# Patient Record
Sex: MALE | Race: WHITE | NOT HISPANIC OR LATINO | Employment: UNEMPLOYED | URBAN - METROPOLITAN AREA
[De-identification: names, ages, dates, MRNs, and addresses within clinical notes are randomized per-mention and may not be internally consistent; named-entity substitution may affect disease eponyms.]

---

## 2019-04-09 ENCOUNTER — APPOINTMENT (EMERGENCY)
Dept: RADIOLOGY | Facility: HOSPITAL | Age: 34
End: 2019-04-09
Payer: MEDICAID

## 2019-04-09 ENCOUNTER — HOSPITAL ENCOUNTER (EMERGENCY)
Facility: HOSPITAL | Age: 34
Discharge: HOME | End: 2019-04-09
Attending: EMERGENCY MEDICINE
Payer: MEDICAID

## 2019-04-09 VITALS
DIASTOLIC BLOOD PRESSURE: 101 MMHG | HEART RATE: 112 BPM | RESPIRATION RATE: 22 BRPM | OXYGEN SATURATION: 98 % | SYSTOLIC BLOOD PRESSURE: 156 MMHG | HEIGHT: 71 IN | WEIGHT: 190 LBS | BODY MASS INDEX: 26.6 KG/M2 | TEMPERATURE: 98.8 F

## 2019-04-09 DIAGNOSIS — L03.115 CELLULITIS OF RIGHT LOWER EXTREMITY: Primary | ICD-10-CM

## 2019-04-09 DIAGNOSIS — M79.604 PAIN OF RIGHT LOWER EXTREMITY: ICD-10-CM

## 2019-04-09 PROCEDURE — 99283 EMERGENCY DEPT VISIT LOW MDM: CPT

## 2019-04-09 PROCEDURE — 73590 X-RAY EXAM OF LOWER LEG: CPT

## 2019-04-09 RX ORDER — TRAMADOL HYDROCHLORIDE 50 MG/1
50 TABLET ORAL ONCE
Status: COMPLETED | OUTPATIENT
Start: 2019-04-09 | End: 2019-04-09

## 2019-04-09 RX ORDER — TRAMADOL HYDROCHLORIDE 50 MG/1
50 TABLET ORAL EVERY 6 HOURS PRN
Qty: 30 TABLET | Refills: 0 | Status: SHIPPED | OUTPATIENT
Start: 2019-04-09 | End: 2019-04-19

## 2019-04-09 RX ORDER — AMOXICILLIN AND CLAVULANATE POTASSIUM 875; 125 MG/1; MG/1
1 TABLET, FILM COATED ORAL ONCE
Status: COMPLETED | OUTPATIENT
Start: 2019-04-09 | End: 2019-04-09

## 2019-04-09 RX ORDER — AMOXICILLIN AND CLAVULANATE POTASSIUM 875; 125 MG/1; MG/1
1 TABLET, FILM COATED ORAL EVERY 12 HOURS SCHEDULED
Qty: 14 TABLET | Refills: 0 | Status: SHIPPED | OUTPATIENT
Start: 2019-04-09 | End: 2019-04-16

## 2019-04-09 RX ADMIN — TRAMADOL HYDROCHLORIDE 50 MG: 50 TABLET, COATED ORAL at 23:44

## 2019-04-09 RX ADMIN — AMOXICILLIN AND CLAVULANATE POTASSIUM 1 TABLET: 875; 125 TABLET, FILM COATED ORAL at 23:44

## 2019-06-03 ENCOUNTER — HOSPITAL ENCOUNTER (EMERGENCY)
Facility: HOSPITAL | Age: 34
Discharge: HOME | End: 2019-06-03
Attending: EMERGENCY MEDICINE | Admitting: EMERGENCY MEDICINE
Payer: COMMERCIAL

## 2019-06-03 VITALS
HEIGHT: 71 IN | DIASTOLIC BLOOD PRESSURE: 86 MMHG | BODY MASS INDEX: 26.7 KG/M2 | WEIGHT: 190.7 LBS | RESPIRATION RATE: 18 BRPM | OXYGEN SATURATION: 98 % | HEART RATE: 79 BPM | SYSTOLIC BLOOD PRESSURE: 155 MMHG | TEMPERATURE: 96.1 F

## 2019-06-03 DIAGNOSIS — S16.1XXA CERVICAL STRAIN: Primary | ICD-10-CM

## 2019-06-03 DIAGNOSIS — M54.2 NECK PAIN: ICD-10-CM

## 2019-06-03 PROCEDURE — 99283 EMERGENCY DEPT VISIT LOW MDM: CPT

## 2019-06-03 RX ORDER — NAPROXEN 250 MG/1
250 TABLET ORAL 2 TIMES DAILY WITH MEALS
Qty: 20 TABLET | Refills: 0 | Status: SHIPPED | OUTPATIENT
Start: 2019-06-03 | End: 2020-09-20 | Stop reason: ALTCHOICE

## 2019-06-03 RX ORDER — CYCLOBENZAPRINE HCL 10 MG
10 TABLET ORAL 2 TIMES DAILY PRN
Qty: 20 TABLET | Refills: 0 | Status: SHIPPED | OUTPATIENT
Start: 2019-06-03 | End: 2020-09-20 | Stop reason: ALTCHOICE

## 2020-09-20 ENCOUNTER — HOSPITAL ENCOUNTER (EMERGENCY)
Facility: HOSPITAL | Age: 35
Discharge: HOME | End: 2020-09-20
Attending: EMERGENCY MEDICINE | Admitting: EMERGENCY MEDICINE
Payer: COMMERCIAL

## 2020-09-20 ENCOUNTER — APPOINTMENT (EMERGENCY)
Dept: CT IMAGING | Facility: HOSPITAL | Age: 35
End: 2020-09-20
Payer: COMMERCIAL

## 2020-09-20 VITALS
TEMPERATURE: 98.2 F | SYSTOLIC BLOOD PRESSURE: 150 MMHG | RESPIRATION RATE: 17 BRPM | DIASTOLIC BLOOD PRESSURE: 89 MMHG | OXYGEN SATURATION: 98 % | HEART RATE: 88 BPM

## 2020-09-20 DIAGNOSIS — K04.7 DENTAL ABSCESS: Primary | ICD-10-CM

## 2020-09-20 DIAGNOSIS — K02.9 DENTAL CARIES: ICD-10-CM

## 2020-09-20 DIAGNOSIS — R22.0 FACIAL SWELLING: ICD-10-CM

## 2020-09-20 PROCEDURE — 99284 EMERGENCY DEPT VISIT MOD MDM: CPT | Performed by: EMERGENCY MEDICINE

## 2020-09-20 PROCEDURE — 96374 THER/PROPH/DIAG INJ IV PUSH: CPT

## 2020-09-20 PROCEDURE — G1004 CDSM NDSC: HCPCS

## 2020-09-20 PROCEDURE — 99284 EMERGENCY DEPT VISIT MOD MDM: CPT

## 2020-09-20 PROCEDURE — 70487 CT MAXILLOFACIAL W/DYE: CPT

## 2020-09-20 RX ORDER — LISINOPRIL 20 MG/1
20 TABLET ORAL DAILY
COMMUNITY

## 2020-09-20 RX ORDER — AMOXICILLIN AND CLAVULANATE POTASSIUM 875; 125 MG/1; MG/1
1 TABLET, FILM COATED ORAL EVERY 12 HOURS
Qty: 14 TABLET | Refills: 0 | Status: SHIPPED | OUTPATIENT
Start: 2020-09-20 | End: 2020-09-27

## 2020-09-20 RX ORDER — CHLORHEXIDINE GLUCONATE 0.12 MG/ML
15 RINSE ORAL 2 TIMES DAILY
Qty: 120 ML | Refills: 0 | Status: SHIPPED | OUTPATIENT
Start: 2020-09-20

## 2020-09-20 RX ORDER — AMOXICILLIN AND CLAVULANATE POTASSIUM 875; 125 MG/1; MG/1
1 TABLET, FILM COATED ORAL ONCE
Status: COMPLETED | OUTPATIENT
Start: 2020-09-20 | End: 2020-09-20

## 2020-09-20 RX ORDER — KETOROLAC TROMETHAMINE 30 MG/ML
15 INJECTION, SOLUTION INTRAMUSCULAR; INTRAVENOUS ONCE
Status: COMPLETED | OUTPATIENT
Start: 2020-09-20 | End: 2020-09-20

## 2020-09-20 RX ADMIN — IOHEXOL 85 ML: 350 INJECTION, SOLUTION INTRAVENOUS at 17:13

## 2020-09-20 RX ADMIN — KETOROLAC TROMETHAMINE 15 MG: 30 INJECTION, SOLUTION INTRAMUSCULAR at 16:46

## 2020-09-20 RX ADMIN — AMOXICILLIN AND CLAVULANATE POTASSIUM 1 TABLET: 875; 125 TABLET, FILM COATED ORAL at 18:47

## 2020-09-20 RX ADMIN — DEXAMETHASONE SODIUM PHOSPHATE 10 MG: 10 INJECTION, SOLUTION INTRAMUSCULAR; INTRAVENOUS at 16:45

## 2020-09-20 NOTE — DISCHARGE INSTRUCTIONS
As we discussed, you have 2 small dental abscesses  I spoke with the on-call [facility] specialist who stated that you should follow-up with their office tomorrow  Call tomorrow and make an appointment for as soon as possible  I have prescribed Augmentin to treat the infection  Take this for 7 days as prescribed  I have also prescribed Peridex mouthwash, which is an antiseptic mouthwash  Use this twice daily as prescribed  You should call [facility] tomorrow and make a follow-up appointment for as soon as possible  Return to the ER with fever, chills, worsening swelling, difficulty opening your mouth, voice changes such as a hoarse voice or difficulty speaking, difficulty swallowing, and difficulty breathing

## 2020-09-20 NOTE — Clinical Note
Deana Ramos was seen and treated in our emergency department on 9/20/2020  No limitations  Diagnosis: Multiple right-sided dental abscesses  Cabrera Sa    He may return on this date: 09/22/2020    Rick Mathur should follow-up with [facility] as soon as possible for treatment of his dental problems  Please excuse him from work if he has an appointment with [facility] or a Dentist      If you have any questions or concerns, please don't hesitate to call        Irena Toney MD    ______________________________           _______________          _______________  Hospital Representative                              Date                                Time

## 2020-09-20 NOTE — ED PROVIDER NOTES
History  Chief Complaint   Patient presents with    Dental Pain     pt presents to ed for right lower dental pain that started last night, no other complaints at this time         40-year-old male with past medical history of hypertension who is presenting with facial swelling  The swelling is located in the right upper cheek  Patient states that this began yesterday  He has a history of poor dentition with numerous dental caries and missing teeth  He states that he last saw a dentist about 1 year ago  He denies any acute injuries to his face  No fevers or chills  No difficulty opening the mouth, speaking, or swallowing  No neck pain or neck stiffness  No other complaints on review of systems  Prior to Admission Medications   Prescriptions Last Dose Informant Patient Reported? Taking?   lisinopril (ZESTRIL) 20 mg tablet   Yes Yes   Sig: Take 20 mg by mouth daily      Facility-Administered Medications: None       Past Medical History:   Diagnosis Date    Hypertension        Past Surgical History:   Procedure Laterality Date    SHOULDER SURGERY Left        Family History   Problem Relation Age of Onset    Hypertension Mother     Hypertension Father      I have reviewed and agree with the history as documented  E-Cigarette/Vaping     E-Cigarette/Vaping Substances     Social History     Tobacco Use    Smoking status: Current Every Day Smoker     Packs/day: 0 50     Types: Cigarettes    Smokeless tobacco: Never Used   Substance Use Topics    Alcohol use: Yes     Comment: occ   Drug use: Not Currently       Review of Systems   Constitutional: Negative for diaphoresis, fever and unexpected weight change  HENT: Positive for dental problem and facial swelling  Negative for congestion, rhinorrhea and sore throat  Eyes: Negative for pain, discharge and visual disturbance  Respiratory: Negative for cough, shortness of breath and wheezing      Cardiovascular: Negative for chest pain, palpitations and leg swelling  Gastrointestinal: Negative for abdominal pain, blood in stool, constipation, diarrhea, nausea and vomiting  Genitourinary: Negative for dysuria, flank pain and hematuria  Musculoskeletal: Negative for arthralgias and myalgias  Skin: Negative for rash and wound  Allergic/Immunologic: Negative for environmental allergies and food allergies  Neurological: Negative for dizziness, seizures, weakness and numbness  Hematological: Negative for adenopathy  Psychiatric/Behavioral: Negative for confusion and hallucinations  Physical Exam  Physical Exam  Vitals signs and nursing note reviewed  Constitutional:       Appearance: He is well-developed  He is not diaphoretic  HENT:      Head: Normocephalic and atraumatic  Comments: Significant facial swelling affecting right upper cheek  See below photograph  Very poor dentition, with numerous dental caries and missing teeth  No periapical abscess seen  Gingivitis seen  No trismus  No submandibular or submental swelling or induration  Right Ear: External ear normal       Left Ear: External ear normal       Nose: Nose normal    Eyes:      Pupils: Pupils are equal, round, and reactive to light  Neck:      Comments: No rigidity  Normal range of motion  Cardiovascular:      Rate and Rhythm: Normal rate and regular rhythm  Pulmonary:      Effort: Pulmonary effort is normal  No respiratory distress  Musculoskeletal: Normal range of motion  General: No deformity  Skin:     General: Skin is warm and dry  Capillary Refill: Capillary refill takes less than 2 seconds  Neurological:      Mental Status: He is alert and oriented to person, place, and time  Comments: No gross motor deficits noted  Cranial nerves II-XII are intact  Speech is fluent without dysarthria or aphasia     Psychiatric:         Mood and Affect: Mood normal          Behavior: Behavior normal                Vital Signs  ED Triage Vitals [09/20/20 1610]   Temperature Pulse Respirations Blood Pressure SpO2   98 2 °F (36 8 °C) 92 17 (!) 155/109 99 %      Temp Source Heart Rate Source Patient Position - Orthostatic VS BP Location FiO2 (%)   Tympanic Monitor -- Right arm --      Pain Score       No Pain           Vitals:    09/20/20 1610 09/20/20 1710   BP: (!) 155/109 149/93   Pulse: 92 89         Visual Acuity      ED Medications  Medications   amoxicillin-clavulanate (AUGMENTIN) 875-125 mg per tablet 1 tablet (has no administration in time range)   ketorolac (TORADOL) injection 15 mg (15 mg Intravenous Given 9/20/20 1646)   dexamethasone 10 mg/mL oral liquid 10 mg 1 mL (10 mg Oral Given 9/20/20 1645)   iohexol (OMNIPAQUE) 350 MG/ML injection (MULTI-DOSE) 85 mL (85 mL Intravenous Given 9/20/20 1713)       Diagnostic Studies  Results Reviewed     None                 CT facial bones with contrast   Final Result by Sarita Andrew MD (09/20 1758)         1  Extensive right facial cellulitis  Subperiosteal abscess overlying the right maxilla measuring 1 8 x 0 6 x 1 8 cm  Right mandibular body subperiosteal 1 4 x 0 6 x 1 2 cm abscess  2   Periapical abscesses involving the right maxillary incisor and right mandibular 1st premolar, both with buccal cortical destruction suggesting odontogenic sources of infection  3   Chronic right maxillary paranasal sinus disease, possibly also secondary to odontogenic source of infection  Workstation performed: [id number]                    Procedures  Procedures         ED Course  ED Course as of Sep 20 1840   Sun Sep 20, 2020   1700 BMP was hemolyzed  Given that the patient is 38years old without significant comorbidities and no reason for chronic kidney disease, no need to wait for repeat BMP as this would only delay care further  Spoke with CT tech who will take the patient for requested study  1755 Patient sleeping comfortably  Informed him that we were waiting for CT results  He had complete relief of pain with Toradol  7616 Message sent to Dr Regina Carvajal, to discuss CT findings and whether patient would be stable for outpatient follow up  1815 Patient is stable for outpatient follow-up  Will give antibiotics here and have him call [facility] tomorrow  SBIRT 20yo+      Most Recent Value   SBIRT (24 yo +)   In order to provide better care to our patients, we are screening all of our patients for alcohol and drug use  Would it be okay to ask you these screening questions? No Filed at: 09/20/2020 1612   Initial Alcohol Screen: US AUDIT-C    1  How often do you have a drink containing alcohol? 1 Filed at: 09/20/2020 1612   2  How many drinks containing alcohol do you have on a typical day you are drinking? 1 Filed at: 09/20/2020 1612   3a  Male UNDER 65: How often do you have five or more drinks on one occasion? 1 Filed at: 09/20/2020 1612   3b  FEMALE Any Age, or MALE 65+: How often do you have 4 or more drinks on one occassion? 1 Filed at: 09/20/2020 1612   Audit-C Score  4 Filed at: 09/20/2020 1612   OWEN: How many times in the past year have you    Used an illegal drug or used a prescription medication for non-medical reasons? Never Filed at: 09/20/2020 1612                  MDM  Number of Diagnoses or Management Options  Dental abscess: new and requires workup  Dental caries: established and worsening  Facial swelling: new and requires workup  Diagnosis management comments:     Patient presented with facial swelling as detailed above  He was noted have extremely poor dentition  On examination, he had multiple dental caries and missing teeth  He had right-sided facial swelling but no findings to suggest Benoit's angina or deep space neck infection  He was treated with Toradol which completely relieved his pain  CT was obtained and demonstrated subperiosteal abscesses overlying the right maxilla and right mandibular body    Case was discussed with on-call [facility] specialist to agreed that the patient could be treated with antibiotics and referred for prompt outpatient follow-up  I advised the patient to call the office 1st thing tomorrow to obtain an appointment as soon as possible  I prescribed Augmentin and Peridex to treat the infection  Provided strict return precautions  The patient verbalized understanding  Amount and/or Complexity of Data Reviewed  Tests in the radiology section of CPT®: ordered and reviewed  Decide to obtain previous medical records or to obtain history from someone other than the patient: yes  Review and summarize past medical records: yes  Discuss the patient with other providers: yes  Independent visualization of images, tracings, or specimens: yes    Risk of Complications, Morbidity, and/or Mortality  Presenting problems: moderate  Diagnostic procedures: minimal  Management options: minimal    Patient Progress  Patient progress: improved      Disposition  Final diagnoses:   Dental abscess   Facial swelling   Dental caries     Time reflects when diagnosis was documented in both MDM as applicable and the Disposition within this note     Time User Action Codes Description Comment    9/20/2020  6:33 PM Marnie Saint Add [K04 7] Dental abscess     9/20/2020  6:33 PM Marnie Saint Add [R22 0] Facial swelling     9/20/2020  6:33 PM Marnie Saint Add [K02 9] Dental caries       ED Disposition     ED Disposition Condition Date/Time Comment    Discharge Good Sun Sep 20, 2020  6:33 PM Kellee Panda  discharge to home/self care  Follow-up Information     Follow up With Specialties Details Why Contact Info Additional [address] for Oral and [address]  Call in 1 day Please call tomorrow and make a follow-up appointment for as soon as possible   [facility] [address] [facility] Department Emergency Medicine Go to  If symptoms worsen  [address] [address] [phone]  ED, [address] [address]          Patient's Medications   Discharge Prescriptions    AMOXICILLIN-CLAVULANATE (AUGMENTIN) 875-125 MG PER TABLET    Take 1 tablet by mouth every 12 (twelve) hours for 7 days       Start Date: 9/20/2020 End Date: 9/27/2020       Order Dose: 1 tablet       Quantity: 14 tablet    Refills: 0    CHLORHEXIDINE (PERIDEX) 0 12 % SOLUTION    Apply 15 mL to the mouth or throat 2 (two) times a day Swish and spit twice daily  Start Date: 9/20/2020 End Date: --       Order Dose: 15 mL       Quantity: 120 mL    Refills: 0     No discharge procedures on file      PDMP Review     None          ED Provider  Electronically Signed by           Clover Herr MD  09/20/20 0579

## 2022-09-18 ENCOUNTER — HOSPITAL ENCOUNTER (EMERGENCY)
Facility: HOSPITAL | Age: 37
Discharge: HOME | End: 2022-09-18
Attending: EMERGENCY MEDICINE
Payer: COMMERCIAL

## 2022-09-18 ENCOUNTER — APPOINTMENT (EMERGENCY)
Dept: CT IMAGING | Facility: HOSPITAL | Age: 37
End: 2022-09-18
Payer: COMMERCIAL

## 2022-09-18 VITALS
HEART RATE: 77 BPM | SYSTOLIC BLOOD PRESSURE: 142 MMHG | RESPIRATION RATE: 16 BRPM | TEMPERATURE: 97.8 F | OXYGEN SATURATION: 97 % | DIASTOLIC BLOOD PRESSURE: 80 MMHG

## 2022-09-18 DIAGNOSIS — R40.20 LOSS OF CONSCIOUSNESS (HCC): ICD-10-CM

## 2022-09-18 DIAGNOSIS — R90.89 ABNORMAL CT OF BRAIN: Primary | ICD-10-CM

## 2022-09-18 DIAGNOSIS — E87.6 HYPOKALEMIA: ICD-10-CM

## 2022-09-18 LAB
ALBUMIN SERPL BCP-MCNC: 4.9 G/DL (ref 3.5–5)
ALP SERPL-CCNC: 53 U/L (ref 34–104)
ALT SERPL W P-5'-P-CCNC: 19 U/L (ref 7–52)
ANION GAP SERPL CALCULATED.3IONS-SCNC: 20 MMOL/L (ref 4–13)
AST SERPL W P-5'-P-CCNC: 21 U/L (ref 13–39)
BASOPHILS # BLD AUTO: 0.06 THOUSANDS/ΜL (ref 0–0.1)
BASOPHILS NFR BLD AUTO: 1 % (ref 0–1)
BILIRUB SERPL-MCNC: 0.83 MG/DL (ref 0.2–1)
BUN SERPL-MCNC: 14 MG/DL (ref 5–25)
CALCIUM SERPL-MCNC: 9.8 MG/DL (ref 8.4–10.2)
CHLORIDE SERPL-SCNC: 100 MMOL/L (ref 96–108)
CO2 SERPL-SCNC: 18 MMOL/L (ref 21–32)
CREAT SERPL-MCNC: 0.95 MG/DL (ref 0.6–1.3)
EOSINOPHIL # BLD AUTO: 0.22 THOUSAND/ΜL (ref 0–0.61)
EOSINOPHIL NFR BLD AUTO: 3 % (ref 0–6)
ERYTHROCYTE [DISTWIDTH] IN BLOOD BY AUTOMATED COUNT: 13.2 % (ref 11.6–15.1)
GFR SERPL CREATININE-BSD FRML MDRD: 100 ML/MIN/1.73SQ M
GLUCOSE SERPL-MCNC: 104 MG/DL (ref 65–140)
GLUCOSE SERPL-MCNC: 90 MG/DL (ref 65–140)
HCT VFR BLD AUTO: 42.4 % (ref 36.5–49.3)
HGB BLD-MCNC: 14.7 G/DL (ref 12–17)
IMM GRANULOCYTES # BLD AUTO: 0.03 THOUSAND/UL (ref 0–0.2)
IMM GRANULOCYTES NFR BLD AUTO: 0 % (ref 0–2)
LYMPHOCYTES # BLD AUTO: 3.12 THOUSANDS/ΜL (ref 0.6–4.47)
LYMPHOCYTES NFR BLD AUTO: 41 % (ref 14–44)
MCH RBC QN AUTO: 30.5 PG (ref 26.8–34.3)
MCHC RBC AUTO-ENTMCNC: 34.7 G/DL (ref 31.4–37.4)
MCV RBC AUTO: 88 FL (ref 82–98)
MONOCYTES # BLD AUTO: 0.68 THOUSAND/ΜL (ref 0.17–1.22)
MONOCYTES NFR BLD AUTO: 9 % (ref 4–12)
NEUTROPHILS # BLD AUTO: 3.59 THOUSANDS/ΜL (ref 1.85–7.62)
NEUTS SEG NFR BLD AUTO: 46 % (ref 43–75)
NRBC BLD AUTO-RTO: 0 /100 WBCS
PLATELET # BLD AUTO: 331 THOUSANDS/UL (ref 149–390)
PMV BLD AUTO: 9.6 FL (ref 8.9–12.7)
POTASSIUM SERPL-SCNC: 3 MMOL/L (ref 3.5–5.3)
PROT SERPL-MCNC: 8.3 G/DL (ref 6.4–8.4)
RBC # BLD AUTO: 4.82 MILLION/UL (ref 3.88–5.62)
SODIUM SERPL-SCNC: 138 MMOL/L (ref 135–147)
WBC # BLD AUTO: 7.7 THOUSAND/UL (ref 4.31–10.16)

## 2022-09-18 PROCEDURE — 96365 THER/PROPH/DIAG IV INF INIT: CPT

## 2022-09-18 PROCEDURE — 99285 EMERGENCY DEPT VISIT HI MDM: CPT | Performed by: EMERGENCY MEDICINE

## 2022-09-18 PROCEDURE — 99285 EMERGENCY DEPT VISIT HI MDM: CPT

## 2022-09-18 PROCEDURE — 96367 TX/PROPH/DG ADDL SEQ IV INF: CPT

## 2022-09-18 PROCEDURE — 82948 REAGENT STRIP/BLOOD GLUCOSE: CPT

## 2022-09-18 PROCEDURE — 70450 CT HEAD/BRAIN W/O DYE: CPT

## 2022-09-18 PROCEDURE — 36415 COLL VENOUS BLD VENIPUNCTURE: CPT

## 2022-09-18 PROCEDURE — 93005 ELECTROCARDIOGRAM TRACING: CPT

## 2022-09-18 PROCEDURE — G1004 CDSM NDSC: HCPCS

## 2022-09-18 PROCEDURE — 80053 COMPREHEN METABOLIC PANEL: CPT

## 2022-09-18 PROCEDURE — 85025 COMPLETE CBC W/AUTO DIFF WBC: CPT

## 2022-09-18 PROCEDURE — 96366 THER/PROPH/DIAG IV INF ADDON: CPT

## 2022-09-18 RX ORDER — ACETAMINOPHEN 325 MG/1
650 TABLET ORAL ONCE
Status: COMPLETED | OUTPATIENT
Start: 2022-09-18 | End: 2022-09-18

## 2022-09-18 RX ORDER — POTASSIUM CHLORIDE 20 MEQ/1
40 TABLET, EXTENDED RELEASE ORAL ONCE
Status: COMPLETED | OUTPATIENT
Start: 2022-09-18 | End: 2022-09-18

## 2022-09-18 RX ORDER — MAGNESIUM SULFATE 1 G/100ML
1 INJECTION INTRAVENOUS ONCE
Status: COMPLETED | OUTPATIENT
Start: 2022-09-18 | End: 2022-09-18

## 2022-09-18 RX ORDER — POTASSIUM CHLORIDE 14.9 MG/ML
20 INJECTION INTRAVENOUS ONCE
Status: COMPLETED | OUTPATIENT
Start: 2022-09-18 | End: 2022-09-18

## 2022-09-18 RX ADMIN — POTASSIUM CHLORIDE 40 MEQ: 1500 TABLET, EXTENDED RELEASE ORAL at 16:32

## 2022-09-18 RX ADMIN — SODIUM CHLORIDE 1000 ML: 0.9 INJECTION, SOLUTION INTRAVENOUS at 17:11

## 2022-09-18 RX ADMIN — MAGNESIUM SULFATE HEPTAHYDRATE 1 G: 1 INJECTION, SOLUTION INTRAVENOUS at 16:21

## 2022-09-18 RX ADMIN — SODIUM CHLORIDE 1000 ML: 0.9 INJECTION, SOLUTION INTRAVENOUS at 15:59

## 2022-09-18 RX ADMIN — ACETAMINOPHEN 650 MG: 325 TABLET, FILM COATED ORAL at 15:57

## 2022-09-18 RX ADMIN — POTASSIUM CHLORIDE 20 MEQ: 14.9 INJECTION, SOLUTION INTRAVENOUS at 17:11

## 2022-09-18 NOTE — ED CARE HANDOFF
Emergency Department Sign Out Note        Sign out and transfer of care from [facility]  See Separate Emergency Department note  The patient, Katie Harrison , was evaluated by the previous provider for loss of consciousness  Workup Completed:  Labs completed  CT head pending  ED Course / Workup Pending (followup): I spoke with patient who is feeling much better  He notes loss of consciousness, possible syncope versus seizure  EKG shows borderline prolonged QTC  Labs reveal hypokalemia  Will give magnesium and potassium replacement  Awaiting CT head  Patient is anticipating discharge after completion of electrolyte replacement  Electrolyte replacement completed  Patient continues to feel well and is requesting discharge  EKG shows improvement in QTc  Patient given neurology follow up, although syncope felt to be more likely  Portions of the above record have been created with voice recognition software  Occasional wrong word or "sound alike" substitutions may have occurred due to the inherent limitations of voice recognition software  Read the chart carefully and recognize, using context, where substitutions may have occurred  ED Course as of 09/18/22 1932   Chuy Veras Sep 18, 2022   9292 Patient feels well  He is eating and drinking  His electrolyte replacement has finished  He is comfortable with discharge  Repeat EKG shows a normal QTC  ECG 12 Lead Documentation Only    Date/Time: 9/18/2022 7:32 PM  Performed by: Low Banegas DO  Authorized by: Low Banegas DO     ECG reviewed by me, the ED Provider: yes    Patient location:  ED  Previous ECG:     Previous ECG:  Compared to current    Similarity:  Changes noted    Comparison to cardiac monitor: Yes    Comments:      Normal sinus rhythm at a rate of 72 beats per minute  Normal intervals  Normal axis  Normal QRS  No ST T wave abnormalities    QTC improved from previous EKG       MDM        Disposition  Final diagnoses:   None     ED Disposition     None      Follow-up Information    None       Patient's Medications   Discharge Prescriptions    No medications on file     No discharge procedures on file         ED Provider  Electronically Signed by     German Donohue DO  09/20/22 4367

## 2022-09-18 NOTE — ED PROVIDER NOTES
History  Chief Complaint   Patient presents with    Seizure - Prior Hx Of     Pt arrives via ems from home after neighbor witnessed seizure like activity, pt told ems no hx of but pt told this RN hx of seizures when he was younger     The patient is a 57-year-old male with a past medical history of hypertension who presents to the emergency department with complaint of new onset seizure  The patient states that he was mowing the yard and the next thing he knew he was lying on the ground in the ambulance had arrived to evaluate him  A neighbor stated that they saw him have what appeared to be a seizure  He states that he had a history of seizures when he was 9years old but has not had an issue since then  He does not remember any of the events before the ambulance arrived  The patient does admit to smoking THC early this morning and smoking methamphetamine approximately 1 day ago  He currently endorses a headache that he describes as throbbing and located in the top of his head that is nonradiating  He denies change in vision, lightheadedness, neck pain, chest pain, shortness of breath, abdominal pain, nausea, vomiting, dysuria, hematuria, numbness, tingling, rash or fever  Prior to Admission Medications   Prescriptions Last Dose Informant Patient Reported? Taking?   chlorhexidine (PERIDEX) 0 12 % solution   No No   Sig: Apply 15 mL to the mouth or throat 2 (two) times a day Swish and spit twice daily  lisinopril (ZESTRIL) 20 mg tablet   Yes No   Sig: Take 20 mg by mouth daily      Facility-Administered Medications: None       Past Medical History:   Diagnosis Date    Hypertension        Past Surgical History:   Procedure Laterality Date    SHOULDER SURGERY Left        Family History   Problem Relation Age of Onset    Hypertension Mother     Hypertension Father      I have reviewed and agree with the history as documented      E-Cigarette/Vaping     E-Cigarette/Vaping Substances     Social History     Tobacco Use    Smoking status: Current Every Day Smoker     Packs/day: 0 50     Types: Cigarettes    Smokeless tobacco: Never Used   Substance Use Topics    Alcohol use: Yes     Comment: occ   Drug use: Not Currently        Review of Systems   Constitutional: Negative for chills and fever  HENT: Negative for congestion, ear pain and sore throat  Eyes: Negative for pain and visual disturbance  Respiratory: Negative for cough and shortness of breath  Cardiovascular: Negative for chest pain, palpitations and leg swelling  Gastrointestinal: Negative for abdominal distention, abdominal pain, diarrhea, nausea and vomiting  Endocrine: Negative  Genitourinary: Negative for dysuria and hematuria  Musculoskeletal: Negative for arthralgias, back pain, neck pain and neck stiffness  Skin: Negative for color change and rash  Allergic/Immunologic: Negative  Neurological: Positive for seizures and headaches  Negative for dizziness, tremors, syncope, facial asymmetry, speech difficulty, weakness, light-headedness and numbness  Hematological: Negative  Psychiatric/Behavioral: Negative  All other systems reviewed and are negative  Physical Exam  ED Triage Vitals   Temperature Pulse Respirations Blood Pressure SpO2   09/18/22 1535 09/18/22 1535 09/18/22 1535 09/18/22 1535 09/18/22 1535   97 8 °F (36 6 °C) 101 16 143/68 92 %      Temp Source Heart Rate Source Patient Position - Orthostatic VS BP Location FiO2 (%)   09/18/22 1535 09/18/22 1535 09/18/22 1535 09/18/22 1535 --   Oral Monitor Sitting Right arm       Pain Score       09/18/22 1557       5             Orthostatic Vital Signs  Vitals:    09/18/22 1535 09/18/22 1642   BP: 143/68 143/68   Pulse: 101 77   Patient Position - Orthostatic VS: Sitting Lying       Physical Exam  Vitals and nursing note reviewed  Constitutional:       Appearance: Normal appearance  He is well-developed  He is ill-appearing     HENT:      Head: Normocephalic  Comments: There is an abrasion to the left eyebrow  Nose: Nose normal       Mouth/Throat:      Mouth: Mucous membranes are moist       Pharynx: Oropharynx is clear  Eyes:      Extraocular Movements: Extraocular movements intact  Conjunctiva/sclera: Conjunctivae normal       Pupils: Pupils are equal, round, and reactive to light  Cardiovascular:      Rate and Rhythm: Regular rhythm  Tachycardia present  Pulses: Normal pulses  Heart sounds: Normal heart sounds  No murmur heard  No friction rub  Pulmonary:      Effort: Pulmonary effort is normal  No respiratory distress  Breath sounds: Normal breath sounds  No stridor  No wheezing, rhonchi or rales  Abdominal:      General: Abdomen is flat  There is no distension  Palpations: Abdomen is soft  Tenderness: There is no abdominal tenderness  There is no guarding or rebound  Musculoskeletal:         General: Signs of injury present  No swelling or tenderness  Normal range of motion  Cervical back: Normal range of motion and neck supple  No rigidity or tenderness  Right lower leg: No edema  Left lower leg: No edema  Comments: Abrasion to the left elbow  Lymphadenopathy:      Cervical: No cervical adenopathy  Skin:     General: Skin is warm and dry  Capillary Refill: Capillary refill takes less than 2 seconds  Coloration: Skin is not jaundiced  Findings: No erythema or rash  Neurological:      General: No focal deficit present  Mental Status: He is alert  He is disoriented  Cranial Nerves: No cranial nerve deficit  Sensory: No sensory deficit  Motor: No weakness  Comments: The patient was alert and oriented to person and time but not to place or event           ED Medications  Medications   sodium chloride 0 9 % bolus 1,000 mL (1,000 mL Intravenous New Bag 9/18/22 0126)   magnesium sulfate IVPB (premix) SOLN 1 g (1 g Intravenous New Bag 9/18/22 1621)   potassium chloride 20 mEq IVPB (premix) (has no administration in time range)   sodium chloride 0 9 % bolus 1,000 mL (has no administration in time range)   acetaminophen (TYLENOL) tablet 650 mg (650 mg Oral Given 9/18/22 1557)   potassium chloride (K-DUR,KLOR-CON) CR tablet 40 mEq (40 mEq Oral Given 9/18/22 1632)       Diagnostic Studies  Results Reviewed     Procedure Component Value Units Date/Time    Comprehensive metabolic panel [[id number]]  (Abnormal) Collected: 09/18/22 1554    Lab Status: Final result Specimen: Blood from Arm, Left Updated: 09/18/22 1619     Sodium 138 mmol/L      Potassium 3 0 mmol/L      Chloride 100 mmol/L      CO2 18 mmol/L      ANION GAP 20 mmol/L      BUN 14 mg/dL      Creatinine 0 95 mg/dL      Glucose 90 mg/dL      Calcium 9 8 mg/dL      AST 21 U/L      ALT 19 U/L      Alkaline Phosphatase 53 U/L      Total Protein 8 3 g/dL      Albumin 4 9 g/dL      Total Bilirubin 0 83 mg/dL      eGFR 100 ml/min/1 73sq m     Narrative:      [place] guidelines for Chronic Kidney Disease (CKD):     Stage 1 with normal or high GFR (GFR > 90 mL/min/1 73 square meters)    Stage 2 Mild CKD (GFR = 60-89 mL/min/1 73 square meters)    Stage 3A Moderate CKD (GFR = 45-59 mL/min/1 73 square meters)    Stage 3B Moderate CKD (GFR = 30-44 mL/min/1 73 square meters)    Stage 4 Severe CKD (GFR = 15-29 mL/min/1 73 square meters)    Stage 5 End Stage CKD (GFR <15 mL/min/1 73 square meters)  Note: GFR calculation is accurate only with a steady state creatinine    CBC and differential [[id number]] Collected: 09/18/22 1554    Lab Status: Final result Specimen: Blood from Arm, Left Updated: 09/18/22 1602     WBC 7 70 Thousand/uL      RBC 4 82 Million/uL      Hemoglobin 14 7 g/dL      Hematocrit 42 4 %      MCV 88 fL      MCH 30 5 pg      MCHC 34 7 g/dL      RDW 13 2 %      MPV 9 6 fL      Platelets 610 Thousands/uL      nRBC 0 /100 WBCs      Neutrophils Relative 46 %      Immat GRANS % 0 %      Lymphocytes Relative 41 %      Monocytes Relative 9 %      Eosinophils Relative 3 %      Basophils Relative 1 %      Neutrophils Absolute 3 59 Thousands/µL      Immature Grans Absolute 0 03 Thousand/uL      Lymphocytes Absolute 3 12 Thousands/µL      Monocytes Absolute 0 68 Thousand/µL      Eosinophils Absolute 0 22 Thousand/µL      Basophils Absolute 0 06 Thousands/µL     Fingerstick Glucose (POCT) [[id number]]  (Normal) Collected: 09/18/22 1548    Lab Status: Final result Updated: 09/18/22 1549     POC Glucose 104 mg/dl                  CT head without contrast    (Results Pending)         Procedures  ECG 12 Lead Documentation Only    Date/Time: 9/18/2022 3:47 PM  Performed by: Tabatha Wright DO  Authorized by: Tabatha Wright DO     Indications / Diagnosis:  Seizure  ECG reviewed by me, the ED Provider: yes    Patient location:  ED  Previous ECG:     Previous ECG:  Unavailable    Comparison to cardiac monitor: No    Interpretation:     Interpretation: abnormal    Rate:     ECG rate:  96    ECG rate assessment: normal    Rhythm:     Rhythm: sinus rhythm    Ectopy:     Ectopy: none    QRS:     QRS axis:  Normal    QRS intervals:  Normal  Conduction:     Conduction: normal    ST segments:     ST segments:  Normal  T waves:     T waves: normal    Comments:      Prolonged QT was normal sinus rhythm and possible left atrial enlargement  Patient denies chest pain or shortness of breath at this time  ED Course  ED Course as of 09/18/22 1649   Sun Sep 18, 2022   1610 ECG 12 lead  Normal sinus rhythm with possible left atrial enlargement and prolonged QT  I have ordered 1 g of Mag IV at this time  1623 Potassium(!): 3 0  I have ordered 20 mEq of IV potassium and 40 mEq of p o  potassium at this time  [address] was signed over to Dr Boo Gastelum  We have a clinical suspicion of syncope secondary to dehydration    Pending no concerning findings on his head CT he will be clear for discharge from our standpoint after having his potassium repleted  SBIRT 22yo+    Flowsheet Row Most Recent Value   SBIRT (23 yo +)    In order to provide better care to our patients, we are screening all of our patients for alcohol and drug use  Would it be okay to ask you these screening questions? Unable to answer at this time Filed at: 09/18/2022 1537                MDM  Number of Diagnoses or Management Options  Diagnosis management comments: The patient is a 77-year-old male with a past medical history of hypertension who presents to the emergency department with complaint of new onset seizure  Upon initial presentation the patient was alert but mildly confused  He was aware of his name, date of birth and that he was at hospital but could not say which hospital or give his address  The patient's shirt was wet  His fingerstick glucose was 104  There was an abrasion to the left eyebrow and left elbow  The remainder of his physical exam was grossly unremarkable  I have ordered a CBC, CMP, EKG and CT without contrast of the head  Labs and imaging pending  I have given 1 L of normal saline as well as Tylenol and 1 g of magnesium  Disposition  Final diagnoses:   None     ED Disposition     None      Follow-up Information    None         Patient's Medications   Discharge Prescriptions    No medications on file     No discharge procedures on file  PDMP Review     None           ED Provider  Attending physically available and evaluated Ozzie Maher    I managed the patient along with the ED Attending      Electronically Signed by         Rene Kirkland DO  09/18/22 9396

## 2022-09-21 LAB
ATRIAL RATE: 72 BPM
ATRIAL RATE: 96 BPM
P AXIS: 26 DEGREES
P AXIS: 82 DEGREES
PR INTERVAL: 132 MS
PR INTERVAL: 140 MS
QRS AXIS: 26 DEGREES
QRS AXIS: 89 DEGREES
QRSD INTERVAL: 100 MS
QRSD INTERVAL: 98 MS
QT INTERVAL: 396 MS
QT INTERVAL: 420 MS
QTC INTERVAL: 459 MS
QTC INTERVAL: 500 MS
T WAVE AXIS: 27 DEGREES
T WAVE AXIS: 44 DEGREES
VENTRICULAR RATE: 72 BPM
VENTRICULAR RATE: 96 BPM

## 2022-09-21 PROCEDURE — 93010 ELECTROCARDIOGRAM REPORT: CPT | Performed by: INTERNAL MEDICINE

## 2022-09-21 NOTE — ED ATTENDING ATTESTATION
9/18/2022  Otoniel Krishnamurthy DO, saw and evaluated the patient  I have discussed the patient with the resident/non-physician practitioner and agree with the resident's/non-physician practitioner's findings, Plan of Care, and MDM as documented in the resident's/non-physician practitioner's note, except where noted  All available labs and Radiology studies were reviewed  I was present for key portions of any procedure(s) performed by the resident/non-physician practitioner and I was immediately available to provide assistance  At this point I agree with the current assessment done in the Emergency Department  I have conducted an independent evaluation of this patient a history and physical is as follows:    ED Course     Patient seen examined  Patient is a well-appearing 64-year-old male who had syncopal event while mowing the lawn  Patient states he did not drink any fluids today  Labs overall reassuring  Patient signed out to Dr Delbert Peabody to f/u rest of labs, CT head and disposition the patient       Critical Care Time  Procedures

## 2024-03-10 PROBLEM — F17.210 CIGARETTE NICOTINE DEPENDENCE WITHOUT COMPLICATION: Status: ACTIVE | Noted: 2024-03-10

## 2024-03-10 PROBLEM — Z00.00 ENCOUNTER FOR ANNUAL PHYSICAL EXAM: Status: ACTIVE | Noted: 2024-03-10

## 2024-04-11 ENCOUNTER — HOSPITAL ENCOUNTER (EMERGENCY)
Facility: HOSPITAL | Age: 39
Discharge: HOME | End: 2024-04-11
Attending: EMERGENCY MEDICINE

## 2024-04-11 ENCOUNTER — APPOINTMENT (EMERGENCY)
Dept: RADIOLOGY | Facility: HOSPITAL | Age: 39
End: 2024-04-11

## 2024-04-11 VITALS
RESPIRATION RATE: 20 BRPM | BODY MASS INDEX: 26.6 KG/M2 | TEMPERATURE: 97.8 F | SYSTOLIC BLOOD PRESSURE: 148 MMHG | HEIGHT: 71 IN | DIASTOLIC BLOOD PRESSURE: 90 MMHG | HEART RATE: 73 BPM | WEIGHT: 190 LBS | OXYGEN SATURATION: 100 %

## 2024-04-11 DIAGNOSIS — M43.6 TORTICOLLIS: Primary | ICD-10-CM

## 2024-04-11 PROCEDURE — 73030 X-RAY EXAM OF SHOULDER: CPT

## 2024-04-11 PROCEDURE — 99284 EMERGENCY DEPT VISIT MOD MDM: CPT | Performed by: EMERGENCY MEDICINE

## 2024-04-11 PROCEDURE — 99283 EMERGENCY DEPT VISIT LOW MDM: CPT

## 2024-04-11 PROCEDURE — 96372 THER/PROPH/DIAG INJ SC/IM: CPT

## 2024-04-11 RX ORDER — METHOCARBAMOL 500 MG/1
500 TABLET, FILM COATED ORAL 2 TIMES DAILY
Qty: 20 TABLET | Refills: 0 | Status: SHIPPED | OUTPATIENT
Start: 2024-04-11 | End: 2024-04-11

## 2024-04-11 RX ORDER — IBUPROFEN 600 MG/1
600 TABLET ORAL EVERY 6 HOURS PRN
Qty: 30 TABLET | Refills: 0 | Status: SHIPPED | OUTPATIENT
Start: 2024-04-11

## 2024-04-11 RX ORDER — METHOCARBAMOL 500 MG/1
500 TABLET, FILM COATED ORAL 2 TIMES DAILY
Qty: 20 TABLET | Refills: 0 | Status: SHIPPED | OUTPATIENT
Start: 2024-04-11

## 2024-04-11 RX ORDER — IBUPROFEN 600 MG/1
600 TABLET ORAL EVERY 6 HOURS PRN
Qty: 30 TABLET | Refills: 0 | Status: SHIPPED | OUTPATIENT
Start: 2024-04-11 | End: 2024-04-11

## 2024-04-11 RX ORDER — KETOROLAC TROMETHAMINE 30 MG/ML
30 INJECTION, SOLUTION INTRAMUSCULAR; INTRAVENOUS ONCE
Status: COMPLETED | OUTPATIENT
Start: 2024-04-11 | End: 2024-04-11

## 2024-04-11 RX ADMIN — KETOROLAC TROMETHAMINE 30 MG: 30 INJECTION, SOLUTION INTRAMUSCULAR at 09:02

## 2024-04-11 NOTE — Clinical Note
Juanpablo Amaya was seen and treated in our emergency department on 4/11/2024.    No restrictions    ?    ?    Diagnosis: Torticollis    Juanpablo  may return to work on return date.    He may return on this date: 04/13/2024    ?     If you have any questions or concerns, please don't hesitate to call.      Elias Vargas MD    ______________________________           _______________          _______________  Hospital Representative                              Date                                Time

## 2024-04-11 NOTE — ED PROVIDER NOTES
"History  Chief Complaint   Patient presents with    Arm Pain     R arm/shoulder/neck pain, x3week, some numbness in proximal R UE \"that has been there the whole time\" otherwise neurovasc intact, denies injury, unable to identify cause \"I think maybe I slept wrong\". Taking otc rx and using heat pack w.o effect, last tyl 0630     38-year-old male presents to the emergency department for evaluation of right-sided shoulder and neck pain.  Patient reports that he woke up from sleep approximately 3 weeks ago with the symptoms.  Denies any recent falls or direct trauma to the area.  Reports that he feels like his head is constantly tilted to the right side now. States that he has been taking Tylenol and Motrin intermittently with only minimal relief.  States that he went to work today and felt like the pain was worse so came to the emergency department for further evaluation.  He denies fevers, chills, nausea, vomiting, diarrhea and sick contacts.        Prior to Admission Medications   Prescriptions Last Dose Informant Patient Reported? Taking?   chlorhexidine (PERIDEX) 0.12 % solution   No No   Sig: Apply 15 mL to the mouth or throat 2 (two) times a day Swish and spit twice daily.   lisinopril (ZESTRIL) 20 mg tablet   Yes No   Sig: Take 20 mg by mouth daily      Facility-Administered Medications: None       Past Medical History:   Diagnosis Date    Hypertension        Past Surgical History:   Procedure Laterality Date    SHOULDER SURGERY Left        Family History   Problem Relation Age of Onset    Hypertension Mother     Hypertension Father      I have reviewed and agree with the history as documented.    E-Cigarette/Vaping     E-Cigarette/Vaping Substances     Social History     Tobacco Use    Smoking status: Every Day     Current packs/day: 0.50     Types: Cigarettes    Smokeless tobacco: Never   Substance Use Topics    Alcohol use: Yes     Comment: occ.    Drug use: Not Currently       Review of Systems "   Constitutional:  Negative for chills and fever.   HENT:  Negative for ear pain and sore throat.    Eyes:  Negative for pain and visual disturbance.   Respiratory:  Negative for cough and shortness of breath.    Cardiovascular:  Negative for chest pain and palpitations.   Gastrointestinal:  Negative for abdominal pain and vomiting.   Genitourinary:  Negative for dysuria and hematuria.   Musculoskeletal:  Positive for neck pain. Negative for arthralgias and back pain.   Skin:  Negative for color change and rash.   Neurological:  Negative for seizures and syncope.   All other systems reviewed and are negative.      Physical Exam  Physical Exam  Vitals and nursing note reviewed.   Constitutional:       General: He is not in acute distress.     Appearance: He is well-developed.   HENT:      Head: Normocephalic and atraumatic.   Eyes:      Conjunctiva/sclera: Conjunctivae normal.   Cardiovascular:      Rate and Rhythm: Normal rate and regular rhythm.      Heart sounds: No murmur heard.  Pulmonary:      Effort: Pulmonary effort is normal. No respiratory distress.      Breath sounds: Normal breath sounds.   Abdominal:      Palpations: Abdomen is soft.      Tenderness: There is no abdominal tenderness.   Musculoskeletal:         General: No swelling.      Right shoulder: Tenderness present. Normal range of motion.      Left shoulder: Normal.      Cervical back: Neck supple. Pain with movement and muscular tenderness present. No spinous process tenderness.      Thoracic back: Normal.      Lumbar back: Normal.   Skin:     General: Skin is warm and dry.      Capillary Refill: Capillary refill takes less than 2 seconds.   Neurological:      Mental Status: He is alert.   Psychiatric:         Mood and Affect: Mood normal.         Vital Signs  ED Triage Vitals   Temperature Pulse Respirations Blood Pressure SpO2   04/11/24 0851 04/11/24 0851 04/11/24 0851 04/11/24 0851 04/11/24 0851   97.8 °F (36.6 °C) 73 20 148/90 100 %       Temp Source Heart Rate Source Patient Position - Orthostatic VS BP Location FiO2 (%)   04/11/24 0851 04/11/24 0851 04/11/24 0851 04/11/24 0851 --   Oral Monitor Lying Left arm       Pain Score       04/11/24 0902       8           Vitals:    04/11/24 0851   BP: 148/90   Pulse: 73   Patient Position - Orthostatic VS: Lying         Visual Acuity      ED Medications  Medications   ketorolac (TORADOL) injection 30 mg (30 mg Intramuscular Given 4/11/24 0902)       Diagnostic Studies  Results Reviewed       None                   XR shoulder 2+ views RIGHT   ED Interpretation by Elias Vargas MD (04/11 0914)   No acute fracture or dislocation      Final Result by Ken Natarajan DO (04/11 1659)      No acute osseous abnormality.         Workstation performed: [id number]                    Procedures  Procedures         ED Course                               SBIRT 22yo+      Flowsheet Row Most Recent Value   Initial Alcohol Screen: US AUDIT-C     1. How often do you have a drink containing alcohol? 0 Filed at: 04/11/2024 0851   2. How many drinks containing alcohol do you have on a typical day you are drinking?  0 Filed at: 04/11/2024 0851   3a. Male UNDER 65: How often do you have five or more drinks on one occasion? 0 Filed at: 04/11/2024 0851   Audit-C Score 0 Filed at: 04/11/2024 0851   OWEN: How many times in the past year have you...    Used an illegal drug or used a prescription medication for non-medical reasons? Never Filed at: 04/11/2024 0851                      Medical Decision Making  38-year-old male presented to the emergency department for evaluation of neck and shoulder pain.  On arrival the patient was awake, alert, oriented and in no acute distress.  Imaging done in the emergency department on my interpretation with no acute fracture or dislocation.  Patient treated symptomatically and on reevaluation reported some improvement of symptoms.  History and physical exam consistent with  torticollis.  He is appropriate for discharge.  Patient provided with a prescription for Motrin and Robaxin.  An ambulatory referral to physical therapy was placed.  Recommendation was made for the patient to follow-up with his PCP.  Return precautions were discussed.    Amount and/or Complexity of Data Reviewed  Radiology: ordered and independent interpretation performed.    Risk  Prescription drug management.             Disposition  Final diagnoses:   Torticollis     Time reflects when diagnosis was documented in both MDM as applicable and the Disposition within this note       Time User Action Codes Description Comment    4/11/2024  9:10 AM Elias Vargas Add [M43.6] Torticollis           ED Disposition       ED Disposition   Discharge    Condition   Stable    Date/Time   Thu Apr 11, 2024 0910    Comment   Juanpablo Amaya Jr. discharge to home/self care.                   Follow-up Information       Follow up With Specialties Details Why Contact Info Additional Information    Physical Therapy at [facility] Physical Therapy Schedule an appointment as soon as possible for a visit   [address] [place] [address]  [phone] Physical Therapy at [facility], [address] [place] [address], [id number]   [phone]    [facility] Emergency Department Emergency Medicine Go to  If symptoms worsen [address] [address]  [phone] [facility] Emergency Department, [address] [address]            Discharge Medication List as of 4/11/2024  9:16 AM        START taking these medications    Details   ibuprofen (MOTRIN) 600 mg tablet Take 1 tablet (600 mg total) by mouth every 6 (six) hours as needed for mild pain or moderate pain, Starting u 4/11/2024, Normal      methocarbamol (ROBAXIN) 500 mg tablet Take 1 tablet (500 mg total) by mouth 2 (two) times a day, Starting u 4/11/2024, Normal            CONTINUE these medications which have NOT CHANGED    Details   chlorhexidine (PERIDEX) 0.12 % solution Apply 15 mL to the mouth or throat 2 (two) times a day Swish and spit twice daily., Starting Sun 9/20/2020, Normal      lisinopril (ZESTRIL) 20 mg tablet Take 20 mg by mouth daily, Historical Med                 PDMP Review       None            ED Provider  Electronically Signed by             Elias Vargas MD  04/11/24 2778

## 2024-04-20 ENCOUNTER — HOSPITAL ENCOUNTER (EMERGENCY)
Facility: HOSPITAL | Age: 39
Discharge: HOME | End: 2024-04-20
Attending: INTERNAL MEDICINE
Payer: COMMERCIAL

## 2024-04-20 ENCOUNTER — APPOINTMENT (EMERGENCY)
Dept: CT IMAGING | Facility: HOSPITAL | Age: 39
End: 2024-04-20

## 2024-04-20 VITALS
TEMPERATURE: 98 F | SYSTOLIC BLOOD PRESSURE: 136 MMHG | DIASTOLIC BLOOD PRESSURE: 90 MMHG | HEART RATE: 76 BPM | RESPIRATION RATE: 18 BRPM | OXYGEN SATURATION: 99 %

## 2024-04-20 DIAGNOSIS — M54.2 NECK PAIN: Primary | ICD-10-CM

## 2024-04-20 PROCEDURE — 96372 THER/PROPH/DIAG INJ SC/IM: CPT

## 2024-04-20 PROCEDURE — 99283 EMERGENCY DEPT VISIT LOW MDM: CPT

## 2024-04-20 PROCEDURE — 72125 CT NECK SPINE W/O DYE: CPT

## 2024-04-20 PROCEDURE — 99284 EMERGENCY DEPT VISIT MOD MDM: CPT | Performed by: INTERNAL MEDICINE

## 2024-04-20 RX ORDER — KETOROLAC TROMETHAMINE 30 MG/ML
30 INJECTION, SOLUTION INTRAMUSCULAR; INTRAVENOUS ONCE
Status: COMPLETED | OUTPATIENT
Start: 2024-04-20 | End: 2024-04-20

## 2024-04-20 RX ORDER — DIAZEPAM 5 MG/ML
2.5 INJECTION, SOLUTION INTRAMUSCULAR; INTRAVENOUS ONCE
Status: COMPLETED | OUTPATIENT
Start: 2024-04-20 | End: 2024-04-20

## 2024-04-20 RX ADMIN — KETOROLAC TROMETHAMINE 30 MG: 30 INJECTION, SOLUTION INTRAMUSCULAR at 13:15

## 2024-04-20 RX ADMIN — DIAZEPAM 2.5 MG: 5 INJECTION, SOLUTION INTRAMUSCULAR; INTRAVENOUS at 13:15

## 2024-04-20 NOTE — DISCHARGE INSTRUCTIONS
Continue taking the muscle relaxant and pain medication as needed.  Follow-up with orthopedics .  CT spine cervical without contrast   Final Result      No cervical spine fracture or traumatic malalignment.      Mild cervical spondylosis as above.            Workstation performed: [id number]

## 2024-04-20 NOTE — ED PROVIDER NOTES
History  Chief Complaint   Patient presents with    Neck Pain     Pt presents to ED from home w/ neck pain for 3 weeks ago, seen one week ago in ED for same.  PT states not getting better, has not gone for follow up or does not have PCP.     This is 39 years old came for having neck pain going into his RUE.  Patient has this pain for 3 weeks.  Patient denies any fall or trauma to the neck or the arm.  Patient is stated that sometimes he has numbness of the fourth and fifth fingers.  Patient was working at the [employer] and he was fired because he cannot continue working because of the pain of the neck.  Patient denies slurred speech or arm weakness.  Patient able to move his arm in all directions.  Patient was at the ER about 1 week ago and he was discharged on Robaxin muscle relaxant.  Patient did not talk to his doctor about this pain.  Patient has previous left shoulder surgery.  Patient sitting comfortably at the ER.        Prior to Admission Medications   Prescriptions Last Dose Informant Patient Reported? Taking?   chlorhexidine (PERIDEX) 0.12 % solution   No No   Sig: Apply 15 mL to the mouth or throat 2 (two) times a day Swish and spit twice daily.   ibuprofen (MOTRIN) 600 mg tablet   No No   Sig: Take 1 tablet (600 mg total) by mouth every 6 (six) hours as needed for mild pain or moderate pain   lisinopril (ZESTRIL) 20 mg tablet   Yes No   Sig: Take 20 mg by mouth daily   methocarbamol (ROBAXIN) 500 mg tablet   No No   Sig: Take 1 tablet (500 mg total) by mouth 2 (two) times a day      Facility-Administered Medications: None       Past Medical History:   Diagnosis Date    Hypertension        Past Surgical History:   Procedure Laterality Date    SHOULDER SURGERY Left        Family History   Problem Relation Age of Onset    Hypertension Mother     Hypertension Father      I have reviewed and agree with the history as documented.    E-Cigarette/Vaping     E-Cigarette/Vaping Substances     Social History      Tobacco Use    Smoking status: Every Day     Current packs/day: 0.50     Types: Cigarettes    Smokeless tobacco: Never   Substance Use Topics    Alcohol use: Yes     Comment: occ.    Drug use: Not Currently       Review of Systems   Constitutional:  Negative for chills and fever.   HENT:  Negative for ear pain and sore throat.    Eyes:  Negative for pain and visual disturbance.   Respiratory:  Negative for cough and shortness of breath.    Cardiovascular:  Negative for chest pain and palpitations.   Gastrointestinal:  Negative for abdominal pain and vomiting.   Genitourinary:  Negative for dysuria and hematuria.   Musculoskeletal:  Positive for neck pain. Negative for arthralgias, back pain, gait problem and joint swelling.   Skin:  Negative for color change and rash.   Neurological:  Negative for seizures and syncope.   All other systems reviewed and are negative.      Physical Exam  Physical Exam  Vitals and nursing note reviewed.   Constitutional:       General: He is not in acute distress.     Appearance: Normal appearance. He is well-developed. He is not ill-appearing, toxic-appearing or diaphoretic.   HENT:      Head: Normocephalic and atraumatic.      Right Ear: Ear canal normal.      Left Ear: Ear canal normal.      Nose: No congestion or rhinorrhea.      Mouth/Throat:      Mouth: Mucous membranes are moist.   Eyes:      Extraocular Movements: Extraocular movements intact.      Conjunctiva/sclera: Conjunctivae normal.      Pupils: Pupils are equal, round, and reactive to light.   Neck:      Vascular: No carotid bruit.   Cardiovascular:      Rate and Rhythm: Normal rate and regular rhythm.      Heart sounds: No murmur heard.  Pulmonary:      Effort: Pulmonary effort is normal. No respiratory distress.      Breath sounds: Normal breath sounds.   Abdominal:      General: There is no distension.      Palpations: Abdomen is soft.      Tenderness: There is no abdominal tenderness. There is no right CVA  tenderness, left CVA tenderness, guarding or rebound.   Musculoskeletal:         General: No swelling, tenderness, deformity or signs of injury. Normal range of motion.      Cervical back: Normal range of motion and neck supple. No rigidity or tenderness.      Right lower leg: No edema.   Lymphadenopathy:      Cervical: No cervical adenopathy.   Skin:     General: Skin is warm and dry.      Capillary Refill: Capillary refill takes less than 2 seconds.      Coloration: Skin is not jaundiced or pale.      Findings: No bruising, erythema, lesion or rash.   Neurological:      General: No focal deficit present.      Mental Status: He is alert and oriented to person, place, and time.      Cranial Nerves: No cranial nerve deficit.      Sensory: No sensory deficit.      Motor: No weakness.      Coordination: Coordination normal.      Gait: Gait normal.      Deep Tendon Reflexes: Reflexes normal.   Psychiatric:         Mood and Affect: Mood normal.         Behavior: Behavior normal.         Vital Signs  ED Triage Vitals   Temperature Pulse Respirations Blood Pressure SpO2   04/20/24 1256 04/20/24 1256 04/20/24 1256 04/20/24 1256 04/20/24 1256   98 °F (36.7 °C) 82 16 155/89 98 %      Temp src Heart Rate Source Patient Position - Orthostatic VS BP Location FiO2 (%)   -- 04/20/24 1457 04/20/24 1457 04/20/24 1457 --    Monitor Sitting Left arm       Pain Score       04/20/24 1315       9           Vitals:    04/20/24 1256 04/20/24 1457   BP: 155/89 136/90   Pulse: 82 76   Patient Position - Orthostatic VS:  Sitting         Visual Acuity      ED Medications  Medications   ketorolac (TORADOL) injection 30 mg (30 mg Intramuscular Given 4/20/24 1315)   diazepam (VALIUM) injection 2.5 mg (2.5 mg Intramuscular Given 4/20/24 1315)       Diagnostic Studies  Results Reviewed       None                   CT spine cervical without contrast   Final Result by Chris Rivera MD (04/20 8236)      No cervical spine fracture or traumatic  malalignment.      Mild cervical spondylosis as above.            Workstation performed: [id number]                    Procedures  Procedures         ED Course                                             Medical Decision Making  This is a 39 years old came for having neck pain radiating to the right upper extremity.  Patient has this for 3 weeks.  Patient came to the ER about 1 week ago and he was discharged on muscle relaxant.  Patient denies any fall or trauma to it.  Patient has full ROM of the right shoulder and right upper extremity.  Examination of the neck; no tenderness no deformity no muscle spasm.  Examination of the RUE; sensations intact, neurovascular intact, has full ROM.  No focal neurological deficits.  Patient was at the ER last time we did x-ray for his shoulder which came back negative.  Ct cervical spine; DEGENERATIVE CHANGES: Mild multilevel cervical degenerative changes are noted without critical central canal stenosis. This is most prominent at C5-C6.  No cervical spine fracture or traumatic malalignment.   Mild cervical spondylosis as above.  Differential diagnoses include but not limited to; radiculopathy, degenerative changes of cervical spine, nerve impingement,.  Neck muscle spasm, trapezius muscle spasm.  Case is discussed with the patient and told him that he need to follow-up with orthopedist.  And he may need also physical therapy.    Amount and/or Complexity of Data Reviewed  Radiology: ordered.     Details: Ct cervical spine; DEGENERATIVE CHANGES: Mild multilevel cervical degenerative changes are noted without critical central canal stenosis. This is most prominent at C5-C6.  No cervical spine fracture or traumatic malalignment.   Mild cervical spondylosis as above.             Risk  Prescription drug management.             Disposition  Final diagnoses:   Neck pain     Time reflects when diagnosis was documented in both MDM as applicable and the Disposition within this note        Time User Action Codes Description Comment    4/20/2024  2:42 PM Luis Armando Byrd Add [M54.2] Neck pain           ED Disposition       ED Disposition   Discharge    Condition   Stable    Date/Time   Sat Apr 20, 2024  2:42 PM    Comment   Juanpablo Amaya Jr. discharge to home/self care.                   Follow-up Information       Follow up With Specialties Details Why Contact Info    Santi Nguyen DO Orthopedic Surgery In 1 week  [address] [place] [address]  [phone]              Discharge Medication List as of 4/20/2024  2:43 PM        CONTINUE these medications which have NOT CHANGED    Details   chlorhexidine (PERIDEX) 0.12 % solution Apply 15 mL to the mouth or throat 2 (two) times a day Swish and spit twice daily., Starting Sun 9/20/2020, Normal      ibuprofen (MOTRIN) 600 mg tablet Take 1 tablet (600 mg total) by mouth every 6 (six) hours as needed for mild pain or moderate pain, Starting Thu 4/11/2024, Normal      lisinopril (ZESTRIL) 20 mg tablet Take 20 mg by mouth daily, Historical Med      methocarbamol (ROBAXIN) 500 mg tablet Take 1 tablet (500 mg total) by mouth 2 (two) times a day, Starting Thu 4/11/2024, Normal             No discharge procedures on file.    PDMP Review       None            ED Provider  Electronically Signed by             Luis Armando Byrd MD  04/20/24 4451

## 2024-05-15 ENCOUNTER — OFFICE VISIT (OUTPATIENT)
Dept: OBGYN CLINIC | Facility: CLINIC | Age: 39
End: 2024-05-15
Payer: COMMERCIAL

## 2024-05-15 VITALS
HEART RATE: 69 BPM | HEIGHT: 71 IN | SYSTOLIC BLOOD PRESSURE: 145 MMHG | DIASTOLIC BLOOD PRESSURE: 85 MMHG | BODY MASS INDEX: 26.6 KG/M2 | WEIGHT: 190 LBS

## 2024-05-15 DIAGNOSIS — M54.12 RADICULOPATHY, CERVICAL REGION: ICD-10-CM

## 2024-05-15 DIAGNOSIS — M54.2 NECK PAIN: Primary | ICD-10-CM

## 2024-05-15 PROCEDURE — 99204 OFFICE O/P NEW MOD 45 MIN: CPT | Performed by: ORTHOPAEDIC SURGERY

## 2024-05-15 RX ORDER — METHYLPREDNISOLONE 4 MG/1
TABLET ORAL
Qty: 21 TABLET | Refills: 0 | Status: SHIPPED | OUTPATIENT
Start: 2024-05-15

## 2024-05-15 NOTE — PROGRESS NOTES
[facility] [address] [address] [phone]       Juanpablo Amaya Jr.  [id number]  1985    ORTHOPAEDIC SURGERY OUTPATIENT NOTE  5/15/2024      HISTORY:  39 y.o. male  presents for initial evaluation of his neck, upper back and down the right arm. He reports numbness and tingling in the hand and forearm. He reports symptoms for about 6 weeks. He denies trauma or fall. He was seen twice in the ED. He was prescribed motrin and robaxin.  He does report pain rating down to the lateral shoulder.    Past Medical History:   Diagnosis Date    Hypertension        Past Surgical History:   Procedure Laterality Date    SHOULDER SURGERY Left        Social History     Socioeconomic History    Marital status: Single     Spouse name: Not on file    Number of children: Not on file    Years of education: Not on file    Highest education level: Not on file   Occupational History    Not on file   Tobacco Use    Smoking status: Every Day     Current packs/day: 0.50     Types: Cigarettes    Smokeless tobacco: Never   Substance and Sexual Activity    Alcohol use: Yes     Comment: occ.    Drug use: Not Currently    Sexual activity: Not on file   Other Topics Concern    Not on file   Social History Narrative    Not on file     Social Determinants of Health     Financial Resource Strain: Not on file   Food Insecurity: Not on file   Transportation Needs: Not on file   Physical Activity: Not on file   Stress: Not on file   Social Connections: Not on file   Intimate Partner Violence: Not on file   Housing Stability: Not on file       Family History   Problem Relation Age of Onset    Hypertension Mother     Hypertension Father         Patient's Medications   New Prescriptions    No medications on file   Previous Medications    CHLORHEXIDINE (PERIDEX) 0.12 % SOLUTION    Apply 15 mL to the mouth or throat 2 (two) times a day Swish and spit twice  "daily.    IBUPROFEN (MOTRIN) 600 MG TABLET    Take 1 tablet (600 mg total) by mouth every 6 (six) hours as needed for mild pain or moderate pain    LISINOPRIL (ZESTRIL) 20 MG TABLET    Take 20 mg by mouth daily    METHOCARBAMOL (ROBAXIN) 500 MG TABLET    Take 1 tablet (500 mg total) by mouth 2 (two) times a day   Modified Medications    No medications on file   Discontinued Medications    No medications on file       No Known Allergies     /85 (BP Location: Left arm, Patient Position: Sitting, Cuff Size: Standard)   Pulse 69   Ht 5' 11\" (1.803 m)   Wt 86.2 kg (190 lb)   BMI 26.50 kg/m²      REVIEW OF SYSTEMS:  Constitutional: Negative.    HEENT: Negative.    Respiratory: Negative.    Skin: Negative.    Neurological: Negative.    Psychiatric/Behavioral: Negative.  Musculoskeletal: Negative except for that mentioned in the HPI.    /85 (BP Location: Left arm, Patient Position: Sitting, Cuff Size: Standard)   Pulse 69   Ht 5' 11\" (1.803 m)   Wt 86.2 kg (190 lb)   BMI 26.50 kg/m²   Gen: No acute distress, resting comfortably in bed  HEENT: Eyes clear, moist mucus membranes, hearing intact  Respiratory: No audible wheezing or stridor  Cardiovascular: Well Perfused peripherally, 2+ distal pulse  Abdomen: nondistended, no peritoneal signs     PHYSICAL EXAM:    CERVICAL SPINE:   Limited side bending, full flexion and extension. Holding head flexed to the right  Spurling's: positive right    RIGHT SHOULDER:      Forward flexion:   180 degrees   Abduction:  90 degrees   External rotation at 90 degrees abduction:   90 degrees   Internal rotation at 90 degrees abduction:  90 degrees   External rotation at 0 degrees:   70 degrees   Internal rotation: T7     STRENGTH:  Forward flexion:  4+/5   Abduction:  5/5   External rotation:  5/5   Internal rotation:  5/5     Mario's mildly positive  Speed's negative  Yergasons: negative    Radial/median/ulnar nerve intact    <2 sec cap refill          IMAGING:  XR of the " right shoulder reviewed in the chart/PACS, this demonstrates no acute fracture or subluxation, no significant degenerative changes.     CT of the cervical spine reviewed in PACS, demonstrating reversal of cervical lordosis and degenerative changes at C6/C7.     ASSESSMENT AND PLAN:  39 y.o. male with right-sided neck pain and cervical radiculopathy with degenerative changes seen on C6/C7 on his CT scan.  He has positive Spurling's.  Given his imaging and exam findings recommend follow-up with spine and pain.  We will prescribe Medrol Dosepak in the meantime.  We will see him back as needed.    Scribe Attestation      I,:  Kyle Antunez PA-C am acting as a scribe while in the presence of the attending physician.:       I,:  Santi Nguyen personally performed the services described in this documentation    as scribed in my presence.:

## 2024-07-29 ENCOUNTER — OFFICE VISIT (OUTPATIENT)
Dept: FAMILY MEDICINE CLINIC | Facility: CLINIC | Age: 39
End: 2024-07-29
Payer: COMMERCIAL

## 2024-07-29 VITALS
HEART RATE: 84 BPM | HEIGHT: 71 IN | WEIGHT: 205 LBS | SYSTOLIC BLOOD PRESSURE: 130 MMHG | BODY MASS INDEX: 28.7 KG/M2 | DIASTOLIC BLOOD PRESSURE: 98 MMHG | OXYGEN SATURATION: 98 % | RESPIRATION RATE: 16 BRPM

## 2024-07-29 DIAGNOSIS — Z00.8 EXAM FOR POPULATION SURVEY: ICD-10-CM

## 2024-07-29 DIAGNOSIS — Z00.00 ENCOUNTER FOR ANNUAL PHYSICAL EXAM: ICD-10-CM

## 2024-07-29 DIAGNOSIS — I10 PRIMARY HYPERTENSION: Primary | ICD-10-CM

## 2024-07-29 DIAGNOSIS — F41.8 MIXED ANXIETY AND DEPRESSIVE DISORDER: ICD-10-CM

## 2024-07-29 DIAGNOSIS — Z13.6 SCREENING FOR CARDIOVASCULAR CONDITION: ICD-10-CM

## 2024-07-29 DIAGNOSIS — Z13.1 SCREENING FOR DIABETES MELLITUS: ICD-10-CM

## 2024-07-29 DIAGNOSIS — K21.9 GASTROESOPHAGEAL REFLUX DISEASE, UNSPECIFIED WHETHER ESOPHAGITIS PRESENT: ICD-10-CM

## 2024-07-29 PROCEDURE — 99396 PREV VISIT EST AGE 40-64: CPT | Performed by: FAMILY MEDICINE

## 2024-07-29 PROCEDURE — 99204 OFFICE O/P NEW MOD 45 MIN: CPT | Performed by: FAMILY MEDICINE

## 2024-07-29 RX ORDER — ESCITALOPRAM OXALATE 20 MG/1
20 TABLET ORAL DAILY
Qty: 30 TABLET | Refills: 3 | Status: SHIPPED | OUTPATIENT
Start: 2024-07-29

## 2024-07-29 RX ORDER — OMEPRAZOLE 40 MG/1
40 CAPSULE, DELAYED RELEASE ORAL
Qty: 30 CAPSULE | Refills: 3 | Status: SHIPPED | OUTPATIENT
Start: 2024-07-29

## 2024-07-29 RX ORDER — LISINOPRIL 20 MG/1
20 TABLET ORAL DAILY
Qty: 30 TABLET | Refills: 3 | Status: SHIPPED | OUTPATIENT
Start: 2024-07-29

## 2024-07-29 RX ORDER — ESCITALOPRAM OXALATE 20 MG/1
20 TABLET ORAL DAILY
COMMUNITY
Start: 2024-05-15 | End: 2024-07-29 | Stop reason: SDUPTHER

## 2024-07-29 NOTE — PATIENT INSTRUCTIONS
"Patient Education     Routine physical for adults   The Basics   Written by the doctors and editors at [facility]   What is a physical? -- A physical is a routine visit, or \"check-up,\" with your doctor. You might also hear it called a \"wellness visit\" or \"preventive visit.\"  During each visit, the doctor will:   Ask about your physical and mental health   Ask about your habits, behaviors, and lifestyle   Do an exam   Give you vaccines if needed   Talk to you about any medicines you take   Give advice about your health   Answer your questions  Getting regular check-ups is an important part of taking care of your health. It can help your doctor find and treat any problems you have. But it's also important for preventing health problems.  A routine physical is different from a \"sick visit.\" A sick visit is when you see a doctor because of a health concern or problem. Since physicals are scheduled ahead of time, you can think about what you want to ask the doctor.  How often should I get a physical? -- It depends on your age and health. In general, for people age 21 years and older:   If you are younger than 50 years, you might be able to get a physical every 3 years.   If you are 50 years or older, your doctor might recommend a physical every year.  If you have an ongoing health condition, like diabetes or high blood pressure, your doctor will probably want to see you more often.  What happens during a physical? -- In general, each visit will include:   Physical exam - The doctor or nurse will check your height, weight, heart rate, and blood pressure. They will also look at your eyes and ears. They will ask about how you are feeling and whether you have any symptoms that bother you.   Medicines - It's a good idea to bring a list of all the medicines you take to each doctor visit. Your doctor will talk to you about your medicines and answer any questions. Tell them if you are having any side effects that bother you. You " "should also tell them if you are having trouble paying for any of your medicines.   Habits and behaviors - This includes:   Your diet   Your exercise habits   Whether you smoke, drink alcohol, or use drugs   Whether you are sexually active   Whether you feel safe at home  Your doctor will talk to you about things you can do to improve your health and lower your risk of health problems. They will also offer help and support. For example, if you want to quit smoking, they can give you advice and might prescribe medicines. If you want to improve your diet or get more physical activity, they can help you with this, too.   Lab tests, if needed - The tests you get will depend on your age and situation. For example, your doctor might want to check your:   Cholesterol   Blood sugar   Iron level   Vaccines - The recommended vaccines will depend on your age, health, and what vaccines you already had. Vaccines are very important because they can prevent certain serious or deadly infections.   Discussion of screening - \"Screening\" means checking for diseases or other health problems before they cause symptoms. Your doctor can recommend screening based on your age, risk, and preferences. This might include tests to check for:   Cancer, such as breast, prostate, cervical, ovarian, colorectal, prostate, lung, or skin cancer   Sexually transmitted infections, such as chlamydia and gonorrhea   Mental health conditions like depression and anxiety  Your doctor will talk to you about the different types of screening tests. They can help you decide which screenings to have. They can also explain what the results might mean.   Answering questions - The physical is a good time to ask the doctor or nurse questions about your health. If needed, they can refer you to other doctors or specialists, too.  Adults older than 65 years often need other care, too. As you get older, your doctor will talk to you about:   How to prevent falling at " home   Hearing or vision tests   Memory testing   How to take your medicines safely   Making sure that you have the help and support you need at home  All topics are updated as new evidence becomes available and our peer review process is complete.  This topic retrieved from [employer] on: May 02, 2024.  Topic 860558 Version 1.0  Release: 32.4.3 - C32.122  © 2024 UpToDate, Inc. and/or its affiliates. All rights reserved.  Consumer Information Use and Disclaimer   Disclaimer: This generalized information is a limited summary of diagnosis, treatment, and/or medication information. It is not meant to be comprehensive and should be used as a tool to help the user understand and/or assess potential diagnostic and treatment options. It does NOT include all information about conditions, treatments, medications, side effects, or risks that may apply to a specific patient. It is not intended to be medical advice or a substitute for the medical advice, diagnosis, or treatment of a health care provider based on the health care provider's examination and assessment of a patient's specific and unique circumstances. Patients must speak with a health care provider for complete information about their health, medical questions, and treatment options, including any risks or benefits regarding use of medications. This information does not endorse any treatments or medications as safe, effective, or approved for treating a specific patient. UpToDate, Inc. and its affiliates disclaim any warranty or liability relating to this information or the use thereof.The use of this information is governed by the Terms of Use, available at https://www.woltersSelenokhoduwer.com/en/know/clinical-effectiveness-terms. 2024© UpToDate, Inc. and its affiliates and/or licensors. All rights reserved.  Copyright   © 2024 UpToDate, Inc. and/or its affiliates. All rights reserved.

## 2024-07-29 NOTE — ASSESSMENT & PLAN NOTE
Blood pressure high but ran out of medication. Will restart lisinopril 20 mg daily. Pt advised to continue low Na diet and to exercise on a regular basis. Will check labs.

## 2024-07-29 NOTE — PROGRESS NOTES
Ambulatory Visit  Name: Juanpablo Amaya Jr.      : 1985      MRN: [id number]  Encounter Provider: Chris Martinez MD  Encounter Date: 2024   Encounter department: [facility] MEDICINE    Assessment & Plan   1. Primary hypertension  Assessment & Plan:  Blood pressure high but ran out of medication. Will restart lisinopril 20 mg daily. Pt advised to continue low Na diet and to exercise on a regular basis. Will check labs.   Orders:  -     Lipid panel; Future  -     Comprehensive metabolic panel; Future  -     CBC and differential; Future  -     lisinopril (ZESTRIL) 20 mg tablet; Take 1 tablet (20 mg total) by mouth daily  2. Mixed anxiety and depressive disorder  Assessment & Plan:  Will restart medication. Patient sees therapist weekly.   Orders:  -     escitalopram (LEXAPRO) 20 mg tablet; Take 1 tablet (20 mg total) by mouth daily  3. Gastroesophageal reflux disease, unspecified whether esophagitis present  Assessment & Plan:  Patient gets daily symptoms. Will start medication and discussed GERD diet.   Orders:  -     omeprazole (PriLOSEC) 40 MG capsule; Take 1 capsule (40 mg total) by mouth daily before breakfast  4. Encounter for annual physical exam  Assessment & Plan:  CPE done. Last Tdap was in 2017. Had COVID vaccine. Will check labs. Pt advised to follow a well balanced, heart healthy diet and to exercise on a regular basis.   Orders:  -     Lipid panel; Future  -     Comprehensive metabolic panel; Future  -     CBC and differential; Future  5. Screening for cardiovascular condition  -     Lipid panel; Future  6. Screening for diabetes mellitus  -     Comprehensive metabolic panel; Future  7. Exam for population survey  -     Lipid panel; Future  -     Comprehensive metabolic panel; Future  -     CBC and differential; Future         History of Present Illness     Patient here for new patient visit. Patient doing ok. No chest pain or shortness of breath. No headaches. No abdominal  pain. Patient has Hypertension but off medication for past few weeks. Patient walks a lot. Patient vapes but trying to quit. No ETOH use. Patient also has history of depression and was on lexapro. Ran out 1 month ago. Sees therapist weekly. Patient also gets daily GERD.       Review of Systems   Constitutional:  Negative for activity change, appetite change, fatigue and unexpected weight change.   HENT:  Negative for congestion, ear pain, rhinorrhea, sore throat and trouble swallowing.    Eyes:  Negative for pain, discharge and visual disturbance.   Respiratory:  Negative for cough, shortness of breath and wheezing.    Cardiovascular:  Negative for chest pain, palpitations and leg swelling.   Gastrointestinal:  Negative for abdominal pain, constipation, diarrhea, nausea and vomiting.        GERD   Endocrine: Negative for polydipsia, polyphagia and polyuria.   Genitourinary:  Negative for difficulty urinating and hematuria.   Musculoskeletal:  Negative for arthralgias, back pain, gait problem, myalgias and neck pain.   Skin:  Negative for color change and rash.   Neurological:  Negative for dizziness, weakness and headaches.   Hematological:  Negative for adenopathy. Does not bruise/bleed easily.   Psychiatric/Behavioral:  Positive for dysphoric mood. Negative for sleep disturbance. The patient is nervous/anxious.      Past Medical History:   Diagnosis Date   • Hypertension      Past Surgical History:   Procedure Laterality Date   • SHOULDER SURGERY Left      Family History   Problem Relation Age of Onset   • Hypertension Mother    • Hypertension Father      Social History     Tobacco Use   • Smoking status: Every Day     Current packs/day: 0.50     Types: Cigarettes   • Smokeless tobacco: Never   Vaping Use   • Vaping status: Every Day   • Substances: Nicotine, Flavoring   Substance and Sexual Activity   • Alcohol use: Not Currently   • Drug use: Not Currently   • Sexual activity: Not Currently     Current  "Outpatient Medications on File Prior to Visit   Medication Sig   • [DISCONTINUED] escitalopram (LEXAPRO) 20 mg tablet Take 20 mg by mouth daily   • ibuprofen (MOTRIN) 600 mg tablet Take 1 tablet (600 mg total) by mouth every 6 (six) hours as needed for mild pain or moderate pain (Patient not taking: Reported on 7/29/2024)   • [DISCONTINUED] chlorhexidine (PERIDEX) 0.12 % solution Apply 15 mL to the mouth or throat 2 (two) times a day Swish and spit twice daily. (Patient not taking: Reported on 5/15/2024)   • [DISCONTINUED] lisinopril (ZESTRIL) 20 mg tablet Take 20 mg by mouth daily (Patient not taking: Reported on 7/29/2024)   • [DISCONTINUED] methocarbamol (ROBAXIN) 500 mg tablet Take 1 tablet (500 mg total) by mouth 2 (two) times a day (Patient not taking: Reported on 5/15/2024)   • [DISCONTINUED] methylPREDNISolone 4 MG tablet therapy pack Use as directed on package     No Known Allergies  Immunization History   Administered Date(s) Administered   • COVID-19 Pfizer vac (Kalin-sucrose, gray cap) 12 yr+ IM 04/06/2023   • Tdap 05/24/2011, 07/10/2017     Objective     /98 (BP Location: Left arm, Patient Position: Sitting, Cuff Size: Standard)   Pulse 84   Resp 16   Ht 5' 11\" (1.803 m)   Wt 93 kg (205 lb)   SpO2 98%   BMI 28.59 kg/m²     Physical Exam  Vitals and nursing note reviewed.   Constitutional:       General: He is not in acute distress.     Appearance: Normal appearance. He is well-developed. He is not ill-appearing.   HENT:      Head: Normocephalic and atraumatic.      Right Ear: Tympanic membrane, ear canal and external ear normal.      Left Ear: Tympanic membrane, ear canal and external ear normal.      Nose: Nose normal. No congestion or rhinorrhea.      Mouth/Throat:      Mouth: Oropharynx is clear and moist. Mucous membranes are moist.      Pharynx: Oropharynx is clear. No posterior oropharyngeal erythema.   Eyes:      General:         Right eye: No discharge.      Extraocular Movements: EOM " normal.      Conjunctiva/sclera: Conjunctivae normal.      Pupils: Pupils are equal, round, and reactive to light.   Neck:      Thyroid: No thyromegaly.   Cardiovascular:      Rate and Rhythm: Normal rate and regular rhythm.      Heart sounds: No murmur heard.  Pulmonary:      Effort: Pulmonary effort is normal. No respiratory distress.      Breath sounds: Normal breath sounds. No wheezing.   Abdominal:      General: Bowel sounds are normal.      Palpations: Abdomen is soft. There is no mass.      Tenderness: There is no abdominal tenderness.   Musculoskeletal:         General: No swelling, deformity or edema. Normal range of motion.      Cervical back: Normal range of motion and neck supple. No tenderness.      Right lower leg: No edema.      Left lower leg: No edema.   Lymphadenopathy:      Cervical: No cervical adenopathy.   Skin:     General: Skin is warm and dry.      Capillary Refill: Capillary refill takes less than 2 seconds.      Findings: No erythema or rash.   Neurological:      General: No focal deficit present.      Mental Status: He is alert and oriented to person, place, and time.      Sensory: No sensory deficit.   Psychiatric:         Mood and Affect: Mood and affect and mood normal.         Behavior: Behavior normal.         Thought Content: Thought content normal.         Judgment: Judgment normal.

## 2024-07-29 NOTE — ASSESSMENT & PLAN NOTE
CPE done. Last Tdap was in 2017. Had COVID vaccine. Will check labs. Pt advised to follow a well balanced, heart healthy diet and to exercise on a regular basis.

## 2024-08-21 ENCOUNTER — APPOINTMENT (OUTPATIENT)
Dept: LAB | Facility: HOSPITAL | Age: 39
End: 2024-08-21
Payer: COMMERCIAL

## 2024-08-21 DIAGNOSIS — Z13.6 SCREENING FOR CARDIOVASCULAR CONDITION: ICD-10-CM

## 2024-08-21 DIAGNOSIS — I10 PRIMARY HYPERTENSION: ICD-10-CM

## 2024-08-21 DIAGNOSIS — Z00.8 EXAM FOR POPULATION SURVEY: ICD-10-CM

## 2024-08-21 DIAGNOSIS — Z00.00 ENCOUNTER FOR ANNUAL PHYSICAL EXAM: ICD-10-CM

## 2024-08-21 DIAGNOSIS — Z13.1 SCREENING FOR DIABETES MELLITUS: ICD-10-CM

## 2024-08-21 LAB
ALBUMIN SERPL BCG-MCNC: 4.5 G/DL (ref 3.5–5)
ALP SERPL-CCNC: 56 U/L (ref 34–104)
ALT SERPL W P-5'-P-CCNC: 27 U/L (ref 7–52)
ANION GAP SERPL CALCULATED.3IONS-SCNC: 9 MMOL/L (ref 4–13)
AST SERPL W P-5'-P-CCNC: 19 U/L (ref 13–39)
BASOPHILS # BLD AUTO: 0.05 THOUSANDS/ÂΜL (ref 0–0.1)
BASOPHILS NFR BLD AUTO: 1 % (ref 0–1)
BILIRUB SERPL-MCNC: 0.75 MG/DL (ref 0.2–1)
BUN SERPL-MCNC: 12 MG/DL (ref 5–25)
CALCIUM SERPL-MCNC: 9.6 MG/DL (ref 8.4–10.2)
CHLORIDE SERPL-SCNC: 103 MMOL/L (ref 96–108)
CHOLEST SERPL-MCNC: 171 MG/DL
CO2 SERPL-SCNC: 25 MMOL/L (ref 21–32)
CREAT SERPL-MCNC: 0.82 MG/DL (ref 0.6–1.3)
EOSINOPHIL # BLD AUTO: 0.21 THOUSAND/ÂΜL (ref 0–0.61)
EOSINOPHIL NFR BLD AUTO: 3 % (ref 0–6)
ERYTHROCYTE [DISTWIDTH] IN BLOOD BY AUTOMATED COUNT: 12.7 % (ref 11.6–15.1)
GFR SERPL CREATININE-BSD FRML MDRD: 109 ML/MIN/1.73SQ M
GLUCOSE P FAST SERPL-MCNC: 91 MG/DL (ref 65–99)
HCT VFR BLD AUTO: 43.6 % (ref 36.5–49.3)
HDLC SERPL-MCNC: 39 MG/DL
HGB BLD-MCNC: 14.8 G/DL (ref 12–17)
IMM GRANULOCYTES # BLD AUTO: 0.02 THOUSAND/UL (ref 0–0.2)
IMM GRANULOCYTES NFR BLD AUTO: 0 % (ref 0–2)
LDLC SERPL CALC-MCNC: 99 MG/DL (ref 0–100)
LYMPHOCYTES # BLD AUTO: 2.01 THOUSANDS/ÂΜL (ref 0.6–4.47)
LYMPHOCYTES NFR BLD AUTO: 28 % (ref 14–44)
MCH RBC QN AUTO: 30 PG (ref 26.8–34.3)
MCHC RBC AUTO-ENTMCNC: 33.9 G/DL (ref 31.4–37.4)
MCV RBC AUTO: 88 FL (ref 82–98)
MONOCYTES # BLD AUTO: 0.57 THOUSAND/ÂΜL (ref 0.17–1.22)
MONOCYTES NFR BLD AUTO: 8 % (ref 4–12)
NEUTROPHILS # BLD AUTO: 4.34 THOUSANDS/ÂΜL (ref 1.85–7.62)
NEUTS SEG NFR BLD AUTO: 60 % (ref 43–75)
NONHDLC SERPL-MCNC: 132 MG/DL
NRBC BLD AUTO-RTO: 0 /100 WBCS
PLATELET # BLD AUTO: 329 THOUSANDS/UL (ref 149–390)
PMV BLD AUTO: 9.5 FL (ref 8.9–12.7)
POTASSIUM SERPL-SCNC: 3.9 MMOL/L (ref 3.5–5.3)
PROT SERPL-MCNC: 7.4 G/DL (ref 6.4–8.4)
RBC # BLD AUTO: 4.93 MILLION/UL (ref 3.88–5.62)
SODIUM SERPL-SCNC: 137 MMOL/L (ref 135–147)
TRIGL SERPL-MCNC: 166 MG/DL
WBC # BLD AUTO: 7.2 THOUSAND/UL (ref 4.31–10.16)

## 2024-08-21 PROCEDURE — 80061 LIPID PANEL: CPT

## 2024-08-21 PROCEDURE — 80053 COMPREHEN METABOLIC PANEL: CPT

## 2024-08-21 PROCEDURE — 85025 COMPLETE CBC W/AUTO DIFF WBC: CPT

## 2024-08-21 PROCEDURE — 36415 COLL VENOUS BLD VENIPUNCTURE: CPT

## 2024-08-26 ENCOUNTER — OFFICE VISIT (OUTPATIENT)
Dept: FAMILY MEDICINE CLINIC | Facility: CLINIC | Age: 39
End: 2024-08-26
Payer: COMMERCIAL

## 2024-08-26 VITALS
HEIGHT: 71 IN | BODY MASS INDEX: 28.7 KG/M2 | SYSTOLIC BLOOD PRESSURE: 118 MMHG | HEART RATE: 66 BPM | WEIGHT: 205 LBS | OXYGEN SATURATION: 97 % | DIASTOLIC BLOOD PRESSURE: 86 MMHG

## 2024-08-26 DIAGNOSIS — F41.8 MIXED ANXIETY AND DEPRESSIVE DISORDER: ICD-10-CM

## 2024-08-26 DIAGNOSIS — I10 PRIMARY HYPERTENSION: Primary | ICD-10-CM

## 2024-08-26 DIAGNOSIS — K21.9 GASTROESOPHAGEAL REFLUX DISEASE, UNSPECIFIED WHETHER ESOPHAGITIS PRESENT: ICD-10-CM

## 2024-08-26 DIAGNOSIS — F17.210 CIGARETTE NICOTINE DEPENDENCE WITHOUT COMPLICATION: ICD-10-CM

## 2024-08-26 PROCEDURE — 99214 OFFICE O/P EST MOD 30 MIN: CPT | Performed by: FAMILY MEDICINE

## 2024-08-26 NOTE — ASSESSMENT & PLAN NOTE
Blood pressure ok. Continue lisinopril 20 mg daily. Pt advised to continue low Na diet and to exercise on a regular basis. CBC, CMP, and lipids ok in August 2024.

## 2024-08-26 NOTE — PROGRESS NOTES
Ambulatory Visit  Name: Juanpablo Amaya Jr.      : 1985      MRN: [id number]  Encounter Provider: Chris Martinez MD  Encounter Date: 2024   Encounter department: [facility]    Assessment & Plan   1. Primary hypertension  Assessment & Plan:  Blood pressure ok. Continue lisinopril 20 mg daily. Pt advised to continue low Na diet and to exercise on a regular basis. CBC, CMP, and lipids ok in 2024.   2. Gastroesophageal reflux disease, unspecified whether esophagitis present  Assessment & Plan:  Doing better. Continue omeprazole 40 mg and cut back to every other day and GERD diet.   3. Mixed anxiety and depressive disorder  Assessment & Plan:  Mood and stress level ok. Continue escitalopram 20 mg daily. Patient sees therapist weekly.   4. Cigarette nicotine dependence without complication  Assessment & Plan:  Discussed quitting vaping.       Depression Screening and Follow-up Plan: Patient was screened for depression during today's encounter. They screened negative with a PHQ-9 score of 2.        History of Present Illness     Patient here for follow-up HTM, GERD, Anxiety, Depression. Patient doing ok. No chest pain or shortness of breath. No headaches. No recent GERD since on medication. Blood pressure better back on lisinopril 20 mg daily. Mood has been good. Sees therapist  weekly. No new complaints.       Review of Systems   Constitutional:  Negative for activity change, appetite change and fatigue.   Respiratory:  Negative for chest tightness and shortness of breath.    Cardiovascular:  Negative for chest pain and palpitations.   Gastrointestinal:  Negative for abdominal pain, diarrhea, nausea and vomiting.   Neurological:  Negative for dizziness, tremors, light-headedness and headaches.   Psychiatric/Behavioral:  Negative for agitation, decreased concentration, dysphoric mood, self-injury, sleep disturbance and suicidal ideas. The patient is not nervous/anxious.   "    Past Medical History:   Diagnosis Date   • Hypertension      Past Surgical History:   Procedure Laterality Date   • SHOULDER SURGERY Left      Family History   Problem Relation Age of Onset   • Hypertension Mother    • Hypertension Father      Social History     Tobacco Use   • Smoking status: Every Day     Current packs/day: 0.50     Types: Cigarettes   • Smokeless tobacco: Never   Vaping Use   • Vaping status: Every Day   • Substances: Nicotine, Flavoring   Substance and Sexual Activity   • Alcohol use: Not Currently   • Drug use: Not Currently   • Sexual activity: Not Currently     Current Outpatient Medications on File Prior to Visit   Medication Sig   • escitalopram (LEXAPRO) 20 mg tablet Take 1 tablet (20 mg total) by mouth daily   • lisinopril (ZESTRIL) 20 mg tablet Take 1 tablet (20 mg total) by mouth daily   • omeprazole (PriLOSEC) 40 MG capsule Take 1 capsule (40 mg total) by mouth daily before breakfast   • [DISCONTINUED] ibuprofen (MOTRIN) 600 mg tablet Take 1 tablet (600 mg total) by mouth every 6 (six) hours as needed for mild pain or moderate pain (Patient not taking: Reported on 7/29/2024)     No Known Allergies  Immunization History   Administered Date(s) Administered   • COVID-19 Pfizer vac (Kalin-sucrose, gray cap) 12 yr+ IM 04/06/2023   • Tdap 05/24/2011, 07/10/2017     Objective     /86 (BP Location: Left arm, Patient Position: Sitting, Cuff Size: Standard)   Pulse 66   Ht 5' 11\" (1.803 m)   Wt 93 kg (205 lb)   SpO2 97%   BMI 28.59 kg/m²     Physical Exam  Vitals and nursing note reviewed.   Constitutional:       Appearance: Normal appearance.   Neck:      Vascular: No carotid bruit.   Cardiovascular:      Rate and Rhythm: Normal rate and regular rhythm.      Heart sounds: Normal heart sounds. No murmur heard.  Pulmonary:      Effort: Pulmonary effort is normal.      Breath sounds: Normal breath sounds. No wheezing.   Musculoskeletal:      Cervical back: Normal range of motion and " neck supple. No muscular tenderness.      Right lower leg: No edema.      Left lower leg: No edema.   Lymphadenopathy:      Cervical: No cervical adenopathy.   Neurological:      Mental Status: He is alert.   Psychiatric:         Mood and Affect: Mood normal.         Behavior: Behavior normal.         Thought Content: Thought content normal.         Judgment: Judgment normal.

## 2024-10-09 ENCOUNTER — APPOINTMENT (EMERGENCY)
Dept: RADIOLOGY | Facility: HOSPITAL | Age: 39
End: 2024-10-09
Payer: COMMERCIAL

## 2024-10-09 ENCOUNTER — HOSPITAL ENCOUNTER (EMERGENCY)
Facility: HOSPITAL | Age: 39
Discharge: HOME | End: 2024-10-09
Attending: EMERGENCY MEDICINE
Payer: COMMERCIAL

## 2024-10-09 VITALS
DIASTOLIC BLOOD PRESSURE: 81 MMHG | HEART RATE: 78 BPM | SYSTOLIC BLOOD PRESSURE: 137 MMHG | RESPIRATION RATE: 20 BRPM | TEMPERATURE: 97.8 F | OXYGEN SATURATION: 99 %

## 2024-10-09 DIAGNOSIS — M25.512 LEFT SHOULDER PAIN: Primary | ICD-10-CM

## 2024-10-09 PROCEDURE — 96372 THER/PROPH/DIAG INJ SC/IM: CPT

## 2024-10-09 PROCEDURE — 99283 EMERGENCY DEPT VISIT LOW MDM: CPT

## 2024-10-09 PROCEDURE — 73030 X-RAY EXAM OF SHOULDER: CPT

## 2024-10-09 PROCEDURE — 99284 EMERGENCY DEPT VISIT MOD MDM: CPT | Performed by: EMERGENCY MEDICINE

## 2024-10-09 RX ORDER — NAPROXEN 500 MG/1
500 TABLET ORAL 2 TIMES DAILY WITH MEALS
Qty: 30 TABLET | Refills: 0 | Status: SHIPPED | OUTPATIENT
Start: 2024-10-09

## 2024-10-09 RX ORDER — LIDOCAINE 50 MG/G
1 PATCH TOPICAL ONCE
Status: DISCONTINUED | OUTPATIENT
Start: 2024-10-09 | End: 2024-10-09 | Stop reason: HOSPADM

## 2024-10-09 RX ORDER — ACETAMINOPHEN 325 MG/1
975 TABLET ORAL ONCE
Status: COMPLETED | OUTPATIENT
Start: 2024-10-09 | End: 2024-10-09

## 2024-10-09 RX ORDER — KETOROLAC TROMETHAMINE 30 MG/ML
15 INJECTION, SOLUTION INTRAMUSCULAR; INTRAVENOUS ONCE
Status: COMPLETED | OUTPATIENT
Start: 2024-10-09 | End: 2024-10-09

## 2024-10-09 RX ORDER — KETOROLAC TROMETHAMINE 30 MG/ML
15 INJECTION, SOLUTION INTRAMUSCULAR; INTRAVENOUS ONCE
Status: DISCONTINUED | OUTPATIENT
Start: 2024-10-09 | End: 2024-10-09

## 2024-10-09 RX ADMIN — LIDOCAINE 1 PATCH: 50 PATCH CUTANEOUS at 15:53

## 2024-10-09 RX ADMIN — ACETAMINOPHEN 975 MG: 325 TABLET, FILM COATED ORAL at 15:51

## 2024-10-09 RX ADMIN — KETOROLAC TROMETHAMINE 15 MG: 30 INJECTION, SOLUTION INTRAMUSCULAR at 15:52

## 2024-10-09 NOTE — Clinical Note
Juanpablo Amaya was seen and treated in our emergency department on 10/9/2024.            Limited use of left shoulder until cleared by orthopedics, primary care or employee health.    Diagnosis: Left shoulder injury    Juanpablo  may return to work on return date.    He may return on this date: 10/11/2024    Pt able to return to work, no use of left arm until cleared by orthopedist     If you have any questions or concerns, please don't hesitate to call.      Ming Parker, DO    ______________________________           _______________          _______________  Hospital Representative                              Date                                Time

## 2024-10-09 NOTE — ED PROVIDER NOTES
Final diagnoses:   None     ED Disposition       None          Assessment & Plan       Medical Decision Making  Patient with left shoulder pain.  Feels that he dislocated his shoulder last week.  Has pain with the empty can test as well as abduction test.  Neurovascularly intact.  He has no deformity of the shoulder to suggest a current dislocation.    Differential including but not limited to shoulder pain, dislocation, fracture, soft tissue injury.    Will evaluate for Hill-Sachs deformity or other deformity secondary to dislocation.  Will refer to orthopedic surgery.    X-ray without acute findings per my read.  Will discharge home.  Follow-up with orthopedics.    Problems Addressed:  Left shoulder pain: acute illness or injury             Medications - No data to display    ED Risk Strat Scores                           SBIRT 22yo+      Flowsheet Row Most Recent Value   Initial Alcohol Screen: US AUDIT-C     1. How often do you have a drink containing alcohol? 0 Filed at: 10/09/2024 1521   2. How many drinks containing alcohol do you have on a typical day you are drinking?  0 Filed at: 10/09/2024 1521   3a. Male UNDER 65: How often do you have five or more drinks on one occasion? 0 Filed at: 10/09/2024 1521   Audit-C Score 0 Filed at: 10/09/2024 1521   OWEN: How many times in the past year have you...    Used an illegal drug or used a prescription medication for non-medical reasons? Never Filed at: 10/09/2024 1521                            History of Present Illness       Chief Complaint   Patient presents with    Shoulder Pain     Pt reports his left shoulder popping out of its socket last week. Pt states he was able to get his shoulder back into its socket but still has pain. No meds pta        Past Medical History:   Diagnosis Date    Hypertension       Past Surgical History:   Procedure Laterality Date    SHOULDER SURGERY Left       Family History   Problem Relation Age of Onset    Hypertension Mother      Hypertension Father       Social History     Tobacco Use    Smoking status: Former     Current packs/day: 0.50     Types: Cigarettes    Smokeless tobacco: Never   Vaping Use    Vaping status: Every Day    Substances: Nicotine, Flavoring   Substance Use Topics    Alcohol use: Not Currently    Drug use: Not Currently      E-Cigarette/Vaping    E-Cigarette Use Current Every Day User       E-Cigarette/Vaping Substances    Nicotine Yes     THC No     CBD No     Flavoring Yes     Other No     Unknown No       I have reviewed and agree with the history as documented.     Right handed 39-year-old male previous history of left shoulder surgery approximately 10 years ago by [facility].  Presents the emergency department for left shoulder pain.  Reports that he was standing on the back of a vehicle.  He slipped and grabbed the tailgate of the vehicle with his left arm.  He felt a pop in his shoulder.  Felt like it was dislocated.  States that he has dislocated many times previously prior to getting shoulder surgery.    He was able to get the shoulder back and since then notes pain when he flexes the shoulder forward especially if he pronates the arm while doing so.    Denies numbness.  He is taken nothing for the pain.  He works in at [employer].     Reports a previous surgery was a small shoulder for the ligaments.       Shoulder Pain  Location:  Shoulder  Shoulder location:  L shoulder  Injury: yes    Time since incident:  1 week  Pain details:     Quality:  Aching  Handedness:  Right-handed  Prior injury to area:  Yes  Relieved by:  Being still  Worsened by:  Bearing weight, exercise and movement      Review of Systems   Musculoskeletal:  Positive for arthralgias.   All other systems reviewed and are negative.          Objective       ED Triage Vitals [10/09/24 1523]   Temperature Pulse Blood Pressure Respirations SpO2 Patient Position - Orthostatic VS   97.8 °F (36.6 °C) 78 137/81 20 99 % Lying      Temp Source  Heart Rate Source BP Location FiO2 (%) Pain Score    Oral Monitor Right arm -- --      Vitals      Date and Time Temp Pulse SpO2 Resp BP Pain Score FACES Pain Rating User   10/09/24 1523 97.8 °F (36.6 °C) 78 99 % 20 137/81 -- --             Physical Exam  Vitals and nursing note reviewed.   Constitutional:       General: He is not in acute distress.     Appearance: He is well-developed. He is not diaphoretic.   HENT:      Head: Normocephalic and atraumatic.      Right Ear: External ear normal.      Left Ear: External ear normal.   Eyes:      Conjunctiva/sclera: Conjunctivae normal.   Neck:      Trachea: No tracheal deviation.   Cardiovascular:      Rate and Rhythm: Normal rate and regular rhythm.      Heart sounds: Normal heart sounds. No murmur heard.     Comments: Normal radial pulse.  Pulmonary:      Effort: No respiratory distress.      Breath sounds: Normal breath sounds. No stridor. No wheezing or rales.   Abdominal:      General: Bowel sounds are normal. There is no distension.      Palpations: Abdomen is soft. There is no mass.      Tenderness: There is no abdominal tenderness. There is no guarding or rebound.   Musculoskeletal:         General: No tenderness or deformity.      Comments: Pain with empty can test.  Pain when abducting left shoulder against force.   Skin:     General: Skin is dry.      Findings: No rash.   Neurological:      Motor: No abnormal muscle tone.      Coordination: Coordination normal.      Comments: Median, radial, ulnar, recurrent median branches intact to motor and sensation.  Normal sensation throughout the left upper extremity.   Psychiatric:         Behavior: Behavior normal.         Thought Content: Thought content normal.         Judgment: Judgment normal.         Results Reviewed       None            No orders to display       Procedures    ED Medication and Procedure Management   Prior to Admission Medications   Prescriptions Last Dose Informant Patient Reported? Taking?    escitalopram (LEXAPRO) 20 mg tablet   No No   Sig: Take 1 tablet (20 mg total) by mouth daily   lisinopril (ZESTRIL) 20 mg tablet   No No   Sig: Take 1 tablet (20 mg total) by mouth daily   omeprazole (PriLOSEC) 40 MG capsule   No No   Sig: Take 1 capsule (40 mg total) by mouth daily before breakfast      Facility-Administered Medications: None     Patient's Medications   Discharge Prescriptions    No medications on file     No discharge procedures on file.  ED SEPSIS DOCUMENTATION            Ming Parker,   10/09/24 [id number]

## 2024-10-09 NOTE — ED NOTES
Discharge reviewed with patient. Patient verbalized understanding and has no further questions at this time. Patient ambulatory off unit with steady gait.       Minerva Atkins RN  10/09/24 7522

## 2024-10-09 NOTE — DISCHARGE INSTRUCTIONS
Xray showed no significant abnormalities.  Follow-up with orthopedic surgery soon as possible.      Come back for new or worsening symptoms.    Take the naproxen every 12 hours as needed for pain.  You may take Tylenol every 6 hours as well.  You could also use lidocaine patches 12 hours on then 12 hours off.  They are sold under the brand Salonpas or TLM Com.

## 2024-10-11 ENCOUNTER — APPOINTMENT (EMERGENCY)
Dept: RADIOLOGY | Facility: HOSPITAL | Age: 39
End: 2024-10-11
Payer: COMMERCIAL

## 2024-10-11 ENCOUNTER — HOSPITAL ENCOUNTER (EMERGENCY)
Facility: HOSPITAL | Age: 39
Discharge: HOME | End: 2024-10-11
Attending: EMERGENCY MEDICINE
Payer: COMMERCIAL

## 2024-10-11 VITALS
TEMPERATURE: 97.6 F | HEART RATE: 65 BPM | DIASTOLIC BLOOD PRESSURE: 74 MMHG | RESPIRATION RATE: 20 BRPM | SYSTOLIC BLOOD PRESSURE: 130 MMHG | OXYGEN SATURATION: 95 %

## 2024-10-11 DIAGNOSIS — S43.035A: Primary | ICD-10-CM

## 2024-10-11 DIAGNOSIS — M25.512 ACUTE PAIN OF LEFT SHOULDER: ICD-10-CM

## 2024-10-11 PROCEDURE — 73030 X-RAY EXAM OF SHOULDER: CPT

## 2024-10-11 PROCEDURE — 99284 EMERGENCY DEPT VISIT MOD MDM: CPT | Performed by: EMERGENCY MEDICINE

## 2024-10-11 PROCEDURE — 99283 EMERGENCY DEPT VISIT LOW MDM: CPT

## 2024-10-11 PROCEDURE — 73020 X-RAY EXAM OF SHOULDER: CPT

## 2024-10-11 PROCEDURE — 23650 CLTX SHO DSLC W/MNPJ WO ANES: CPT | Performed by: EMERGENCY MEDICINE

## 2024-10-11 RX ORDER — PROPOFOL 10 MG/ML
100 INJECTION, EMULSION INTRAVENOUS ONCE
Status: COMPLETED | OUTPATIENT
Start: 2024-10-11 | End: 2024-10-11

## 2024-10-11 RX ORDER — IBUPROFEN 600 MG/1
600 TABLET, FILM COATED ORAL ONCE
Status: COMPLETED | OUTPATIENT
Start: 2024-10-11 | End: 2024-10-11

## 2024-10-11 RX ORDER — ACETAMINOPHEN 325 MG/1
975 TABLET ORAL ONCE
Status: COMPLETED | OUTPATIENT
Start: 2024-10-11 | End: 2024-10-11

## 2024-10-11 RX ADMIN — PROPOFOL 100 MG: 10 INJECTION, EMULSION INTRAVENOUS at 19:38

## 2024-10-11 RX ADMIN — ACETAMINOPHEN 975 MG: 325 TABLET, FILM COATED ORAL at 19:00

## 2024-10-11 RX ADMIN — IBUPROFEN 600 MG: 600 TABLET, FILM COATED ORAL at 19:00

## 2024-10-11 NOTE — ED PROVIDER NOTES
Time reflects when diagnosis was documented in both MDM as applicable and the Disposition within this note       Time User Action Codes Description Comment    10/11/2024  8:05 PM Mario Santizo [S43.035A] Luxatio erecta of left shoulder, initial encounter     10/11/2024  8:05 PM Mario Santizo [M25.512] Acute pain of left shoulder           ED Disposition       ED Disposition   Discharge    Condition   Stable    Date/Time   Fri Oct 11, 2024  8:07 PM    Comment   Juanpablo Amaya Jr. discharge to home/self care.                   Assessment & Plan       Medical Decision Making  39-year-old male presents with left shoulder pain, inability to put his left arm down, and some possible deformity in the left axillary region, consistent with a possible luxatio erecta.  The patient will be evaluated with an x-ray of the shoulder, and if dislocated it will be reduced.  He will be given ibuprofen and Tylenol for pain.    The patient's shoulder was reduced successfully without complication, the patient was monitored carefully to ensure that the patient's sedation wore off successfully and the patient was able to be safely discharged home.  The sedation wore off quickly and without complication, return indications and discharge instructions were given, the patient reports that he already has an appointment with orthopedic surgery in 3 days.    Amount and/or Complexity of Data Reviewed  Radiology: ordered.    Risk  OTC drugs.  Prescription drug management.        ED Course as of 10/12/24 1437   Fri Oct 11, 2024   1828 My wet read of the patient's x-ray of the left shoulder shows an anterior-inferior dislocation, with no obvious signs of fracture or other abnormalities.       Medications   acetaminophen (TYLENOL) tablet 975 mg (975 mg Oral Given 10/11/24 1900)   ibuprofen (MOTRIN) tablet 600 mg (600 mg Oral Given 10/11/24 1900)   propofol (DIPRIVAN) 200 MG/20ML bolus injection 100 mg (100 mg Intravenous Given  10/11/24 1938)       ED Risk Strat Scores               History of Present Illness       Chief Complaint   Patient presents with    Shoulder Pain     Pt states he was reaching up and his left shoulder slipped out of place, states he had surgery on it several years ago and this has happened in the past. Already has appointment with ortho on Monday.        Past Medical History:   Diagnosis Date    Hypertension       Past Surgical History:   Procedure Laterality Date    SHOULDER SURGERY Left       Family History   Problem Relation Age of Onset    Hypertension Mother     Hypertension Father       Social History     Tobacco Use    Smoking status: Former     Current packs/day: 0.50     Types: Cigarettes    Smokeless tobacco: Never   Vaping Use    Vaping status: Every Day    Substances: Nicotine, Flavoring   Substance Use Topics    Alcohol use: Not Currently    Drug use: Not Currently      E-Cigarette/Vaping    E-Cigarette Use Current Every Day User       E-Cigarette/Vaping Substances    Nicotine Yes     THC No     CBD No     Flavoring Yes     Other No     Unknown No       I have reviewed and agree with the history as documented.     39-year-old male presents with 1 day of severe pain in his left shoulder after he was reaching up to the top bunk and his shelter, felt a sudden pop and reports that he is now unable to put his arm down.  The patient reports he has no numbness and tingling in his hand, no weakness in his  strength, he has significant pain in the shoulder, but no pain in his neck, did not injure any other part of his body, his review of systems is otherwise negative.  The patient reports that he previously has had surgery in the left shoulder due to a ligamentous injury, was seen here few days ago for a suspected dislocation at that time.  The patient reports he is in recovery, does not drink, uses a vape pen, does not use any drugs.        Review of Systems   Constitutional:  Negative for fever.   HENT:   Negative for congestion.    Eyes:  Negative for visual disturbance.   Respiratory:  Negative for cough and shortness of breath.    Cardiovascular:  Negative for chest pain.   Gastrointestinal:  Negative for abdominal pain, diarrhea and vomiting.   Endocrine: Negative for polyuria.   Genitourinary:  Negative for dysuria and hematuria.   Musculoskeletal:  Positive for arthralgias and myalgias.   Neurological:  Negative for light-headedness and headaches.           Objective       ED Triage Vitals   Temperature Pulse Blood Pressure Respirations SpO2 Patient Position - Orthostatic VS   10/11/24 1803 10/11/24 1801 10/11/24 1801 10/11/24 1801 10/11/24 1801 10/11/24 1801   97.6 °F (36.4 °C) 77 163/97 20 98 % Sitting      Temp Source Heart Rate Source BP Location FiO2 (%) Pain Score    10/11/24 1803 10/11/24 1801 10/11/24 1801 -- 10/11/24 1801    Oral Monitor Right arm  6      Vitals      Date and Time Temp Pulse SpO2 Resp BP Pain Score FACES Pain Rating User   10/11/24 2010 -- 65 95 % 20 130/74 -- -- KLB   10/11/24 2009 -- 69 94 % 43 -- -- -- KLB   10/11/24 2006 -- 67 94 % 30 -- -- -- KLB   10/11/24 2005 -- -- -- -- 130/73 -- -- KLB   10/11/24 2003 -- 63 97 % 16 -- -- -- KLB   10/11/24 2000 -- 65 95 % 15 124/63 -- -- KLB   10/11/24 1957 -- 63 95 % 31 -- -- -- KLB   10/11/24 1955 -- 62 95 % 18 125/69 -- -- KLB   10/11/24 1954 -- 66 -- -- -- -- -- KLB   10/11/24 1952 -- 68 96 % 43 -- -- -- KLB   10/11/24 1950 -- 64 97 % 16 133/72 -- -- KLB   10/11/24 1948 -- 66 97 % 33 -- -- -- KLB   10/11/24 1945 -- 63 96 % 18 141/79 6 -- KLB   10/11/24 1942 -- 66 95 % 28 -- -- -- KLB   10/11/24 1940 -- 72 96 % 14 142/82 -- -- KLB   10/11/24 1939 -- 72 98 % 28 -- -- -- KLB   10/11/24 1936 -- 62 98 % 27 -- -- -- KLB   10/11/24 1935 -- 63 98 % 23 168/98 -- -- KLB   10/11/24 1933 -- 59 99 % 27 -- -- -- KLB   10/11/24 1930 -- 62 98 % 16 163/96 -- -- KLB   10/11/24 1900 -- -- -- -- -- 6 -- KLB   10/11/24 1803 97.6 °F (36.4 °C) -- -- -- -- -- --  KR   10/11/24 1801 -- 77 98 % 20 163/97 6 -- KR            Physical Exam  Vitals and nursing note reviewed.   Constitutional:       General: He is not in acute distress.     Appearance: Normal appearance.   HENT:      Head: Normocephalic and atraumatic.      Right Ear: External ear normal.      Left Ear: External ear normal.      Mouth/Throat:      Mouth: Mucous membranes are moist.      Pharynx: Oropharynx is clear.   Eyes:      Conjunctiva/sclera: Conjunctivae normal.      Pupils: Pupils are equal, round, and reactive to light.   Cardiovascular:      Rate and Rhythm: Normal rate.   Pulmonary:      Effort: Pulmonary effort is normal. No respiratory distress.   Abdominal:      General: Abdomen is flat. There is no distension.   Musculoskeletal:         General: Normal range of motion.      Cervical back: Normal range of motion.      Comments: Patient left upper extremity appears to be stuck completely abducted, with some possible deformity in the left axillary region, with circulation, sensation, and motor function intact distal to the affected area.   Skin:     General: Skin is warm and dry.   Neurological:      General: No focal deficit present.      Mental Status: He is alert and oriented to person, place, and time.   Psychiatric:         Mood and Affect: Mood normal.         Behavior: Behavior normal.         Results Reviewed       None            XR shoulder 1 vw LEFT POST REDUCTION   Final Interpretation by Jayson Patrick MD (10/12 1018)      Single postreduction internally rotated view of the shoulder shows apparent reduction of dislocation, however, suggest either axillary view or scapular Y view for confirmation.         Computerized Assisted Algorithm (CAA) may have been used to analyze all applicable images.         Workstation performed: [id number]         XR shoulder 2+ views LEFT   Final Interpretation by Jayson Patrick MD (10/12 1016)      Anterior shoulder dislocation with Hill-Sachs  deformity.      Findings concur with the preliminary report by the referring clinician already in PACS and/or our electronic record EPIC.         Computerized Assisted Algorithm (CAA) may have been used to analyze all applicable images.         Workstation performed: [id number]             Procedural Sedation    Date/Time: 10/11/2024 8:34 PM    Performed by: Mario Santizo MD  Authorized by: Mario Santizo MD    Immediate pre-procedure details:     Reassessment: Patient reassessed immediately prior to procedure      Reviewed: vital signs and relevant labs/tests      Verified: bag valve mask available, emergency equipment available, intubation equipment available, IV patency confirmed, oxygen available and suction available    Procedure details (see MAR for exact dosages):     Preoxygenation:  Room air    Sedation:  Propofol    Analgesia: Ibuprofen and Tylenol.    Intra-procedure monitoring:  Blood pressure monitoring, cardiac monitor, continuous pulse oximetry and frequent vital sign checks    Intra-procedure events: none      Total sedation time (minutes):  5  Post-procedure details:     Attendance: Constant attendance by certified staff until patient recovered      Recovery: Patient returned to pre-procedure baseline      Post-sedation assessments completed and reviewed: post-procedure airway patency not reviewed, post-procedure cardiovascular function not reviewed, post-procedure mental status not reviewed, post-procedure nausea and vomiting status not reviewed and post-procedure respiratory function not reviewed      Patient is stable for discharge or admission: yes      Patient tolerance:  Tolerated well, no immediate complications  Orthopedic injury treatment    Date/Time: 10/11/2024 8:39 PM    Performed by: Mario Santizo MD  Authorized by: Mario Santizo MD    Patient Location:  ED  [place] Protocol:  Procedure performed by: [facility] [address]  sedation)  Consent: Verbal consent obtained. Written consent obtained.  Risks and benefits: risks, benefits and alternatives were discussed  Consent given by: patient  Patient identity confirmed: verbally with patient and arm band    Injury location:  Shoulder  Location details:  Left shoulder  Injury type:  Dislocation  Dislocation type: inferior    Chronicity:  New  Neurovascular status: Neurovascularly intact    Distal perfusion: normal    Neurological function: normal    Range of motion: normal    Local anesthesia used?: No    General anesthesia used?: No    Sedation type:  Moderate (conscious) sedation (See separate Procedural Sedation form)  Manipulation performed?: Yes    Reduction method: external rotation  Skeletal traction used?: No    Reduction successful?: Yes    Confirmation: Reduction confirmed by x-ray    Immobilization:  Sling  Neurovascular status: Neurovascularly intact    Distal perfusion: normal    Neurological function: normal    Range of motion: normal    Patient tolerance:  Patient tolerated the procedure well with no immediate complications      ED Medication and Procedure Management   Prior to Admission Medications   Prescriptions Last Dose Informant Patient Reported? Taking?   escitalopram (LEXAPRO) 20 mg tablet   No No   Sig: Take 1 tablet (20 mg total) by mouth daily   lisinopril (ZESTRIL) 20 mg tablet   No No   Sig: Take 1 tablet (20 mg total) by mouth daily   naproxen (Naprosyn) 500 mg tablet   No No   Sig: Take 1 tablet (500 mg total) by mouth 2 (two) times a day with meals   omeprazole (PriLOSEC) 40 MG capsule   No No   Sig: Take 1 capsule (40 mg total) by mouth daily before breakfast      Facility-Administered Medications: None     Discharge Medication List as of 10/11/2024  8:10 PM        CONTINUE these medications which have NOT CHANGED    Details   escitalopram (LEXAPRO) 20 mg tablet Take 1 tablet (20 mg total) by mouth daily, Starting Mon 7/29/2024, Normal      lisinopril  (ZESTRIL) 20 mg tablet Take 1 tablet (20 mg total) by mouth daily, Starting Mon 7/29/2024, Normal      naproxen (Naprosyn) 500 mg tablet Take 1 tablet (500 mg total) by mouth 2 (two) times a day with meals, Starting Wed 10/9/2024, Normal      omeprazole (PriLOSEC) 40 MG capsule Take 1 capsule (40 mg total) by mouth daily before breakfast, Starting Mon 7/29/2024, Normal           No discharge procedures on file.  ED SEPSIS DOCUMENTATION   Time reflects when diagnosis was documented in both MDM as applicable and the Disposition within this note       Time User Action Codes Description Comment    10/11/2024  8:05 PM Mario Santizo [S43.035A] Luxatio erecta of left shoulder, initial encounter     10/11/2024  8:05 PM Mario Santizo [M25.512] Acute pain of left shoulder                  Mario Santizo MD  10/11/24 2048       Mario Santizo MD  10/12/24 1447

## 2024-10-12 NOTE — ED NOTES
Discharge reviewed with patient. Patient verbalized understanding and no further questions at this time. Patient ambulatory off unit with steady gait.      Beverley Espinal  10/11/24 2125

## 2024-10-14 ENCOUNTER — HOSPITAL ENCOUNTER (OUTPATIENT)
Dept: RADIOLOGY | Facility: HOSPITAL | Age: 39
Discharge: HOME | End: 2024-10-14
Attending: STUDENT IN AN ORGANIZED HEALTH CARE EDUCATION/TRAINING PROGRAM
Payer: COMMERCIAL

## 2024-10-14 ENCOUNTER — OFFICE VISIT (OUTPATIENT)
Dept: OBGYN CLINIC | Facility: CLINIC | Age: 39
End: 2024-10-14
Payer: COMMERCIAL

## 2024-10-14 VITALS — HEART RATE: 85 BPM | BODY MASS INDEX: 29.96 KG/M2 | HEIGHT: 71 IN | WEIGHT: 214 LBS | OXYGEN SATURATION: 97 %

## 2024-10-14 DIAGNOSIS — M24.412 RECURRENT DISLOCATION OF LEFT SHOULDER: Primary | ICD-10-CM

## 2024-10-14 DIAGNOSIS — M25.512 LEFT SHOULDER PAIN: ICD-10-CM

## 2024-10-14 PROCEDURE — 99213 OFFICE O/P EST LOW 20 MIN: CPT | Performed by: STUDENT IN AN ORGANIZED HEALTH CARE EDUCATION/TRAINING PROGRAM

## 2024-10-14 PROCEDURE — 73030 X-RAY EXAM OF SHOULDER: CPT

## 2024-10-14 NOTE — LETTER
October 14, 2024     Patient: Juanpablo Amaya Jr.  YOB: 1985  Date of Visit: 10/14/2024      To Whom it May Concern:    Juanpablo Amaya is under my professional care. Juanpablo was seen in my office on 10/14/2024. Juanpablo may return to work with light duty as of 10/15/24. No overhead activities.    If you have any questions or concerns, please don't hesitate to call.         Sincerely,          Reji Perez MD

## 2024-10-14 NOTE — PROGRESS NOTES
Initial Office Visit    Assessment:  Juanpablo Amaya Jr. is a RHD male with a history, physical examination and imaging consistent with recurrent anterior shoulder instability after prior arthroscopic anterior labral repair and stabilization approximately 8 to 10 years ago.  This has been going on for approximately 2 years despite activity modification and home exercise.  This is significantly affecting his function and quality of life his ability to work.  We discussed the nature of this diagnosis in detail. Based on his age,imaging and activity level, we did discuss that he was at significant risk for continued instability with nonsurgical treatment.  We discussed the nature of this possible diagnosis in detail.  To further evaluate the status of his left Shoulder we are going to obtain an MRI to evaluate the status of his labrum as well as the amount of anterior glenoid bone loss and the size of the Hill-Sachs lesion.  Once the MRI is obtained Juanpablo Amaya Jr. will return to the office for review of its findings and a discussion of his treatment plan.  He was given a note for work.  All of his questions were answered in detail.    Plan:    1. Recurrent dislocation of left shoulder  -     MRI shoulder left wo contrast; Future; Expected date: 10/14/2024  2. Left shoulder pain  -     Ambulatory Referral to Orthopedic Surgery  -     XR shoulder 2+ vw left; Future; Expected date: 10/14/2024      Chief Complaint:  left  shoulder pain and recurrent dislocations    History of Present Illness:  Juanpablo Amaya Jr. is a healthy, active RHD 39 y.o. male who presents to the office for evaluation of his  left  shoulder. His shoulder symptoms date back to an injury sustained when he was 14.  At that time, he used to put handcuffs on his wrists and then jump over his hands and then bring his hands from behind his back to over his back.  1 time while he was doing this, he dislocated his shoulder.  He had multiple dislocations  after that and ultimately underwent left shoulder arthroscopic Bankart repair approximately 10 years ago when he was around 28 years of age.  He did very well initially after that surgery, and then about 2 years ago, he fell on an outstretched left arm and sustained a recurrent dislocation.  Since then, he has had at least 10 dislocation events.  He does have dislocation events when he is sleeping and also when he is doing any overhead activity that involves any abduction and external rotation.  A few of these episodes have required formal reduction in the emergency room, most recently about 3 days ago.  He continues to experience activity related posterosuperior and anterior shoulder pain. He is limited with respect to overhead activity.  He reports night pain making lying on his side difficult.  He also describes mechanical symptoms in the form of clicking/catching in the ABER position.  He notes pain associated weakness.  He has tried home exercises. He is not currently taking an NSAID, has never had an injection.  He is frustrated by his continued pain and instability and the impact his symptoms are having on his life. He presents to our office for evaluation and treatment recommendation.    He works at [employer] and lifts [employer].  The symptoms have been limiting his ability to work and he is currently off from work until he can get clearance to return.  He is currently living in a shelter.  He does use tobacco products, vapes.  He has a history of a substance use disorder including methamphetamine and alcohol, however he has been clean from alcohol for 6 to 7 years and has been clean from methamphetamines for approximately 16 months.  He does have some occasional paresthesias in the arm.  He states he had a history of seizures as a child however has not had a seizure in many years.    Pain Assessment:  Character: Pain  Location: Left shoulder  Duration: Several months  Intensity: 10 out of 10 when the shoulder  is dislocated  Associated Symptoms:  Weakness and stiffness    PMHx:   Past Medical History:   Diagnosis Date    Hypertension      PSHx:   Past Surgical History:   Procedure Laterality Date    SHOULDER SURGERY Left      Medications:   Current Outpatient Medications on File Prior to Visit   Medication Sig Dispense Refill    escitalopram (LEXAPRO) 20 mg tablet Take 1 tablet (20 mg total) by mouth daily 30 tablet 3    lisinopril (ZESTRIL) 20 mg tablet Take 1 tablet (20 mg total) by mouth daily 30 tablet 3    naproxen (Naprosyn) 500 mg tablet Take 1 tablet (500 mg total) by mouth 2 (two) times a day with meals 30 tablet 0    omeprazole (PriLOSEC) 40 MG capsule Take 1 capsule (40 mg total) by mouth daily before breakfast 30 capsule 3     No current facility-administered medications on file prior to visit.     Allergies: No Known Allergies    Social History: He works at [employer] and lifts [employer].  The symptoms have been limiting his ability to work and he is currently off from work until he can get clearance to return.  He is currently living in a shelter.  He does use tobacco products, vapes.  He has a history of a substance use disorder including methamphetamine and alcohol, however he has been clean from alcohol for 6 to 7 years and has been clean from methamphetamines for approximately 16 months.    Family History:   Family History   Problem Relation Age of Onset    Hypertension Mother     Hypertension Father        Review of systems: ROS is negative other than that noted in the HPI.  Constitutional: Negative for fatigue and fever.   HENT: Negative for sore throat.    Respiratory: Negative for shortness of breath.    Cardiovascular: Negative for chest pain.   Gastrointestinal: Negative for abdominal pain.   Endocrine: Negative for cold intolerance and heat intolerance.   Genitourinary: Negative for flank pain.   Musculoskeletal: Negative for back pain.   Skin: Negative for rash.   Allergic/Immunologic: Negative for  immunocompromised state.   Neurological: Negative for dizziness.   Psychiatric/Behavioral: Negative for agitation.       Complete Physical Examination:  Vitals:    10/14/24 1436   Pulse: 85   SpO2: 97%         General Appearance: The patient is a well developed, well nourished male  in no apparent distress.  Orientation:  The patient is alert and interactive.  Oriented to time, place and person.  Mood and Affect:  The patient has normal mood and affect.  Gait and Station:  He is noted to ambulate with a normal heel toe gait pattern.  Observed standing alignment demonstrates normal physiologic valgus present.      Body Areas:    Shoulder focused exam:       RIGHT LEFT    Scapula Atrophy Negative Negative     Winging Negative Negative     Protraction Negative Negative    Rotator cuff SS 5/5 5/5     IS 5/5 5/5     SubS 5/5 5/5    ROM     140, limited by apprehension     ER0 45 45     ER90 90    Deferred due to pain     IR90 80    80     IRb L1    Deferred due to pain    TTP: AC Negative Negative     Biceps Negative Negative     SC Joint Negative Negative     Scapular Spine Negative Negative     Proximal Humerus Negative Negative    Special Tests: O'Briens Negative Negative     Cross body Adduction Negative Negative     Speeds  Negative Negative     Vibha's Negative Negative     Neer Negative Negative     Wilder Negative Negative     Bear Hug Negative Negative    Instability: Apprehension & relocation negative positive     Load & shift negative positive    Other: Crank Negative Negative             L shoulder - well healed arthroscopy portal incisions anteriorly and posteriorly.    UE NV Exam: +2 Radial pulses bilaterally  Sensation intact to light touch C5-T1 bilaterally, Radial/median/ulnar nerve motor intact      Bilateral elbow, wrist, and and forearm ROM full, painless with passive ROM, no ttp or crepitance throughout extremities below shoulder joint    Cervical ROM is full without pain, numbness or tingling.  Negative Spurling.        Radiographic Imaging:       I have personally reviewed and interpreted 4 radiographs of the left shoulder taken the office today including Grashey, AP internal rotation, scapular Y and axillary lateral which demonstrate reduced glenohumeral joint.  There are no significant degenerative changes.  On the axillary view, there is some irregularity in the contour of the anterior glenoid suggestive of possible glenoid bone loss.  These were reviewed in detail with the patient.  The radiology report is not yet available but I will review it when it is.    I also personally reviewed and interpreted plain radiographs of the left shoulder obtained in the emergency room on 10/11/2024 as well as 10/9/2024.  These do demonstrate large Hill-Sachs lesion on the posterior superior aspect of the humeral head.  They also demonstrate glenohumeral dislocation and interval reduction.

## 2024-10-22 ENCOUNTER — HOSPITAL ENCOUNTER (OUTPATIENT)
Dept: MRI IMAGING | Facility: HOSPITAL | Age: 39
Discharge: HOME | End: 2024-10-22
Attending: STUDENT IN AN ORGANIZED HEALTH CARE EDUCATION/TRAINING PROGRAM
Payer: COMMERCIAL

## 2024-10-22 DIAGNOSIS — M24.412 RECURRENT DISLOCATION OF LEFT SHOULDER: ICD-10-CM

## 2024-10-22 PROCEDURE — 73221 MRI JOINT UPR EXTREM W/O DYE: CPT

## 2024-10-29 ENCOUNTER — OFFICE VISIT (OUTPATIENT)
Dept: OBGYN CLINIC | Facility: CLINIC | Age: 39
End: 2024-10-29
Payer: COMMERCIAL

## 2024-10-29 VITALS — HEIGHT: 71 IN | WEIGHT: 214 LBS | BODY MASS INDEX: 29.96 KG/M2

## 2024-10-29 DIAGNOSIS — F17.210 CIGARETTE NICOTINE DEPENDENCE WITHOUT COMPLICATION: ICD-10-CM

## 2024-10-29 DIAGNOSIS — S42.292A HILL SACHS DEFORMITY, LEFT: ICD-10-CM

## 2024-10-29 DIAGNOSIS — M25.312 INSTABILITY OF LEFT SHOULDER JOINT: Primary | ICD-10-CM

## 2024-10-29 DIAGNOSIS — Z59.01 LIVING IN SHELTER: ICD-10-CM

## 2024-10-29 PROCEDURE — 99214 OFFICE O/P EST MOD 30 MIN: CPT | Performed by: STUDENT IN AN ORGANIZED HEALTH CARE EDUCATION/TRAINING PROGRAM

## 2024-10-29 SDOH — ECONOMIC STABILITY - HOUSING INSECURITY: SHELTERED HOMELESSNESS: Z59.01

## 2024-10-29 NOTE — PROGRESS NOTES
Initial Office Visit    Assessment:  Juanpablo Amaya Jr. 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    Plan:    I had a very lengthy discussion with Juanpablo today during which we reviewed his imaging, diagnoses, and possible treatment options.  We discussed conservative treatments including physical therapy, activity modification, bracing, RICE.  We also discussed surgical treatments.  We discussed that, given his prior Bankart repair as well as significant anterior glenoid bone loss, that he was at significant risk for recurrence with an isolated revision soft tissue procedure.  I did discuss that, ultimately a bony procedure would give him the lowest chance of recurrence.  I discussed that I would recommend a coracoid transfer / Latarjet procedure for him.  I had a lengthy discussion during which we reviewed risks, benefits, and alternatives.  I discussed risks including but not limited to nonunion of the coracoid graft, hardware breakage, neurovascular injury including to the musculocutaneous or axillary nerve, infection, wound complications, recurrent instability, arthrofibrosis and stiffness.  I discussed that his current smoking status did place him at significantly increased risk for nonunion, and I discussed that I would recommend not performing the procedure unless he had completely quit all tobacco products to minimize his risk of nonunion given the significant morbidity and recovery involved in the surgery.  I discussed that our priority should be to maximize his odds of a favorable outcome with a single surgery, and that this would involve complete smoking cessation prior to surgery.  I also discussed that this would involve extensive rehabilitation and recovery after the surgery, and we had a  lengthy discussion regarding his living and work situation and whether this was a favorable time for him to have surgery.  He states that his work has been understanding regarding his need for treatment of his shoulder, and his shelter also told him that this would be a good time and that they would be willing to assist and take care of him during his recovery and rehabilitation process.  He voiced understanding that this would require several months of physical therapy, at least 4 to 5 months, and we also discussed that his expected return to work would be approximately at 4 to 6 months.  We discussed the postoperative recovery and rehabilitation process including that he would be in a sling for approximately 3 to 4 weeks.  All questions answered.  He was encouraged to reach out via [facility] if he does have any additional questions or concerns.  With regards to his work status, he is currently unable to work at this time.    He will follow-up in 5 weeks and we will obtain a full drug panel including cotinine/nicotine test at this time.  He recently got his Chantix pills and is planning on completely quitting smoking in the next 2 weeks.  I did inform him that the nicotine test could test positive for up to 3 weeks after the last exposure.        Imaging:    All imaging from today was reviewed by myself and explained to the patient.       Injection:    No Injection planned at this time.      Surgery:     All of the risks and benefits of operative treatment were explained to the patient, as well as the risks and benefits of any alternative treatment options, including nonoperative care. This risks of surgery include, but are not limited to, infection, bleeding, blood clot, damage to nerves/arteries, need for further surgyer, cardiovascular risk, anesthesia risk, and continued pain.  The patient understood this and elects to proceed forward with surgical intervention.      Follow up:    Return in about 5 weeks (around  12/3/2024). without x-rays  Full drug panel at visit       Assessment & Plan  Instability of left shoulder joint         Hill Sachs deformity, left         Cigarette nicotine dependence without complication         Living in shelter           Chief Complaint:  Left shoulder pain    History of Present Illness:  Juanpablo Amaya Jr. is a healthy, active RHD 39 y.o. male who presents to the office for evaluation of his left shoulder. He reports since he was last seen in office on 10/14/2024 he has had 6 dislocations. He was able to self reduce all of them. He reports that 5 of them have happened during sleep.  One happened while reaching for a an object on a high shelf. He reports that he is continuing to use nicotine products. He has not relapsed on with alcohol or methamphetamines since he was last seen in clinic. He is continuing to stay in a shelter, and reports that they understand his injury and that this is a good time for him to proceed with intervention regarding his shoulder.  He has been wrapping a sling around his arm to keep him from abducting his arm to try to minimize his risk of dislocation while sleeping.      Pain Assessment:  Character:  Pain  Location: left shoulder  Duration: several months   Intensity: 10/10 when shoulder is dislocated   Associated Symptoms:  Weakness and stiffness    PMHx:   Past Medical History:   Diagnosis Date    Hypertension      PSHx:   Past Surgical History:   Procedure Laterality Date    SHOULDER SURGERY Left      Medications:   Current Outpatient Medications on File Prior to Visit   Medication Sig Dispense Refill    escitalopram (LEXAPRO) 20 mg tablet Take 1 tablet (20 mg total) by mouth daily 30 tablet 3    lisinopril (ZESTRIL) 20 mg tablet Take 1 tablet (20 mg total) by mouth daily 30 tablet 3    naproxen (Naprosyn) 500 mg tablet Take 1 tablet (500 mg total) by mouth 2 (two) times a day with meals 30 tablet 0    omeprazole (PriLOSEC) 40 MG capsule Take 1 capsule (40 mg  total) by mouth daily before breakfast 30 capsule 3     No current facility-administered medications on file prior to visit.     Allergies: No Known Allergies  Social History:  He works at [employer] and lifts [employer].  The symptoms have been limiting his ability to work and he is currently off from work until he can get clearance to return.  He is currently living in a shelter.  He does use tobacco products, vapes.  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  Constitutional: Negative for fatigue and fever.   HENT: Negative for sore throat.    Respiratory: Negative for shortness of breath.    Cardiovascular: Negative for chest pain.   Gastrointestinal: Negative for abdominal pain.   Endocrine: Negative for cold intolerance and heat intolerance.   Genitourinary: Negative for flank pain.   Musculoskeletal: Negative for back pain.   Skin: Negative for rash.   Allergic/Immunologic: Negative for immunocompromised state.   Neurological: Negative for dizziness.   Psychiatric/Behavioral: Negative for agitation.       Complete Physical Examination:    There were no vitals filed for this visit.    General Appearance: The patient is a well developed, well nourished male  in no apparent distress.  Orientation:  The patient is alert and interactive.  Oriented to time, place and person.  Mood and Affect:  The patient has normal mood and affect.    Shoulder focused exam:        RIGHT LEFT     Scapula Atrophy Negative Negative       Winging Negative Negative       Protraction Negative Negative     Rotator cuff SS 5/5 5/5       IS 5/5 5/5       SubS 5/5 5/5     ROM     140, limited  by apprehension       ER0 45 45       ER90 90    Deferred due to pain       IR90 80    80       IRb L1    Deferred due to pain     TTP: AC Negative Negative       Biceps Negative Negative       SC Joint Negative Negative       Scapular Spine Negative Negative       Proximal Humerus Negative Negative     Special Tests: O'Briens Negative Negative       Cross body Adduction Negative Negative       Speeds  Negative Negative       Vibha's Negative Negative       Neer Negative Negative       Wilder Negative Negative       Bear Hug Negative Negative     Instability: Apprehension & relocation negative positive       Load & shift negative positive     Other: Crank Negative Negative        Other: L shoulder - well healed arthroscopy portal incisions anteriorly and posteriorly.     UE NV Exam: +2 Radial pulses bilaterally  Sensation intact to light touch C5-T1 bilaterally, Radial/median/ulnar nerve motor intact    `  Bilateral elbow, wrist, and and forearm ROM full, painless with passive ROM, no ttp or crepitance throughout extremities below shoulder joint    Cervical ROM is full without pain, numbness or tingling. Negative Spurling.      Radiographic Imaging: I have personally reviewed and interpreted 4 radiographs of the left shoulder taken the office on 10/14/2024 including Grashey, AP internal rotation, scapular Y and axillary lateral which demonstrate reduced glenohumeral joint.  There are no significant degenerative changes.  On the axillary view, there is some irregularity in the contour of the anterior glenoid suggestive of possible glenoid bone loss.  These were reviewed in detail with the patient.  I reviewed the radiology report and agree with their findings.      I personally reviewed and interpreted an MRI of the left shoulder obtained on 10/22/2024 which was reviewed in detail with the patient.  This demonstrates recurrent anterior labral tear with anterior glenoid bone loss, approximately 20%.  There is a small  shallow Hill-Sachs lesion which is off track.  Mild tendinosis in the supraspinatus with no complete tear.  There are changes in the anterior glenoid from prior labral repair.

## 2024-11-26 DIAGNOSIS — F41.8 MIXED ANXIETY AND DEPRESSIVE DISORDER: ICD-10-CM

## 2024-11-27 RX ORDER — ESCITALOPRAM OXALATE 20 MG/1
20 TABLET ORAL DAILY
Qty: 90 TABLET | Refills: 1 | Status: SHIPPED | OUTPATIENT
Start: 2024-11-27

## 2024-12-01 DIAGNOSIS — I10 PRIMARY HYPERTENSION: ICD-10-CM

## 2024-12-01 DIAGNOSIS — K21.9 GASTROESOPHAGEAL REFLUX DISEASE, UNSPECIFIED WHETHER ESOPHAGITIS PRESENT: ICD-10-CM

## 2024-12-02 RX ORDER — LISINOPRIL 20 MG/1
20 TABLET ORAL DAILY
Qty: 30 TABLET | Refills: 3 | Status: SHIPPED | OUTPATIENT
Start: 2024-12-02

## 2024-12-02 RX ORDER — OMEPRAZOLE 40 MG/1
40 CAPSULE, DELAYED RELEASE ORAL
Qty: 30 CAPSULE | Refills: 3 | Status: SHIPPED | OUTPATIENT
Start: 2024-12-02

## 2024-12-05 ENCOUNTER — OFFICE VISIT (OUTPATIENT)
Dept: OBGYN CLINIC | Facility: CLINIC | Age: 39
End: 2024-12-05
Payer: COMMERCIAL

## 2024-12-05 VITALS
SYSTOLIC BLOOD PRESSURE: 149 MMHG | HEIGHT: 71 IN | WEIGHT: 214 LBS | DIASTOLIC BLOOD PRESSURE: 97 MMHG | BODY MASS INDEX: 29.96 KG/M2 | HEART RATE: 81 BPM

## 2024-12-05 DIAGNOSIS — M25.312 INSTABILITY OF LEFT SHOULDER JOINT: Primary | ICD-10-CM

## 2024-12-05 DIAGNOSIS — S42.292A HILL SACHS DEFORMITY, LEFT: ICD-10-CM

## 2024-12-05 DIAGNOSIS — Z59.01 LIVING IN SHELTER: ICD-10-CM

## 2024-12-05 DIAGNOSIS — F17.210 CIGARETTE NICOTINE DEPENDENCE WITHOUT COMPLICATION: ICD-10-CM

## 2024-12-05 PROCEDURE — 99213 OFFICE O/P EST LOW 20 MIN: CPT | Performed by: STUDENT IN AN ORGANIZED HEALTH CARE EDUCATION/TRAINING PROGRAM

## 2024-12-05 SDOH — ECONOMIC STABILITY - HOUSING INSECURITY: SHELTERED HOMELESSNESS: Z59.01

## 2024-12-06 NOTE — H&P (VIEW-ONLY)
Ortho Sports Medicine Shoulder Follow Up Visit     Assessment:  Juanpablo Amaya Jr. 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     Plan:     I again had a very lengthy discussion with Juanpablo today during which we reviewed his imaging, diagnoses, and possible treatment options.  At our prior visits, we discussed that he would possibly need a bone block procedure given significant anterior glenoid bone loss, and that he was at significant risk for recurrence with an isolated revision soft tissue procedure. I did discuss however that, prior to any surgical intervention, I would require him to quit smoking to pre-operatively optimize him and minimize his risk of complications. Juanpablo has done a fantastic job overall and has mostly quit smoking and has been taking Chantix, however he did recently take one dose of a tobacco product, and I informed him that this would likely make him test positive, so no value in taking a test at today's visit. I did commend his honesty. Given that he has made significant strides, will continue with pre-surgical planning. Will obtain CT to further evaluate bony loss and for glenoid track calculations. PT script also provided for pt to work on pre-operative strengthening. He will follow up in 3 weeks, will plan to perform drug screen at that time. Will also review CT scan results at that time. He was encouraged to reach out via [facility] if he does have any additional questions or concerns.  With regards to his work status, he is currently unable to work at this time.      Follow up:    Return in about 3 weeks (around 12/26/2024).      Chief Complaint   Patient presents with    Left Shoulder - Follow-up         History of Present Illness:    The patient is returns for follow up of his  left shoulder.  Since the prior visit, He reports minimal improvement. He has had 2 subluxation events but no sandy dislocations. He quit smoking and has been taking Chantix for the past 3 weeks, but he does admit he took one hit of a tobacco product this past week from a friend. Otherwise no changes. No new injuries, numbness, tingling, or weakness. He is eager to have surgery.     The patient has tried rest, ice, NSAIDS, and activity modification and exercises.    Shoulder Surgical History:  Prior L shoulder bankart repair    Past Medical, Social and Family History:  Past Medical History:   Diagnosis Date    Hypertension      Past Surgical History:   Procedure Laterality Date    SHOULDER SURGERY Left      No Known Allergies  Current Outpatient Medications on File Prior to Visit   Medication Sig Dispense Refill    escitalopram (LEXAPRO) 20 mg tablet TAKE 1 TABLET BY MOUTH EVERY DAY 90 tablet 1    lisinopril (ZESTRIL) 20 mg tablet TAKE 1 TABLET BY MOUTH EVERY DAY 30 tablet 3    naproxen (Naprosyn) 500 mg tablet Take 1 tablet (500 mg total) by mouth 2 (two) times a day with meals 30 tablet 0    omeprazole (PriLOSEC) 40 MG capsule TAKE 1 CAPSULE BY MOUTH EVERY DAY BEFORE BREAKFAST 30 capsule 3     No current facility-administered medications on file prior to visit.     Social History     Socioeconomic History    Marital status: Single     Spouse name: Not on file    Number of children: Not on file    Years of education: Not on file    Highest education level: Not on file   Occupational History    Not on file   Tobacco Use    Smoking status: Former     Current packs/day: 0.50     Types: Cigarettes    Smokeless tobacco: Never   Vaping Use    Vaping status: Every Day    Substances: Nicotine, Flavoring   Substance and Sexual Activity    Alcohol use: Not Currently    Drug use: Not Currently    Sexual activity: Not Currently   Other Topics Concern    Not on file   Social History Narrative    Not on file     Social Drivers  "of Health     Financial Resource Strain: Not on file   Food Insecurity: Not on file   Transportation Needs: Not on file   Physical Activity: Not on file   Stress: Not on file   Social Connections: Not on file   Intimate Partner Violence: Not on file   Housing Stability: Not on file       I have reviewed the past medical, surgical, social and family history, medications and allergies as documented in the EMR.    Review of systems: ROS is negative other than that noted in the HPI.  Constitutional: Negative for fatigue and fever.      Physical Exam:    Blood pressure 149/97, pulse 81, height 5' 11\" (1.803 m), weight 97.1 kg (214 lb).    General/Constitutional: NAD, well developed, well nourished  HENT: Normocephalic, atraumatic  CV: Intact distal pulses, regular rate  Resp: No respiratory distress or labored breathing  GI: Soft and non-tender   Lymphatic: No lymphadenopathy palpated  Neuro: Alert and Oriented x 3, no focal deficits  Psych: Normal mood, normal affect, normal judgement, normal behavior  Skin: Warm, dry, no rashes, no erythema    Shoulder focused exam:                 RIGHT LEFT     Scapula Atrophy Negative Negative       Winging Negative Negative       Protraction Negative Negative     Rotator cuff SS 5/5 5/5       IS 5/5 5/5       SubS 5/5 5/5     ROM     140, limited by apprehension       ER0 45 45       ER90 90    Deferred due to pain       IR90 80    80       IRb L1    Deferred due to pain     TTP: AC Negative Negative       Biceps Negative Negative       SC Joint Negative Negative       Scapular Spine Negative Negative       Proximal Humerus Negative Negative     Special Tests: O'Briens Negative Negative       Cross body Adduction Negative Negative       Speeds  Negative Negative       Vibha's Negative Negative       Neer Negative Negative       Wilder Negative Negative       Bear Hug Negative Negative     Instability: Apprehension & relocation negative positive       Load & shift negative " positive     Other: Crank Negative Negative          Other: L shoulder - well healed arthroscopy portal incisions anteriorly and posteriorly.       UE NV Exam: +2 Radial pulses bilaterally  Sensation intact to light touch C5-T1 bilaterally, Radial/median/ulnar nerve motor intact    Cervical ROM is full without pain, numbness or tingling      Shoulder Imaging    No imaging was performed today

## 2024-12-06 NOTE — PROGRESS NOTES
Ortho Sports Medicine Shoulder Follow Up Visit     Assessment:  Juanpablo Amaya Jr. 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     Plan:     I again had a very lengthy discussion with Juanpablo today during which we reviewed his imaging, diagnoses, and possible treatment options.  At our prior visits, we discussed that he would possibly need a bone block procedure given significant anterior glenoid bone loss, and that he was at significant risk for recurrence with an isolated revision soft tissue procedure. I did discuss however that, prior to any surgical intervention, I would require him to quit smoking to pre-operatively optimize him and minimize his risk of complications. Juanpablo has done a fantastic job overall and has mostly quit smoking and has been taking Chantix, however he did recently take one dose of a tobacco product, and I informed him that this would likely make him test positive, so no value in taking a test at today's visit. I did commend his honesty. Given that he has made significant strides, will continue with pre-surgical planning. Will obtain CT to further evaluate bony loss and for glenoid track calculations. PT script also provided for pt to work on pre-operative strengthening. He will follow up in 3 weeks, will plan to perform drug screen at that time. Will also review CT scan results at that time. He was encouraged to reach out via [employer] if he does have any additional questions or concerns.  With regards to his work status, he is currently unable to work at this time.      Follow up:    Return in about 3 weeks (around 12/26/2024).      Chief Complaint   Patient presents with    Left Shoulder - Follow-up         History of Present Illness:    The patient is returns for follow up of his  left shoulder.  Since the prior visit, He reports minimal improvement. He has had 2 subluxation events but no sandy dislocations. He quit smoking and has been taking Chantix for the past 3 weeks, but he does admit he took one hit of a tobacco product this past week from a friend. Otherwise no changes. No new injuries, numbness, tingling, or weakness. He is eager to have surgery.     The patient has tried rest, ice, NSAIDS, and activity modification and exercises.    Shoulder Surgical History:  Prior L shoulder bankart repair    Past Medical, Social and Family History:  Past Medical History:   Diagnosis Date    Hypertension      Past Surgical History:   Procedure Laterality Date    SHOULDER SURGERY Left      No Known Allergies  Current Outpatient Medications on File Prior to Visit   Medication Sig Dispense Refill    escitalopram (LEXAPRO) 20 mg tablet TAKE 1 TABLET BY MOUTH EVERY DAY 90 tablet 1    lisinopril (ZESTRIL) 20 mg tablet TAKE 1 TABLET BY MOUTH EVERY DAY 30 tablet 3    naproxen (Naprosyn) 500 mg tablet Take 1 tablet (500 mg total) by mouth 2 (two) times a day with meals 30 tablet 0    omeprazole (PriLOSEC) 40 MG capsule TAKE 1 CAPSULE BY MOUTH EVERY DAY BEFORE BREAKFAST 30 capsule 3     No current facility-administered medications on file prior to visit.     Social History     Socioeconomic History    Marital status: Single     Spouse name: Not on file    Number of children: Not on file    Years of education: Not on file    Highest education level: Not on file   Occupational History    Not on file   Tobacco Use    Smoking status: Former     Current packs/day: 0.50     Types: Cigarettes    Smokeless tobacco: Never   Vaping Use    Vaping status: Every Day    Substances: Nicotine, Flavoring   Substance and Sexual Activity    Alcohol use: Not Currently    Drug use: Not Currently    Sexual activity: Not Currently   Other Topics Concern    Not on file   Social History Narrative    Not on file     Social Drivers  "of Health     Financial Resource Strain: Not on file   Food Insecurity: Not on file   Transportation Needs: Not on file   Physical Activity: Not on file   Stress: Not on file   Social Connections: Not on file   Intimate Partner Violence: Not on file   Housing Stability: Not on file       I have reviewed the past medical, surgical, social and family history, medications and allergies as documented in the EMR.    Review of systems: ROS is negative other than that noted in the HPI.  Constitutional: Negative for fatigue and fever.      Physical Exam:    Blood pressure 149/97, pulse 81, height 5' 11\" (1.803 m), weight 97.1 kg (214 lb).    General/Constitutional: NAD, well developed, well nourished  HENT: Normocephalic, atraumatic  CV: Intact distal pulses, regular rate  Resp: No respiratory distress or labored breathing  GI: Soft and non-tender   Lymphatic: No lymphadenopathy palpated  Neuro: Alert and Oriented x 3, no focal deficits  Psych: Normal mood, normal affect, normal judgement, normal behavior  Skin: Warm, dry, no rashes, no erythema    Shoulder focused exam:                 RIGHT LEFT     Scapula Atrophy Negative Negative       Winging Negative Negative       Protraction Negative Negative     Rotator cuff SS 5/5 5/5       IS 5/5 5/5       SubS 5/5 5/5     ROM     140, limited by apprehension       ER0 45 45       ER90 90    Deferred due to pain       IR90 80    80       IRb L1    Deferred due to pain     TTP: AC Negative Negative       Biceps Negative Negative       SC Joint Negative Negative       Scapular Spine Negative Negative       Proximal Humerus Negative Negative     Special Tests: O'Briens Negative Negative       Cross body Adduction Negative Negative       Speeds  Negative Negative       Vibha's Negative Negative       Neer Negative Negative       Wilder Negative Negative       Bear Hug Negative Negative     Instability: Apprehension & relocation negative positive       Load & shift negative " positive     Other: Crank Negative Negative          Other: L shoulder - well healed arthroscopy portal incisions anteriorly and posteriorly.       UE NV Exam: +2 Radial pulses bilaterally  Sensation intact to light touch C5-T1 bilaterally, Radial/median/ulnar nerve motor intact    Cervical ROM is full without pain, numbness or tingling      Shoulder Imaging    No imaging was performed today

## 2024-12-10 ENCOUNTER — HOSPITAL ENCOUNTER (OUTPATIENT)
Dept: CT IMAGING | Facility: HOSPITAL | Age: 39
Discharge: HOME | End: 2024-12-10
Attending: STUDENT IN AN ORGANIZED HEALTH CARE EDUCATION/TRAINING PROGRAM
Payer: COMMERCIAL

## 2024-12-10 DIAGNOSIS — M25.312 INSTABILITY OF LEFT SHOULDER JOINT: ICD-10-CM

## 2024-12-10 DIAGNOSIS — S42.292A HILL SACHS DEFORMITY, LEFT: ICD-10-CM

## 2024-12-10 PROCEDURE — 73200 CT UPPER EXTREMITY W/O DYE: CPT

## 2024-12-16 ENCOUNTER — EVALUATION (OUTPATIENT)
Dept: PHYSICAL THERAPY | Facility: CLINIC | Age: 39
End: 2024-12-16
Payer: COMMERCIAL

## 2024-12-16 DIAGNOSIS — S42.292A HILL SACHS DEFORMITY, LEFT: ICD-10-CM

## 2024-12-16 DIAGNOSIS — M25.312 INSTABILITY OF LEFT SHOULDER JOINT: Primary | ICD-10-CM

## 2024-12-16 PROCEDURE — 97110 THERAPEUTIC EXERCISES: CPT

## 2024-12-16 PROCEDURE — 97162 PT EVAL MOD COMPLEX 30 MIN: CPT

## 2024-12-16 NOTE — PROGRESS NOTES
PT Evaluation     Today's date: 2024  Patient name: Juanpablo Amaya Jr.  : 1985  MRN: [id number]  Referring provider: Reji Perez MD  Dx:   Encounter Diagnosis     ICD-10-CM    1. Instability of left shoulder joint  M25.312 Ambulatory Referral to Physical Therapy      2. Hill Sachs deformity, left  S42.292A Ambulatory Referral to Physical Therapy          Start Time: 1530  Stop Time: 1615  Total time in clinic (min): 45 minutes    Assessment  Impairments: abnormal muscle firing, abnormal muscle tone, abnormal or restricted ROM, abnormal movement, impaired physical strength, lacks appropriate home exercise program, pain with function and poor posture     Assessment details: Juanpablo Amaya Jr. is a pleasant 39 y.o. male who presents with left shoulder instability.  The patient's greatest concerns are optimizing shoulder surgery outcomes.  Patient referred by ortho to strengthen shoulder for anticipated laterjet/coracoid transfer stability surgery.    Primary movement impairment diagnosis of strength deficits resulting in pathoanatomical symptoms of Instability of left shoulder joint  Hill Sachs deformity, left and limiting his ability to go to work, lift, and wash behind the back.    Impairments include:  1) RTC strength  2) scapulothoracic strength  3)  Glenohumeral stability    Etiologic factors include repeated falling on out stretched arm placing shoulder into inferior instability and history of repeated motion stressing ligament laxity of glenohumeral region.    Discussed risks, benefits, and alternatives to treatment, and answered all patient questions to patient satisfaction.  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/difficulty   - Patient will be able to lift >15 pounds with proper mechanics        Plan  Patient would benefit from: skilled physical therapy  Planned modality interventions: cryotherapy, TENS, thermotherapy: hydrocollator packs and unattended electrical stimulation    Planned therapy interventions: abdominal trunk stabilization, behavior modification, body mechanics training, breathing training, flexibility, functional ROM exercises, home exercise program, joint mobilization, manual therapy, massage, Davidson taping, muscle pump exercises, neuromuscular re-education, patient education, postural training, strengthening, stretching, therapeutic activities, therapeutic exercise and therapeutic training    Frequency: 2x week  Duration in weeks: 8  Treatment plan discussed with: patient  Plan details: Prognosis above is given PT services 2x/week tapering to 1x/week over the next 2 months and home program adherence.        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  Hx of bankart repair with minimal improvement.  He comments that his instability is also in part to \"party trick\" activities where he would use his arms to jump rope.    PAIN LOCATION/DESCRIPTORS: side delt when in certain positions  AGGRAVATING FACTORS:  Fxn IR and 90 Abdcution, and empty can provoke symptoms  SPECIAL QUESTIONS:    Juanpablo denies a new onset of Tingling " "and Numbness.  PATIENT GOALS:  strengthen for the surgery  Pain  Current pain ratin  At best pain ratin  At worst pain ratin  Quality: grinding, pressure, sharp and tight  Aggravating factors: overhead activity        Objective     Active Range of Motion   Left Shoulder   Normal active range of motion    Right Shoulder   Normal active range of motion    Strength/Myotome Testing     Left Shoulder     Planes of Motion   Flexion: 3+   Extension: 3+   Abduction: 3+   Adduction: 3+   External rotation at 0°: 3+   External rotation at 45°: 3+   External rotation at 90°: 3+   Internal rotation at 0°: 4-     Isolated Muscles   Lower trapezius: 3   Middle trapezius: 4-     Right Shoulder     Planes of Motion   Flexion: 4   Adduction: 4   External rotation at 0°: 4   External rotation at 45°: 4   External rotation at 90°: 4-   Internal rotation at 0°: 4     Isolated Muscles   Lower trapezius: 4+   Middle trapezius: 4+     General Comments:      Shoulder Comments   Functional Rotation: Internal (T12) external (T4)    UQS (negative)    C/S ROM WFL - no incitation of of symptoms              POC Expires Auth Status Start Date Expiration Date PT Visit Limit           Date        Used        Remaining           Diagnosis:    Precautions:    Primary Goals:    *asterisks by exercise = given for HEP   Manuals             DO FOTO                                   There Ex        LT raises* X10       UT/LS stretch* 3x10\"       Rows* 3x10                                                       Neuro Re-Ed                                                                                                Patient education Diagnosis, prognosis, activity modification        Re-evaluation              Ther Act                                         Modalities                                                    "

## 2024-12-18 DIAGNOSIS — F17.200 SMOKING ADDICTION: Primary | ICD-10-CM

## 2024-12-18 RX ORDER — VARENICLINE TARTRATE 1 MG/1
1 TABLET, FILM COATED ORAL 2 TIMES DAILY
Qty: 60 TABLET | Refills: 1 | Status: SHIPPED | OUTPATIENT
Start: 2024-12-18

## 2024-12-18 NOTE — TELEPHONE ENCOUNTER
Patient called the RX Refill Line. Message is being forwarded to the office.     Patient is requesting Chantix, doesn't need the starter kit, just the maintenance doses. Has been using the Chantix prescribed from a provider out of network. I don't see this on his inactive med list. He would like it sent to [facility]. He stated he is having orthopedic surgery on his shoulder and his ortho wants him to stop smoking and he has not smoked in 2 weeks. He stated he will be getting a nicotene test on 12/23/24 at his ortho appt.     Please contact patient at  [phone]

## 2024-12-23 ENCOUNTER — APPOINTMENT (OUTPATIENT)
Dept: LAB | Facility: HOSPITAL | Age: 39
End: 2024-12-23
Attending: STUDENT IN AN ORGANIZED HEALTH CARE EDUCATION/TRAINING PROGRAM
Payer: COMMERCIAL

## 2024-12-23 ENCOUNTER — OFFICE VISIT (OUTPATIENT)
Dept: OBGYN CLINIC | Facility: CLINIC | Age: 39
End: 2024-12-23
Payer: COMMERCIAL

## 2024-12-23 ENCOUNTER — RESULTS FOLLOW-UP (OUTPATIENT)
Dept: FAMILY MEDICINE CLINIC | Facility: CLINIC | Age: 39
End: 2024-12-23

## 2024-12-23 ENCOUNTER — TELEPHONE (OUTPATIENT)
Age: 39
End: 2024-12-23

## 2024-12-23 VITALS — BODY MASS INDEX: 29.96 KG/M2 | WEIGHT: 214 LBS | HEIGHT: 71 IN

## 2024-12-23 DIAGNOSIS — S42.292A HILL SACHS DEFORMITY, LEFT: ICD-10-CM

## 2024-12-23 DIAGNOSIS — M24.412 RECURRENT ANTERIOR DISLOCATION OF LEFT SHOULDER: Primary | ICD-10-CM

## 2024-12-23 DIAGNOSIS — M25.312 INSTABILITY OF LEFT SHOULDER JOINT: ICD-10-CM

## 2024-12-23 DIAGNOSIS — M24.412 RECURRENT ANTERIOR DISLOCATION OF LEFT SHOULDER: ICD-10-CM

## 2024-12-23 PROBLEM — K56.609 SMALL BOWEL OBSTRUCTION (HCC): Status: RESOLVED | Noted: 2022-02-17 | Resolved: 2024-12-23

## 2024-12-23 PROBLEM — S43.015A ANTERIOR DISLOCATION OF LEFT SHOULDER: Status: ACTIVE | Noted: 2024-12-23

## 2024-12-23 PROBLEM — K56.609 SMALL BOWEL OBSTRUCTION (HCC): Status: ACTIVE | Noted: 2022-02-17

## 2024-12-23 LAB
ALBUMIN SERPL BCG-MCNC: 4.9 G/DL (ref 3.5–5)
ALP SERPL-CCNC: 51 U/L (ref 34–104)
ALT SERPL W P-5'-P-CCNC: 67 U/L (ref 7–52)
ANION GAP SERPL CALCULATED.3IONS-SCNC: 8 MMOL/L (ref 4–13)
AST SERPL W P-5'-P-CCNC: 32 U/L (ref 13–39)
BILIRUB SERPL-MCNC: 0.45 MG/DL (ref 0.2–1)
BUN SERPL-MCNC: 14 MG/DL (ref 5–25)
CALCIUM SERPL-MCNC: 10.3 MG/DL (ref 8.4–10.2)
CHLORIDE SERPL-SCNC: 102 MMOL/L (ref 96–108)
CO2 SERPL-SCNC: 28 MMOL/L (ref 21–32)
CREAT SERPL-MCNC: 0.79 MG/DL (ref 0.6–1.3)
ERYTHROCYTE [DISTWIDTH] IN BLOOD BY AUTOMATED COUNT: 12.7 % (ref 11.6–15.1)
GFR SERPL CREATININE-BSD FRML MDRD: 111 ML/MIN/1.73SQ M
GLUCOSE P FAST SERPL-MCNC: 80 MG/DL (ref 65–99)
HCT VFR BLD AUTO: 43.1 % (ref 36.5–49.3)
HGB BLD-MCNC: 14.7 G/DL (ref 12–17)
MCH RBC QN AUTO: 30.2 PG (ref 26.8–34.3)
MCHC RBC AUTO-ENTMCNC: 34.1 G/DL (ref 31.4–37.4)
MCV RBC AUTO: 89 FL (ref 82–98)
PLATELET # BLD AUTO: 344 THOUSANDS/UL (ref 149–390)
PMV BLD AUTO: 9.4 FL (ref 8.9–12.7)
POTASSIUM SERPL-SCNC: 4.1 MMOL/L (ref 3.5–5.3)
PROT SERPL-MCNC: 8.2 G/DL (ref 6.4–8.4)
RBC # BLD AUTO: 4.86 MILLION/UL (ref 3.88–5.62)
SODIUM SERPL-SCNC: 138 MMOL/L (ref 135–147)
WBC # BLD AUTO: 7.21 THOUSAND/UL (ref 4.31–10.16)

## 2024-12-23 PROCEDURE — 99214 OFFICE O/P EST MOD 30 MIN: CPT | Performed by: STUDENT IN AN ORGANIZED HEALTH CARE EDUCATION/TRAINING PROGRAM

## 2024-12-23 PROCEDURE — 36415 COLL VENOUS BLD VENIPUNCTURE: CPT

## 2024-12-23 PROCEDURE — 80323 ALKALOIDS NOS: CPT

## 2024-12-23 PROCEDURE — 80053 COMPREHEN METABOLIC PANEL: CPT

## 2024-12-23 PROCEDURE — 85027 COMPLETE CBC AUTOMATED: CPT

## 2024-12-23 RX ORDER — CHLORHEXIDINE GLUCONATE 40 MG/ML
SOLUTION TOPICAL DAILY PRN
OUTPATIENT
Start: 2024-12-23

## 2024-12-23 RX ORDER — CHLORHEXIDINE GLUCONATE ORAL RINSE 1.2 MG/ML
15 SOLUTION DENTAL ONCE
OUTPATIENT
Start: 2024-12-23 | End: 2024-12-23

## 2024-12-23 NOTE — PATIENT INSTRUCTIONS
BEFORE SURGERY    Stop all over the counter supplements, herbal, naturopathic  medications for 1 week prior to surgery UNLESS prescribed by your surgeon  Hold NSAIDS (i.e. advil, alleve, motrin, ibuprofen, celebrex) minimum 5 days prior to surgery  Follow presurgical medication instructions provided by preadmission nursing team reviewed during your presurgery phone call  Strategies for optimizing your surgery through breathing exercises, nutrition and physical activity can be found at [email or web address]  Call [phone] with any presurgical concerns or medications questions or use the messaging feature in your VERTILAS reanna to contact your provider  Hold lisinopril the morning of surgery    AFTER SURGERY    Recommend using Tylenol ( acetaminophen ) 1000 mg every eight hours during the first week post discharge along with icing the area for 20 mins every 3-4 hours while awake can be helpful in reducing your need for post operative opioid use. This opioid sparing plan can be used along side your surgeons pain plan.  Spend as much time out of bed as possible and allowed by your surgical team  Call [phone] with any post discharge concerns or medical issues or use the messaging feature in your VERTILAS reanna to contact your provider

## 2024-12-23 NOTE — PRE-PROCEDURE INSTRUCTIONS
Pre-Surgery Instructions:   Medication Instructions    escitalopram (LEXAPRO) 20 mg tablet Take day of surgery.    lisinopril (ZESTRIL) 20 mg tablet Hold day of surgery.    omeprazole (PriLOSEC) 40 MG capsule Take day of surgery.    varenicline (CHANTIX) 1 mg tablet Hold day of surgery.   Medication instructions for day surgery reviewed. Please use only a sip of water to take your instructed medications. Avoid all over the counter vitamins, supplements and NSAIDS for one week prior to surgery per anesthesia guidelines. Tylenol is ok to take as needed.     You will receive a call one business day prior to surgery with an arrival time and hospital directions. If your surgery is scheduled on a Monday, the hospital will be calling you on the Friday prior to your surgery. If you have not heard from anyone by 8pm, please call the hospital supervisor through the hospital  at [phone]. ([place] [phone] or [place] [phone]).    Do not eat or drink anything after midnight the night before your surgery, including candy, mints, lifesavers, or chewing gum. Do not drink alcohol 24hrs before your surgery. Try not to smoke at least 24hrs before your surgery.       Follow the pre surgery showering instructions as listed in the “My Surgical Experience Booklet” or otherwise provided by your surgeon's office. Do not use a blade to shave the surgical area 1 week before surgery. It is okay to use a clean electric clippers up to 24 hours before surgery. Do not apply any lotions, creams, including makeup, cologne, deodorant, or perfumes after showering on the day of your surgery. Do not use dry shampoo, hair spray, hair gel, or any type of hair products.     No contact lenses, eye make-up, or artificial eyelashes. Remove nail polish, including gel polish, and any artificial, gel, or acrylic nails if possible. Remove all jewelry including rings and body piercing jewelry.     Wear causal clothing that is easy to take  on and off. Consider your type of surgery.    Keep any valuables, jewelry, piercings at home. Please bring any specially ordered equipment (sling, braces) if indicated.    Arrange for a responsible person to drive you to and from the hospital on the day of your surgery. Please confirm the visitor policy for the day of your procedure when you receive your phone call with an arrival time.     Call the surgeon's office with any new illnesses, exposures, or additional questions prior to surgery.    Please reference your “My Surgical Experience Booklet” for additional information to prepare for your upcoming surgery.

## 2024-12-23 NOTE — ASSESSMENT & PLAN NOTE
Stable  Monitor post operative BP   Avoid hypotension if at all possible  Hold lisinopril the morning of surgery

## 2024-12-23 NOTE — PROGRESS NOTES
Internal Medicine Pre-Operative Evaluation:     Reason for Visit: Pre-operative Evaluation for Risk Stratification and Optimization    Patient ID: Juanpablo Amaya Jr. is a 39 y.o. male.     Case: [id number] Date/Time: 12/26/24 1240   Procedures:      LATARJET SHOULDER RECONSTRUCTION (Left: Shoulder)      ARTHROSCOPY SHOULDER (Left: Shoulder)   Anesthesia type: General w/ Regional   Diagnosis: Recurrent anterior dislocation of left shoulder [M24.412]   Pre-op diagnosis: Recurrent anterior dislocation of left shoulder [M24.412]   Location:  OR ROOM 01 / [facility]   Surgeons: Reij Perez MD         Recommendations to Proceed withSurgery    Patient is considered to be Low risk for Medium risk procedure.     After evaluation and discussion with patient with emphasis that all surgery has some degree of inherent risk it is acknowledged by patient this risk is Acceptable.    Patient is optimized and may proceed with planned procedure. He has nicotine testing required by his surgeon which he will complete after his visit.      Assessment    Pre-operative Medical Evaluation for planned surgery  Recommendations as listed in PLAN section below  Contact surgical nurse  navigator with any questions regarding preoperative plan or schedule.      Assessment & Plan  Preoperative clearance    Anterior dislocation of left shoulder, subsequent encounter  For surgical intervention as above  Primary hypertension  Stable  Monitor post operative BP   Avoid hypotension if at all possible  Hold lisinopril the morning of surgery    Cigarette nicotine dependence without complication  Currently using chantix  Quit 2 weeks  Will go for nicotine test  Instability of left shoulder joint             Plan:     1. Further preoperative workup as follows:   - needs nicotine test    2. Preoperative Medication Management Review performed by PAT nursing  NO    3. Patient requires further consultation with:   No Consults  Required    4. Discharge Planning / Barriers to Discharge  none identified - patients has post discharge therapy plan in place, transportation arranged for discharge day, adequate family support at home to assist with discharge to home.        Subjective:           History of Present Illness:     Juanpablo Amaya Jr. is a 39 y.o. male who presents to the office today for a preoperative consultation at the request of surgeon. The patient understands this is an elective procedure and not emergent. They are electing to undergo planned procedure with an understanding that all surgery has inherent risk. They have worked with their surgeon and failed conservative treatment measures. Today they present for preoperative risk assessment and recommendations for optimization in preparation for surgery.    Pt seen in surgical optimization center for upcoming proposed surgery. They have failed previous conservative measures and have elected surgical intervention.     Pt meets presurgical lab parameters. I reviewed his medical history. He is currently homeless but says he will have help post operatively and he believes that they will likely lock his narcotics for him. He recently quit smoking using chantix, he is also using a vape but according to the box there is no nicotine in the product. I did recommend he wean the vape as well as this is harmful to his lungs. He completed his blood nicotine test and is going to go for his urine test after our visit today.       Juanpablo Amaya Jr. has an IN HOSPITAL cardiac risk of RCI RISK CLASS I (0 risk factors, risk of major cardiac compl. appr. 0.5%) based on RCRI calculator    Cardiac Risk Estimation: per the Revised Cardiac Risk Index (Circ. 100:1043, 1999),         Pre-op Exam    Previous history of bleeding disorders or clots?: No  Previous Anesthesia reaction?: No  Prolonged steroid use in the last 6 months?: No    Assessment of Cardiac Risk:   - Unstable or severe angina or MI in  "the last 6 weeks or history of stent placement in the last year?: No   - Decompensated heart failure (e.g. New onset heart failure, NYHA  Class IV heart failure, or worsening existing heart failure)?: No  - Significant arrhythmias such as high grade AV block, symptomatic ventricular arrhythmia, newly recognized ventricular tachycardia, supraventricular tachycardia with resting heart rate >100, or symptomatic bradycardia?: No  - Severe heart valve disease including aortic stenosis or symptomatic mitral stenosis?: No      Pre-operative Risk Factors:  Elevated-risk surgery: No    History of cerebrovascular disease: No    History of ischemic heart disease: No  Pre-operative treatment with insulin: No  Pre-operative creatinine >2 mg/dL: No    History of congestive heart failure: No    [facility] Activity Status Index (DASI):   DASI Total Score: 23.45  METs: 5.6        ROS: No TIA's or unusual headaches, no dysphagia.  No prolonged cough. No dyspnea or chest pain on exertion.  No abdominal pain, change in bowel habits, black or bloody stools.  No urinary tract or BPH symptoms.  Positive reported pain in arthritic joint. Positive difficulty with gait. No skin rashes or issues.      Objective:    /89   Pulse 103   Ht 5' 11.5\" (1.816 m)   Wt 109 kg (240 lb 3.2 oz)   SpO2 97%   BMI 33.03 kg/m²       General Appearance: no distress, conversive  HEENT: PERRLA, conjuctiva normal; oropharynx clear; mucous membranes moist;   Neck:  Supple, no lymphadenopathy or thyromegaly  Lungs: breath sounds normal, normal respiratory effort, no retractions, expiratory effort normal  CV: normal heart sounds S1/S2, PMI normal   ABD: soft non tender, no masses , no hepatic or splenomegaly  EXT: DP pulses intact, no lymphadenopathy, no edema  Skin: normal turgor, normal texture, no rash  Psych: affect normal, mood normal  Neuro: AAOx3        The following portions of the patient's history were reviewed and updated as appropriate: allergies, " "current medications, past family history, past medical history, past social history, past surgical history and problem list.     Past History:       Past Medical History:   Diagnosis Date    Anxiety     Depression     GERD (gastroesophageal reflux disease)     Hypertension     Kidney stone     Seizures (HCC)     -drug related; also age 3 unknown cause    Small bowel obstruction (HCC) 2022    Past Surgical History:   Procedure Laterality Date    MULTIPLE TOOTH EXTRACTIONS      SHOULDER SURGERY Left           Social History     Tobacco Use    Smoking status: Former     Current packs/day: 0.00     Average packs/day: 0.5 packs/day for 29.9 years (15.0 ttl pk-yrs)     Types: Cigarettes     Start date:      Quit date: 2024     Years since quittin.0    Smokeless tobacco: Never    Tobacco comments:     On chantix   Vaping Use    Vaping status: Some Days    Substances: Flavoring   Substance Use Topics    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     Family History   Problem Relation Age of Onset    Hypertension Mother     Hypertension Father           Allergies:     No Known Allergies     Current Medications:     Current Outpatient Medications   Medication Instructions    escitalopram (LEXAPRO) 20 mg, Oral, Daily    lisinopril (ZESTRIL) 20 mg, Oral, Daily    naproxen (NAPROSYN) 500 mg, Oral, 2 times daily with meals    omeprazole (PRILOSEC) 40 mg, Oral, Daily before breakfast    varenicline (CHANTIX) 1 mg, Oral, 2 times daily           PRE-OP WORKSHEET DATA    Assessment of Pre-Operative Risks     MLJ Quality Hard Stops:    BMI (<40) : Estimated body mass index is 33.03 kg/m² as calculated from the following:    Height as of this encounter: 5' 11.5\" (1.816 m).    Weight as of this encounter: 109 kg (240 lb 3.2 oz).    Hgb ( >11):   Lab Results   Component Value Date    HGB 14.7 2024    HGB 14.8 2024    HGB 14.7 2022 " "      HbA1c (<7.5) : No results found for: \"HGBA1C\"    GFR (>60) (Less then 45 = Nephrology consult):    Lab Results   Component Value Date    EGFR 111 12/23/2024    EGFR 109 08/21/2024    EGFR 100 09/18/2022            Pre-Op Data Reviewed:       Laboratory Results: I have personally reviewed the pertinent laboratory results/reports     EKG:I have personally reviewed pertinent reports.  . I personally reviewed and interpreted available tracings in the electronic medical record    None required    OLD RECORDS: reviewed old records in the chart review section if EHR on day of visit.    Previous cardiopulmonary studies within the past year:  Echocardiogram: no   Cardiac Catheterization: no  Stress Test: no      Time of visit including pre-visit chart review, visit and post-visit coordination of plan and care , review of pre-surgical lab work, preparation and time spent documenting note in electronic medical record, time spent face-to-face in physical examination answering patient questions by care team 35 minutes             [facility]    "

## 2024-12-23 NOTE — TELEPHONE ENCOUNTER
Caller: Patient    Doctor: Chris    Reason for call: Request cancel sx. Thought it was scheduled for 12/26. Please cb discuss sx w/the patient    Call back#: [phone]

## 2024-12-24 ENCOUNTER — OFFICE VISIT (OUTPATIENT)
Age: 39
End: 2024-12-24
Payer: COMMERCIAL

## 2024-12-24 ENCOUNTER — APPOINTMENT (OUTPATIENT)
Dept: LAB | Facility: HOSPITAL | Age: 39
End: 2024-12-24
Attending: STUDENT IN AN ORGANIZED HEALTH CARE EDUCATION/TRAINING PROGRAM
Payer: COMMERCIAL

## 2024-12-24 VITALS
DIASTOLIC BLOOD PRESSURE: 89 MMHG | OXYGEN SATURATION: 97 % | HEART RATE: 103 BPM | HEIGHT: 72 IN | SYSTOLIC BLOOD PRESSURE: 135 MMHG | BODY MASS INDEX: 32.53 KG/M2 | WEIGHT: 240.2 LBS

## 2024-12-24 DIAGNOSIS — R79.89 ELEVATED LIVER FUNCTION TESTS: Primary | ICD-10-CM

## 2024-12-24 DIAGNOSIS — M24.412 RECURRENT ANTERIOR DISLOCATION OF LEFT SHOULDER: ICD-10-CM

## 2024-12-24 DIAGNOSIS — M25.312 INSTABILITY OF LEFT SHOULDER JOINT: ICD-10-CM

## 2024-12-24 DIAGNOSIS — S43.015D ANTERIOR DISLOCATION OF LEFT SHOULDER, SUBSEQUENT ENCOUNTER: ICD-10-CM

## 2024-12-24 DIAGNOSIS — Z01.818 PREOPERATIVE CLEARANCE: Primary | ICD-10-CM

## 2024-12-24 DIAGNOSIS — I10 PRIMARY HYPERTENSION: ICD-10-CM

## 2024-12-24 DIAGNOSIS — F17.210 CIGARETTE NICOTINE DEPENDENCE WITHOUT COMPLICATION: ICD-10-CM

## 2024-12-24 PROCEDURE — 99213 OFFICE O/P EST LOW 20 MIN: CPT | Performed by: NURSE PRACTITIONER

## 2024-12-24 PROCEDURE — 80307 DRUG TEST PRSMV CHEM ANLYZR: CPT

## 2024-12-24 NOTE — H&P (VIEW-ONLY)
Ortho Sports Medicine Shoulder Follow Up Visit     Assesment:   39 y.o. male left shoulder distant with current anterior shoulder instability with significant glenoid bone loss measuring approximately 20% the setting of prior anterior labral repair/capsulorrhaphy/Bankart repair.    Plan:    I had a detailed discussion with Juanpablo regarding his left shoulder. Juanpablo presents with a challenging clinical scenario. 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 His history is notable for history of tobacco use, although he has now quit smoking for 3 weeks and has been on Chantix. His social situation is also notable for being shelter domiciled. The treatment options at this stage are:    1. Continue with the non surgical management consisting of PT, anti inflammatory medications, and intermittent corticosteroid injection.  2. Surgical options:     A. Revision anterior labral / Bankart repair, either open or arthroscopic, with capsulorraphy and Remplissage   B. Coracoid transfer procedure / Latarjet with capsulorraphy    C. Distal tibia allograft augmentation procedure for anterior glenoid reconstruction     With option #1 there is future risk of of dislocation arthropathy, progression of anterior glenoid / humeral bone loss, and recurrent instability in the affected shoulder, all of which can result in significant dysfunction of shoulder and patient does not want this option. His work involves a lot of labor and overhead motion with [employer], and he has had continued instability despite several years of attempting to strengthen his shoulder and avoid positions that place his shoulder at risk for dislocation.    Although option #2A can be performed, he does have significant anterior glenoid bone loss, and based on his glenoid track calculations, his Hill-Sachs lesion does appear to be off-track, indicating higher risk of recurrent instability with  repeat Bankart repair even with Remplissage. Nevertheless, I did discuss this with Juanpablo as an option. While his older age is a positive prognostic factor, his bone loss and significant overhead activities place him at higher risk for recurrence with isolated soft tissue procedure.     I also discussed options 2B and 2C with the patient. Based on his coracoid measurements and amount of bone loss, he is a suitable candidate for a coracoid transfer which would be enough to replace his glenoid bone loss and make his Hill-sachs lesion on-track without additional Remplissage or DTA augmentation.    The proposed procedure will be performed as an outpatient procedure. I discussed in detail with Juanpablo the indications for this surgery, the technical aspects of surgery, the risks, benefits and alternatives to the proposed surgical procedure. The typical range of motion achieved with a Latarjet was shown to the patient and procedure was explained using bone models and drawings. I did discuss that he would likely lose some of his max abduction / external rotation, however that this would be protective of recurrent dislocation.    I did discuss with Juanpablo that this was a major surgery and that any nicotine use placed him at increased risk for complications including non-healing of the coracoid graft, hardware breakage, wound complications, infection, and recurrent instability. He states he has not used any nicotine products for 3 weeks and has successfully quit, and I commended him on this. We will obtain lab work to confirm this today, and he understands that if any of this comes back positive, I would not move forward with surgical intervention until he had quit nicotine. He expressed strong commitment to his recovery and continuing to abstain from any nicotine post-operatively.    I also explained to Juanpablo that optimal surgical results were also dependent on months of post-operative physical therapy, and that I  would not recommend surgery unless he had the funds and means to attend PT post-operatively. He did attend PT as I recommend pre-operatively and demonstrated he was able to attend PT, and he states he has saved up money for his post-op PT and that he set aside money for the required co-pay for his PT. In summary, I believe Juanpablo has demonstrated a commitment to his recovery and rehabilitation. His current symptoms are preventing his ability to work and make a living, and I do feel that surgical intervention and shoulder stabilization will help improve his function and quality of life.    After a detailed discussion with the patient and based on the disabling symptoms and physical exam findings, we mutually decided to proceed with left shoulder arthroscopy and Latarjet / coracoid transfer with anterior capsulorraphy. We discussed that the rationale of the shoulder arthroscopy was to both confirm the amount of glenoid bone loss and address any intra-articular pathologies such as a SLAP tear that could not be accessed easily with an open anterior approach.  However, I told the patient that shoulder instability is not a life or limb threatening condition and is not a heart disease or cancer and a final decision does not need to be made today. Surgery is indicated for preventing dislocation and improvement of quality of life and shoulder function.     We also discussed about the management of postoperative pain. We discussed about the indications and side effects of medications that will be used to control pain (NSAIDs, Tylenol and opioids) and other medications (stool softener, antinausea and antigastritis medications) that will be prescribed postoperatively. I also told the patient that I don't prescribe long term opioid medications. However, we will do everything in our capacity to manage postoperative pain. Patient understands that opioids have to be used sparingly and judiciously and not as first line treatment  for pain. Patient was made aware of the disadvantages of opioid medications and their potential for addiction and dependence. NSAIDs can have adverse effect on kidneys, heart, liver, stomach and other organs and excessive or prolonged use of these medications is discouraged. We discussed how side effects from use of these medications can affect outcomes after the surgery.     We also discussed the typical benefits and outcomes after this surgery. Patient was made aware that this procedure can be associated with complications including but not limited to death, infection, failure of the coracoid graft to heal, failure of subscapularis repair, recurrent dislocation and instability, development of arthritis from coracoid bone block, axillary or musculocutaneous nerve injury, vascular injury, avascular necrosis of the humeral head, hardware / screw breakage, hematoma, intraoperative fracture, failure of operation, post operative stiffness, surgical scar including keloid and hypertrophic scar, neck pain and stiffness  need for future revision surgery, persistent shoulder pain and postoperative blood clot. We did discuss the management of these complications if they unfortunately happen. I also discussed the possible risk for future arthritis.    The postoperative rehabilitation and its average duration, pain medication requirements in the immediate postoperative period, time spent in the sling, average time to return to work, driving restrictions in the postoperative period, and the time for maximum medical improvement were discussed in detail.     The patient verbalized the plan back to me in this shared decision making and understands that He can change his mind at anytime.All questions were answered in detail. Patient wants to proceed with the surgery and gave informed consent. We will make necessary arrangements for the surgical scheduling. We will obtain pre-operative labwork including nicotine testing and the  patient will receive formal medical clearance.      Follow up:    Follow-up after surgery for wound check and x-rays (AP, Grashey, Scap Y, and axillary)      Chief Complaint   Patient presents with    Left Shoulder - Follow-up         History of Present Illness:    The patient is returns for follow up of left shoulder pain.  Since prior visit patient has commenced physical therapy of the left shoulder and reports improvement of range of motion and strength of shoulder, but he has had continued dislocation episodes. These typically have happened with overhead abduction and external rotation motions. With regards to his tobacco use, he has not used any nicotine in 3 weeks and has been taking Chantix. He uses a vape which does not have nicotine. As noted at prior visit, he works at [employer] and lifts [employer], however his work has been understanding about his shoulder condition and are holding his job until he is able to return once his shoulder function is improved.  The symptoms have been limiting his ability to work and he is currently off from work until he can get clearance to return.  He is currently living in a shelter and the shelter has also been understanding with his situation, and he states he feels comfortable recovering from surgery at his shelter.  He is frustrated by his continued pain and instability and the impact his symptoms are having on his life and work, and he is interested in moving forward with surgical treatment given his continued symptoms despite several months of home exercises and activity modification. He does state that he continues to be seizure free and has not had a seizure in several years.      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; also age 3 unknown cause    Small bowel obstruction (HCC) 02/17/2022      Past Surgical History:   Procedure Laterality Date    MULTIPLE TOOTH EXTRACTIONS      SHOULDER SURGERY Left      No Known Allergies  Current Outpatient Medications on File Prior to Visit   Medication Sig Dispense Refill    escitalopram (LEXAPRO) 20 mg tablet TAKE 1 TABLET BY MOUTH EVERY DAY 90 tablet 1    lisinopril (ZESTRIL) 20 mg tablet TAKE 1 TABLET BY MOUTH EVERY DAY 30 tablet 3    naproxen (Naprosyn) 500 mg tablet Take 1 tablet (500 mg total) by mouth 2 (two) times a day with meals 30 tablet 0    omeprazole (PriLOSEC) 40 MG capsule TAKE 1 CAPSULE BY MOUTH EVERY DAY BEFORE BREAKFAST 30 capsule 3    varenicline (CHANTIX) 1 mg tablet Take 1 tablet (1 mg total) by mouth 2 (two) times a day 60 tablet 1     No current facility-administered medications on file prior to visit.     Social History     Socioeconomic History    Marital status: Single     Spouse name: Not on file    Number of children: Not on file    Years of education: Not on file    Highest education level: Not on file   Occupational History    Not on file   Tobacco Use    Smoking status: Former     Current packs/day: 0.00     Average packs/day: 0.5 packs/day for 29.9 years (15.0 ttl pk-yrs)     Types: Cigarettes     Start date:      Quit date: 2024     Years since quittin.0    Smokeless tobacco: Never    Tobacco comments:     On chantix   Vaping Use    Vaping status: Some Days    Substances: Flavoring   Substance and Sexual Activity    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    Review of systems: ROS is negative other than that noted in the HPI.  Constitutional: Negative for fatigue and fever.      Physical Exam:    Height 5' 11\" (1.803 m), weight 97.1 kg (214 lb).    General/Constitutional: NAD, well developed, well nourished  HENT: Normocephalic, atraumatic  CV: Intact distal pulses, regular rate  Resp: No respiratory distress or labored breathing  GI: Soft and non-tender   Lymphatic: No lymphadenopathy palpated  Neuro: Alert and Oriented x 3, no focal deficits  Psych: Normal mood, normal affect, normal judgement, normal behavior  Skin: Warm, dry, no rashes, no erythema      Shoulder focused exam:       RIGHT LEFT    Scapula Atrophy Negative Negative     Winging Negative Negative     Protraction Negative Negative    Rotator cuff SS 5/5 5/5     IS 5/5 5/5     SubS 5/5 5/5    ROM     170     ER0 60 60     ER90 90    85     IR90 80    70     IRb L1    L1    TTP: AC Negative Negative     Biceps Negative Negative     Coracoid Negative Negative    Special Tests: O'Briens Negative Negative     Cross body Adduction Negative Negative     Speeds  Negative Negative     Vibha's Negative Negative     Neer Negative Negative     Wilder Negative Negative    Instability: Apprehension & relocation negative Positive apprehension and relocation test       Load & shift negative Positive  3+ anterior load and shift  1+ posterior load and shift       Negative sulcus sign      Left shoulder: Well healed arthroscopy incisions, no erythema induration or fluctuance    UE NV Exam: +2 Radial pulses bilaterally  Sensation intact to light touch C5-T1 bilaterally, Radial/median/ulnar nerve motor intact    Cervical ROM is full without pain, numbness or tingling      Shoulder Imaging    I personally reviewed and interpreted CT of the left shoulder obtained on 12/10/24 which was " reviewed in detail with the patient. This demonstrates anterior glenoid bone loss as measured below, approximately 20.9%, along with Hill-sachs defect. Tracts from prior suture anchors from prior arthroscopic labral repair are visualized. Coracoid is measured to be 12 mm in width.    Glenoid Track calculation:  GT = 0.83D-d = (0.83*70.95) -23.78 = 35.10  HS = 38.30 pixels  Consistent with Off Track lesion  70          GT measurements based on standard CT  GT = (.83*3.06)-0.64 = 1.89  HS = 2.10 CM  Glenoid Bone loss = 0.64 /3.06 cm= 20.9 %    Coracoid width = 12 mm - wide enough to replace glenoid bone loss which is approximately 6.4 mm            I also again reviewed with the patient the 4 radiographs of the left shoulder taken the office on 10/14/2024 including Grashey, AP internal rotation, scapular Y and axillary lateral which demonstrate reduced glenohumeral joint.  There are no significant degenerative changes.  On the axillary view, there is some irregularity in the contour of the anterior glenoid suggestive of possible glenoid bone loss.  These were reviewed in detail with the patient.  I reviewed the radiology report and agree with their findings.       I also again reviewed with Juanpablo the MRI of the left shoulder obtained on 10/22/2024 which was reviewed in detail with the patient.  This demonstrates recurrent anterior labral tear with anterior glenoid bone loss, approximately 20%.  There is a small shallow Hill-Sachs lesion.  Mild tendinosis in the supraspinatus with no complete tear.  There are changes in the anterior glenoid from prior labral repair. Some bony edema in the posterior superior humeral head, possibly from from recent dislocation episode. Small separation between superior labrum and glenoid consistent with possible type 2 SLAP tear, likely degenerative in nature.

## 2024-12-24 NOTE — TELEPHONE ENCOUNTER
----- Message from Chris Martinez MD sent at 12/23/2024  1:28 PM EST -----  Call patient. ALT a little high. Recheck LFTs in 1 mth

## 2024-12-24 NOTE — PROGRESS NOTES
Ortho Sports Medicine Shoulder Follow Up Visit     Assesment:   39 y.o. male left shoulder distant with current anterior shoulder instability with significant glenoid bone loss measuring approximately 20% the setting of prior anterior labral repair/capsulorrhaphy/Bankart repair.    Plan:    I had a detailed discussion with Juanpablo regarding his left shoulder. Juanpablo presents with a challenging clinical scenario. 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 His history is notable for history of tobacco use, although he has now quit smoking for 3 weeks and has been on Chantix. His social situation is also notable for being shelter domiciled. The treatment options at this stage are:    1. Continue with the non surgical management consisting of PT, anti inflammatory medications, and intermittent corticosteroid injection.  2. Surgical options:     A. Revision anterior labral / Bankart repair, either open or arthroscopic, with capsulorraphy and Remplissage   B. Coracoid transfer procedure / Latarjet with capsulorraphy    C. Distal tibia allograft augmentation procedure for anterior glenoid reconstruction     With option #1 there is future risk of of dislocation arthropathy, progression of anterior glenoid / humeral bone loss, and recurrent instability in the affected shoulder, all of which can result in significant dysfunction of shoulder and patient does not want this option. His work involves a lot of labor and overhead motion with [employer], and he has had continued instability despite several years of attempting to strengthen his shoulder and avoid positions that place his shoulder at risk for dislocation.    Although option #2A can be performed, he does have significant anterior glenoid bone loss, and based on his glenoid track calculations, his Hill-Sachs lesion does appear to be off-track, indicating higher risk of recurrent instability with  repeat Bankart repair even with Remplissage. Nevertheless, I did discuss this with Juanpablo as an option. While his older age is a positive prognostic factor, his bone loss and significant overhead activities place him at higher risk for recurrence with isolated soft tissue procedure.     I also discussed options 2B and 2C with the patient. Based on his coracoid measurements and amount of bone loss, he is a suitable candidate for a coracoid transfer which would be enough to replace his glenoid bone loss and make his Hill-sachs lesion on-track without additional Remplissage or DTA augmentation.    The proposed procedure will be performed as an outpatient procedure. I discussed in detail with Juanpablo the indications for this surgery, the technical aspects of surgery, the risks, benefits and alternatives to the proposed surgical procedure. The typical range of motion achieved with a Latarjet was shown to the patient and procedure was explained using bone models and drawings. I did discuss that he would likely lose some of his max abduction / external rotation, however that this would be protective of recurrent dislocation.    I did discuss with Juanpablo that this was a major surgery and that any nicotine use placed him at increased risk for complications including non-healing of the coracoid graft, hardware breakage, wound complications, infection, and recurrent instability. He states he has not used any nicotine products for 3 weeks and has successfully quit, and I commended him on this. We will obtain lab work to confirm this today, and he understands that if any of this comes back positive, I would not move forward with surgical intervention until he had quit nicotine. He expressed strong commitment to his recovery and continuing to abstain from any nicotine post-operatively.    I also explained to Juanpablo that optimal surgical results were also dependent on months of post-operative physical therapy, and that I  would not recommend surgery unless he had the funds and means to attend PT post-operatively. He did attend PT as I recommend pre-operatively and demonstrated he was able to attend PT, and he states he has saved up money for his post-op PT and that he set aside money for the required co-pay for his PT. In summary, I believe Juanpablo has demonstrated a commitment to his recovery and rehabilitation. His current symptoms are preventing his ability to work and make a living, and I do feel that surgical intervention and shoulder stabilization will help improve his function and quality of life.    After a detailed discussion with the patient and based on the disabling symptoms and physical exam findings, we mutually decided to proceed with left shoulder arthroscopy and Latarjet / coracoid transfer with anterior capsulorraphy. We discussed that the rationale of the shoulder arthroscopy was to both confirm the amount of glenoid bone loss and address any intra-articular pathologies such as a SLAP tear that could not be accessed easily with an open anterior approach.  However, I told the patient that shoulder instability is not a life or limb threatening condition and is not a heart disease or cancer and a final decision does not need to be made today. Surgery is indicated for preventing dislocation and improvement of quality of life and shoulder function.     We also discussed about the management of postoperative pain. We discussed about the indications and side effects of medications that will be used to control pain (NSAIDs, Tylenol and opioids) and other medications (stool softener, antinausea and antigastritis medications) that will be prescribed postoperatively. I also told the patient that I don't prescribe long term opioid medications. However, we will do everything in our capacity to manage postoperative pain. Patient understands that opioids have to be used sparingly and judiciously and not as first line treatment  for pain. Patient was made aware of the disadvantages of opioid medications and their potential for addiction and dependence. NSAIDs can have adverse effect on kidneys, heart, liver, stomach and other organs and excessive or prolonged use of these medications is discouraged. We discussed how side effects from use of these medications can affect outcomes after the surgery.     We also discussed the typical benefits and outcomes after this surgery. Patient was made aware that this procedure can be associated with complications including but not limited to death, infection, failure of the coracoid graft to heal, failure of subscapularis repair, recurrent dislocation and instability, development of arthritis from coracoid bone block, axillary or musculocutaneous nerve injury, vascular injury, avascular necrosis of the humeral head, hardware / screw breakage, hematoma, intraoperative fracture, failure of operation, post operative stiffness, surgical scar including keloid and hypertrophic scar, neck pain and stiffness  need for future revision surgery, persistent shoulder pain and postoperative blood clot. We did discuss the management of these complications if they unfortunately happen. I also discussed the possible risk for future arthritis.    The postoperative rehabilitation and its average duration, pain medication requirements in the immediate postoperative period, time spent in the sling, average time to return to work, driving restrictions in the postoperative period, and the time for maximum medical improvement were discussed in detail.     The patient verbalized the plan back to me in this shared decision making and understands that He can change his mind at anytime.All questions were answered in detail. Patient wants to proceed with the surgery and gave informed consent. We will make necessary arrangements for the surgical scheduling. We will obtain pre-operative labwork including nicotine testing and the  patient will receive formal medical clearance.      Follow up:    Follow-up after surgery for wound check and x-rays (AP, Grashey, Scap Y, and axillary)      Chief Complaint   Patient presents with    Left Shoulder - Follow-up         History of Present Illness:    The patient is returns for follow up of left shoulder pain.  Since prior visit patient has commenced physical therapy of the left shoulder and reports improvement of range of motion and strength of shoulder, but he has had continued dislocation episodes. These typically have happened with overhead abduction and external rotation motions. With regards to his tobacco use, he has not used any nicotine in 3 weeks and has been taking Chantix. He uses a vape which does not have nicotine. As noted at prior visit, he works at [employer] and lifts [employer], however his work has been understanding about his shoulder condition and are holding his job until he is able to return once his shoulder function is improved.  The symptoms have been limiting his ability to work and he is currently off from work until he can get clearance to return.  He is currently living in a shelter and the shelter has also been understanding with his situation, and he states he feels comfortable recovering from surgery at his shelter.  He is frustrated by his continued pain and instability and the impact his symptoms are having on his life and work, and he is interested in moving forward with surgical treatment given his continued symptoms despite several months of home exercises and activity modification. He does state that he continues to be seizure free and has not had a seizure in several years.      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; also age 3 unknown cause    Small bowel obstruction (HCC) 02/17/2022      Past Surgical History:   Procedure Laterality Date    MULTIPLE TOOTH EXTRACTIONS      SHOULDER SURGERY Left      No Known Allergies  Current Outpatient Medications on File Prior to Visit   Medication Sig Dispense Refill    escitalopram (LEXAPRO) 20 mg tablet TAKE 1 TABLET BY MOUTH EVERY DAY 90 tablet 1    lisinopril (ZESTRIL) 20 mg tablet TAKE 1 TABLET BY MOUTH EVERY DAY 30 tablet 3    naproxen (Naprosyn) 500 mg tablet Take 1 tablet (500 mg total) by mouth 2 (two) times a day with meals 30 tablet 0    omeprazole (PriLOSEC) 40 MG capsule TAKE 1 CAPSULE BY MOUTH EVERY DAY BEFORE BREAKFAST 30 capsule 3    varenicline (CHANTIX) 1 mg tablet Take 1 tablet (1 mg total) by mouth 2 (two) times a day 60 tablet 1     No current facility-administered medications on file prior to visit.     Social History     Socioeconomic History    Marital status: Single     Spouse name: Not on file    Number of children: Not on file    Years of education: Not on file    Highest education level: Not on file   Occupational History    Not on file   Tobacco Use    Smoking status: Former     Current packs/day: 0.00     Average packs/day: 0.5 packs/day for 29.9 years (15.0 ttl pk-yrs)     Types: Cigarettes     Start date:      Quit date: 2024     Years since quittin.0    Smokeless tobacco: Never    Tobacco comments:     On chantix   Vaping Use    Vaping status: Some Days    Substances: Flavoring   Substance and Sexual Activity    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    Review of systems: ROS is negative other than that noted in the HPI.  Constitutional: Negative for fatigue and fever.      Physical Exam:    Height 5' 11\" (1.803 m), weight 97.1 kg (214 lb).    General/Constitutional: NAD, well developed, well nourished  HENT: Normocephalic, atraumatic  CV: Intact distal pulses, regular rate  Resp: No respiratory distress or labored breathing  GI: Soft and non-tender   Lymphatic: No lymphadenopathy palpated  Neuro: Alert and Oriented x 3, no focal deficits  Psych: Normal mood, normal affect, normal judgement, normal behavior  Skin: Warm, dry, no rashes, no erythema      Shoulder focused exam:       RIGHT LEFT    Scapula Atrophy Negative Negative     Winging Negative Negative     Protraction Negative Negative    Rotator cuff SS 5/5 5/5     IS 5/5 5/5     SubS 5/5 5/5    ROM     170     ER0 60 60     ER90 90    85     IR90 80    70     IRb L1    L1    TTP: AC Negative Negative     Biceps Negative Negative     Coracoid Negative Negative    Special Tests: O'Briens Negative Negative     Cross body Adduction Negative Negative     Speeds  Negative Negative     Vibha's Negative Negative     Neer Negative Negative     Wilder Negative Negative    Instability: Apprehension & relocation negative Positive apprehension and relocation test       Load & shift negative Positive  3+ anterior load and shift  1+ posterior load and shift       Negative sulcus sign      Left shoulder: Well healed arthroscopy incisions, no erythema induration or fluctuance    UE NV Exam: +2 Radial pulses bilaterally  Sensation intact to light touch C5-T1 bilaterally, Radial/median/ulnar nerve motor intact    Cervical ROM is full without pain, numbness or tingling      Shoulder Imaging    I personally reviewed and interpreted CT of the left shoulder obtained on 12/10/24 which was " reviewed in detail with the patient. This demonstrates anterior glenoid bone loss as measured below, approximately 20.9%, along with Hill-sachs defect. Tracts from prior suture anchors from prior arthroscopic labral repair are visualized. Coracoid is measured to be 12 mm in width.    Glenoid Track calculation:  GT = 0.83D-d = (0.83*70.95) -23.78 = 35.10  HS = 38.30 pixels  Consistent with Off Track lesion  70          GT measurements based on standard CT  GT = (.83*3.06)-0.64 = 1.89  HS = 2.10 CM  Glenoid Bone loss = 0.64 /3.06 cm= 20.9 %    Coracoid width = 12 mm - wide enough to replace glenoid bone loss which is approximately 6.4 mm            I also again reviewed with the patient the 4 radiographs of the left shoulder taken the office on 10/14/2024 including Grashey, AP internal rotation, scapular Y and axillary lateral which demonstrate reduced glenohumeral joint.  There are no significant degenerative changes.  On the axillary view, there is some irregularity in the contour of the anterior glenoid suggestive of possible glenoid bone loss.  These were reviewed in detail with the patient.  I reviewed the radiology report and agree with their findings.       I also again reviewed with Juanpablo the MRI of the left shoulder obtained on 10/22/2024 which was reviewed in detail with the patient.  This demonstrates recurrent anterior labral tear with anterior glenoid bone loss, approximately 20%.  There is a small shallow Hill-Sachs lesion.  Mild tendinosis in the supraspinatus with no complete tear.  There are changes in the anterior glenoid from prior labral repair. Some bony edema in the posterior superior humeral head, possibly from from recent dislocation episode. Small separation between superior labrum and glenoid consistent with possible type 2 SLAP tear, likely degenerative in nature.

## 2024-12-26 ENCOUNTER — APPOINTMENT (OUTPATIENT)
Dept: PHYSICAL THERAPY | Facility: CLINIC | Age: 39
End: 2024-12-26
Payer: COMMERCIAL

## 2024-12-26 LAB
COTININE, URINE: NEGATIVE NG/ML
Lab: NORMAL

## 2024-12-30 ENCOUNTER — APPOINTMENT (OUTPATIENT)
Dept: PHYSICAL THERAPY | Facility: CLINIC | Age: 39
End: 2024-12-30
Payer: COMMERCIAL

## 2024-12-30 LAB
COTININE SERPL-MCNC: <1 NG/ML
NICOTINE SERPL-MCNC: <1 NG/ML

## 2025-01-01 ENCOUNTER — ANESTHESIA EVENT (OUTPATIENT)
Dept: PERIOP | Facility: HOSPITAL | Age: 40
End: 2025-01-01
Payer: COMMERCIAL

## 2025-01-01 NOTE — ANESTHESIA PREPROCEDURE EVALUATION
Procedure:  LATARJET SHOULDER RECONSTRUCTION (Left: Shoulder)  ARTHROSCOPY SHOULDER (Left: Shoulder)    Relevant Problems   CARDIO   (+) Primary hypertension      GI/HEPATIC   (+) GERD (gastroesophageal reflux disease)      NEURO/PSYCH   (+) Mixed anxiety and depressive disorder        Physical Exam    Airway    Mallampati score: II  TM Distance: >3 FB  Neck ROM: full     Dental   No notable dental hx     Cardiovascular  Rhythm: regular, Rate: normal, Cardiovascular exam normal    Pulmonary  Pulmonary exam normal Breath sounds clear to auscultation    Other Findings        Anesthesia Plan  ASA Score- 2     Anesthesia Type- general with ASA Monitors.         Additional Monitors:     Airway Plan: ETT.    Comment: Risks/benefits and alternatives discussed including medication reaction, sore throat, aspiration, dental/oropharyngeal/ocular injuries, and/or grave/life threatening anesthetic and surgical emergencies..       Plan Factors-Exercise tolerance (METS): >4 METS.    Chart reviewed.    Patient summary reviewed.      Patient instructed to abstain from smoking on day of procedure. Patient did not smoke on day of surgery.            Induction- intravenous.    Postoperative Plan- Plan for postoperative opioid use. Planned trial extubation        Informed Consent- Anesthetic plan and risks discussed with patient.  I personally reviewed this patient with the CRNA. Discussed and agreed on the Anesthesia Plan with the CRNA..

## 2025-01-02 ENCOUNTER — HOSPITAL ENCOUNTER (OUTPATIENT)
Facility: HOSPITAL | Age: 40
Setting detail: OUTPATIENT SURGERY
Discharge: HOME | End: 2025-01-02
Attending: STUDENT IN AN ORGANIZED HEALTH CARE EDUCATION/TRAINING PROGRAM | Admitting: STUDENT IN AN ORGANIZED HEALTH CARE EDUCATION/TRAINING PROGRAM
Payer: COMMERCIAL

## 2025-01-02 ENCOUNTER — ANESTHESIA (OUTPATIENT)
Dept: PERIOP | Facility: HOSPITAL | Age: 40
End: 2025-01-02
Payer: COMMERCIAL

## 2025-01-02 ENCOUNTER — APPOINTMENT (OUTPATIENT)
Dept: RADIOLOGY | Facility: HOSPITAL | Age: 40
End: 2025-01-02
Payer: COMMERCIAL

## 2025-01-02 VITALS
HEART RATE: 85 BPM | RESPIRATION RATE: 15 BRPM | HEIGHT: 71 IN | WEIGHT: 233.7 LBS | DIASTOLIC BLOOD PRESSURE: 62 MMHG | SYSTOLIC BLOOD PRESSURE: 141 MMHG | TEMPERATURE: 97.8 F | OXYGEN SATURATION: 94 % | BODY MASS INDEX: 32.72 KG/M2

## 2025-01-02 DIAGNOSIS — S42.292A HILL SACHS DEFORMITY, LEFT: Primary | ICD-10-CM

## 2025-01-02 DIAGNOSIS — Z98.890 S/P SHOULDER SURGERY: Primary | ICD-10-CM

## 2025-01-02 DIAGNOSIS — M24.412 RECURRENT ANTERIOR DISLOCATION OF LEFT SHOULDER: ICD-10-CM

## 2025-01-02 DIAGNOSIS — M25.312 INSTABILITY OF LEFT SHOULDER JOINT: ICD-10-CM

## 2025-01-02 PROCEDURE — C1713 ANCHOR/SCREW BN/BN,TIS/BN: HCPCS | Performed by: STUDENT IN AN ORGANIZED HEALTH CARE EDUCATION/TRAINING PROGRAM

## 2025-01-02 PROCEDURE — 23462 REPAIR SHOULDER CAPSULE: CPT

## 2025-01-02 PROCEDURE — 73030 X-RAY EXAM OF SHOULDER: CPT

## 2025-01-02 PROCEDURE — 29823 SHO ARTHRS SRG XTNSV DBRDMT: CPT | Performed by: STUDENT IN AN ORGANIZED HEALTH CARE EDUCATION/TRAINING PROGRAM

## 2025-01-02 PROCEDURE — 23462 REPAIR SHOULDER CAPSULE: CPT | Performed by: STUDENT IN AN ORGANIZED HEALTH CARE EDUCATION/TRAINING PROGRAM

## 2025-01-02 PROCEDURE — 29823 SHO ARTHRS SRG XTNSV DBRDMT: CPT

## 2025-01-02 DEVICE — 3.5MM CORTEX SCREW SELF-TAPPING 38MM: Type: IMPLANTABLE DEVICE | Site: SHOULDER | Status: FUNCTIONAL

## 2025-01-02 RX ORDER — ROCURONIUM BROMIDE 10 MG/ML
INJECTION, SOLUTION INTRAVENOUS AS NEEDED
Status: DISCONTINUED | OUTPATIENT
Start: 2025-01-02 | End: 2025-01-02

## 2025-01-02 RX ORDER — ASPIRIN 81 MG/1
81 TABLET, CHEWABLE ORAL DAILY
Qty: 28 TABLET | Refills: 0 | Status: SHIPPED | OUTPATIENT
Start: 2025-01-02 | End: 2025-01-30

## 2025-01-02 RX ORDER — LIDOCAINE HYDROCHLORIDE 10 MG/ML
0.5 INJECTION, SOLUTION EPIDURAL; INFILTRATION; INTRACAUDAL; PERINEURAL ONCE AS NEEDED
Status: DISCONTINUED | OUTPATIENT
Start: 2025-01-02 | End: 2025-01-03 | Stop reason: HOSPADM

## 2025-01-02 RX ORDER — BUPIVACAINE HYDROCHLORIDE 5 MG/ML
INJECTION, SOLUTION EPIDURAL; INTRACAUDAL
Status: COMPLETED | OUTPATIENT
Start: 2025-01-02 | End: 2025-01-02

## 2025-01-02 RX ORDER — MELOXICAM 15 MG/1
15 TABLET ORAL DAILY
Qty: 15 TABLET | Refills: 0 | Status: SHIPPED | OUTPATIENT
Start: 2025-01-02 | End: 2025-01-17

## 2025-01-02 RX ORDER — SODIUM CHLORIDE, SODIUM LACTATE, POTASSIUM CHLORIDE, CALCIUM CHLORIDE 600; 310; 30; 20 MG/100ML; MG/100ML; MG/100ML; MG/100ML
125 INJECTION, SOLUTION INTRAVENOUS CONTINUOUS
Status: DISCONTINUED | OUTPATIENT
Start: 2025-01-02 | End: 2025-01-03 | Stop reason: HOSPADM

## 2025-01-02 RX ORDER — OXYCODONE HYDROCHLORIDE 5 MG/1
5 TABLET ORAL EVERY 4 HOURS PRN
Qty: 20 TABLET | Refills: 0 | Status: SHIPPED | OUTPATIENT
Start: 2025-01-02

## 2025-01-02 RX ORDER — ONDANSETRON 2 MG/ML
4 INJECTION INTRAMUSCULAR; INTRAVENOUS ONCE AS NEEDED
Status: COMPLETED | OUTPATIENT
Start: 2025-01-02 | End: 2025-01-02

## 2025-01-02 RX ORDER — HYDROMORPHONE HCL IN WATER/PF 6 MG/30 ML
0.2 PATIENT CONTROLLED ANALGESIA SYRINGE INTRAVENOUS
Status: DISCONTINUED | OUTPATIENT
Start: 2025-01-02 | End: 2025-01-02 | Stop reason: HOSPADM

## 2025-01-02 RX ORDER — CEFAZOLIN SODIUM 2 G/50ML
2000 SOLUTION INTRAVENOUS ONCE
Status: COMPLETED | OUTPATIENT
Start: 2025-01-02 | End: 2025-01-02

## 2025-01-02 RX ORDER — TRANEXAMIC ACID 10 MG/ML
1000 INJECTION, SOLUTION INTRAVENOUS ONCE
Status: COMPLETED | OUTPATIENT
Start: 2025-01-02 | End: 2025-01-02

## 2025-01-02 RX ORDER — ONDANSETRON 2 MG/ML
INJECTION INTRAMUSCULAR; INTRAVENOUS AS NEEDED
Status: DISCONTINUED | OUTPATIENT
Start: 2025-01-02 | End: 2025-01-02

## 2025-01-02 RX ORDER — FENTANYL CITRATE 50 UG/ML
INJECTION, SOLUTION INTRAMUSCULAR; INTRAVENOUS AS NEEDED
Status: DISCONTINUED | OUTPATIENT
Start: 2025-01-02 | End: 2025-01-02

## 2025-01-02 RX ORDER — FENTANYL CITRATE/PF 50 MCG/ML
25 SYRINGE (ML) INJECTION
Status: DISCONTINUED | OUTPATIENT
Start: 2025-01-02 | End: 2025-01-02 | Stop reason: HOSPADM

## 2025-01-02 RX ORDER — CEPHALEXIN 500 MG/1
500 CAPSULE ORAL EVERY 6 HOURS SCHEDULED
Qty: 8 CAPSULE | Refills: 0 | Status: SHIPPED | OUTPATIENT
Start: 2025-01-02 | End: 2025-01-04

## 2025-01-02 RX ORDER — HYDROMORPHONE HCL/PF 1 MG/ML
SYRINGE (ML) INJECTION AS NEEDED
Status: DISCONTINUED | OUTPATIENT
Start: 2025-01-02 | End: 2025-01-02

## 2025-01-02 RX ORDER — CHLORHEXIDINE GLUCONATE 40 MG/ML
SOLUTION TOPICAL DAILY PRN
Status: DISCONTINUED | OUTPATIENT
Start: 2025-01-02 | End: 2025-01-03 | Stop reason: HOSPADM

## 2025-01-02 RX ORDER — ONDANSETRON 4 MG/1
4 TABLET, FILM COATED ORAL EVERY 8 HOURS PRN
Qty: 4 TABLET | Refills: 0 | Status: SHIPPED | OUTPATIENT
Start: 2025-01-02

## 2025-01-02 RX ORDER — MAGNESIUM HYDROXIDE 1200 MG/15ML
LIQUID ORAL AS NEEDED
Status: DISCONTINUED | OUTPATIENT
Start: 2025-01-02 | End: 2025-01-02 | Stop reason: HOSPADM

## 2025-01-02 RX ORDER — LIDOCAINE HYDROCHLORIDE 10 MG/ML
INJECTION, SOLUTION EPIDURAL; INFILTRATION; INTRACAUDAL; PERINEURAL AS NEEDED
Status: DISCONTINUED | OUTPATIENT
Start: 2025-01-02 | End: 2025-01-02

## 2025-01-02 RX ORDER — CHLORHEXIDINE GLUCONATE ORAL RINSE 1.2 MG/ML
15 SOLUTION DENTAL ONCE
Status: COMPLETED | OUTPATIENT
Start: 2025-01-02 | End: 2025-01-02

## 2025-01-02 RX ORDER — MIDAZOLAM HYDROCHLORIDE 2 MG/2ML
INJECTION, SOLUTION INTRAMUSCULAR; INTRAVENOUS AS NEEDED
Status: DISCONTINUED | OUTPATIENT
Start: 2025-01-02 | End: 2025-01-02

## 2025-01-02 RX ORDER — ACETAMINOPHEN 500 MG
1000 TABLET ORAL EVERY 8 HOURS
Qty: 90 TABLET | Refills: 0 | Status: SHIPPED | OUTPATIENT
Start: 2025-01-02 | End: 2025-01-17

## 2025-01-02 RX ORDER — PROPOFOL 10 MG/ML
INJECTION, EMULSION INTRAVENOUS AS NEEDED
Status: DISCONTINUED | OUTPATIENT
Start: 2025-01-02 | End: 2025-01-02

## 2025-01-02 RX ADMIN — ONDANSETRON 4 MG: 2 INJECTION INTRAMUSCULAR; INTRAVENOUS at 14:59

## 2025-01-02 RX ADMIN — CHLORHEXIDINE GLUCONATE 0.12% ORAL RINSE 15 ML: 1.2 LIQUID ORAL at 10:04

## 2025-01-02 RX ADMIN — SUGAMMADEX 200 MG: 100 INJECTION, SOLUTION INTRAVENOUS at 14:59

## 2025-01-02 RX ADMIN — LIDOCAINE HYDROCHLORIDE 50 MG: 10 INJECTION, SOLUTION EPIDURAL; INFILTRATION; INTRACAUDAL; PERINEURAL at 11:26

## 2025-01-02 RX ADMIN — BUPIVACAINE HYDROCHLORIDE 10 ML: 5 INJECTION, SOLUTION EPIDURAL; INTRACAUDAL; PERINEURAL at 11:15

## 2025-01-02 RX ADMIN — FENTANYL CITRATE 50 MCG: 50 INJECTION, SOLUTION INTRAMUSCULAR; INTRAVENOUS at 11:09

## 2025-01-02 RX ADMIN — CEFAZOLIN SODIUM 2000 MG: 2 SOLUTION INTRAVENOUS at 11:35

## 2025-01-02 RX ADMIN — PROPOFOL 170 MG: 10 INJECTION, EMULSION INTRAVENOUS at 11:26

## 2025-01-02 RX ADMIN — SODIUM CHLORIDE, SODIUM LACTATE, POTASSIUM CHLORIDE, AND CALCIUM CHLORIDE: .6; .31; .03; .02 INJECTION, SOLUTION INTRAVENOUS at 11:25

## 2025-01-02 RX ADMIN — PROPOFOL 50 MG: 10 INJECTION, EMULSION INTRAVENOUS at 12:27

## 2025-01-02 RX ADMIN — HYDROMORPHONE HYDROCHLORIDE 0.5 MG: 1 INJECTION, SOLUTION INTRAMUSCULAR; INTRAVENOUS; SUBCUTANEOUS at 14:37

## 2025-01-02 RX ADMIN — MIDAZOLAM HYDROCHLORIDE 1 MG: 1 INJECTION, SOLUTION INTRAMUSCULAR; INTRAVENOUS at 11:25

## 2025-01-02 RX ADMIN — ONDANSETRON 4 MG: 2 INJECTION INTRAMUSCULAR; INTRAVENOUS at 15:34

## 2025-01-02 RX ADMIN — ROCURONIUM BROMIDE 20 MG: 10 INJECTION, SOLUTION INTRAVENOUS at 12:48

## 2025-01-02 RX ADMIN — ROCURONIUM BROMIDE 20 MG: 10 INJECTION, SOLUTION INTRAVENOUS at 12:29

## 2025-01-02 RX ADMIN — PHENYLEPHRINE HYDROCHLORIDE 30 MCG/MIN: 10 INJECTION INTRAVENOUS at 11:40

## 2025-01-02 RX ADMIN — TRANEXAMIC ACID 1000 MG: 10 INJECTION, SOLUTION INTRAVENOUS at 11:44

## 2025-01-02 RX ADMIN — BUPIVACAINE 20 ML: 13.3 INJECTION, SUSPENSION, LIPOSOMAL INFILTRATION at 11:15

## 2025-01-02 RX ADMIN — PROPOFOL 30 MG: 10 INJECTION, EMULSION INTRAVENOUS at 11:28

## 2025-01-02 RX ADMIN — ROCURONIUM BROMIDE 50 MG: 10 INJECTION, SOLUTION INTRAVENOUS at 11:27

## 2025-01-02 RX ADMIN — ROCURONIUM BROMIDE 10 MG: 10 INJECTION, SOLUTION INTRAVENOUS at 13:32

## 2025-01-02 RX ADMIN — FENTANYL CITRATE 50 MCG: 50 INJECTION, SOLUTION INTRAMUSCULAR; INTRAVENOUS at 11:26

## 2025-01-02 RX ADMIN — ROCURONIUM BROMIDE 10 MG: 10 INJECTION, SOLUTION INTRAVENOUS at 14:06

## 2025-01-02 RX ADMIN — ROCURONIUM BROMIDE 10 MG: 10 INJECTION, SOLUTION INTRAVENOUS at 12:34

## 2025-01-02 RX ADMIN — MIDAZOLAM HYDROCHLORIDE 1 MG: 1 INJECTION, SOLUTION INTRAMUSCULAR; INTRAVENOUS at 11:09

## 2025-01-02 NOTE — INTERVAL H&P NOTE
I also reviewed pt's cotinine and nicotine testing which was negative.    Resp: CTAB  CV: Normal S1/S2 sounds    Vitals:    01/02/25 1002   BP: 166/79   Pulse: 85   Resp: 18   Temp: 97.7 °F (36.5 °C)   SpO2: 96%

## 2025-01-02 NOTE — DISCHARGE INSTR - AVS FIRST PAGE
POSTOPERATIVE INSTRUCTIONS following Latarjet Shoulder Reconstruction     MEDICATIONS:  Resume all home medications unless otherwise instructed by your surgeon.  Pain Medication:   Take Tylenol 1000mg three times a day (every 8 hours)  Meloxicam 15 mg once daily on prescribed schedule for 10 days  Take 5mg Oxycodone as needed every 4-6 hours for severe / breakthrough pain   If you were given a regional anesthetic (nerve block), it is helpful to take your pain medication before the block wears off.    Possible side effects include nausea, constipation, and urinary retention.  If you experience these side effects, please call our office for assistance.  Pain med refills are authorized only during office hours (8am-4pm, Mon-Fri).  Nausea Medication:   Zofran 4mg, take 1 tablet every 6 hours as needed for nausea or vomiting   Fill prescription ONLY if you expericnce severe nausea.  Blood Clot Prevention:   Pump your foot up and down 20 times per hour while you are less mobile.  Ambulate with your crutches at least once every hour   Take Aspirin 81mg once daily for 4 weeks  Constipation Medication:  Take 50 mg colace 1-2 times daily as needed for constipation. This can be purchased over the counter.  Antibiotics :  Take Keflex 500 mg every 6 hours for 2 days.   Medication taken may have significant effects following discharge; therefore on the day of surgery:  YOU MUST BE ACCOMPANIED by a responsible adult upon discharge and for 24 hours after surgery.  DO NOT DRIVE a motor vehicle; operate machinery, power tools or appliances, for at least 2 weeks or unless the surgeon allows prior.  Change positions slowly and with care.  EATING: You may resume a regular diet after starting slowly with liquid diet    WOUND CARE:  DO NOT REMOVE dressings until post operative appointment. Keep clean, dry, and intact .   If there is wound drainage, re-apply a fresh dry gauze dressing.  Please call our office ([phone]) if you  experience either of the following:  Sudden increase in swelling, redness, or warmth at the surgical site  Excessive incisional drainage that persists beyond the 3rd day after surgery  Oral temperature greater than 101 degrees, not relieved with Tylenol  Shortness of breath, chest pain, nausea, or any other concerning symptoms  If you have not urinated within 12 hours of discharge.  If it is after hours, go to the Emergency Room for evaluation    ICE:  Apply ice (15 min on, 15 min off) as often as you feel is necessary, at least 3-4x per day.  Ice helps with pain and swelling.    SLING:  Your elbow brace should be WORN AT ALL TIMES, including sleep.  You may remove the brace only for showering.    ACTIVITY:   DO NOT lift, carry, push, or pull anything with your operative arm.  You are NOT ALLOWED RANGE OF MOTION until you are given permission from your surgeon.  Place a pillow behind the elbow while lying down.  Sleeping in a more upright position (recliner) may be more comfortable initially.    PHYSICAL THERAPY:  You will be given a physical therapy prescription when you are seen in the office for your postoperative appointment.    FOLLOW-UP APPOINTMENT:  Follow up 01/13/2025 with:      Reji Perez MD    [facility] [place] [address]    Phone: [phone]

## 2025-01-02 NOTE — ANESTHESIA POSTPROCEDURE EVALUATION
Post-Op Assessment Note    CV Status:  Stable  Pain Score: 0    Pain management: adequate       Mental Status:  Sleepy and arousable   Hydration Status:  Euvolemic   PONV Controlled:  Controlled   Airway Patency:  Patent     Post Op Vitals Reviewed: Yes    No anethesia notable event occurred.    Staff: CRNA           Last Filed PACU Vitals:  Vitals Value Taken Time   Temp 97.3 °F (36.3 °C) 01/02/25 1513   Pulse 82 01/02/25 1513   /74 01/02/25 1513   Resp 15 01/02/25 1513   SpO2 92 % 01/02/25 1513

## 2025-01-02 NOTE — ANESTHESIA PROCEDURE NOTES
Peripheral Block    Patient location during procedure: pre-op  Start time: 1/2/2025 11:15 AM  Reason for block: at surgeon's request and post-op pain management  Staffing  Performed by: Elias Ceballos MD  Authorized by: Elias Ceballos MD    Preanesthetic Checklist  Completed: patient identified, IV checked, site marked, risks and benefits discussed, surgical consent, monitors and equipment checked, pre-op evaluation and timeout performed  Peripheral Block  Patient position: sitting  Prep: ChloraPrep  Patient monitoring: heart rate, cardiac monitor, frequent blood pressure checks and continuous pulse oximetry  Block type: Interscalene  Laterality: left  Injection technique: single-shot  Procedures: ultrasound guided, Ultrasound guidance required for the procedure to increase accuracy and safety of medication placement and decrease risk of complications.  Ultrasound permanent image saved  bupivacaine (PF) (MARCAINE) 0.5 % injection 20 mL - Perineural   10 mL - 1/2/2025 11:15:00 AM  bupivacaine liposomal (EXPAREL) 1.3 % injection 20 mL - Perineural   20 mL - 1/2/2025 11:15:00 AM  Needle  Needle type: Stimuplex   Needle gauge: 22 G  Needle length: 4 in  Needle localization: ultrasound guidance and anatomical landmarks  Assessment  Injection assessment: incremental injection, local visualized surrounding nerve on ultrasound, negative aspiration for heme, no paresthesia on injection, negative aspiration, negative for heart rate change, injected with ease, no symptoms of intraneural/intravenous injection, frequent aspiration and needle tip visualized at all times  Paresthesia pain: none  Post-procedure:  site cleaned  patient tolerated the procedure well with no immediate complications  Additional Notes  Unremarkable interscalene block

## 2025-01-02 NOTE — INTERVAL H&P NOTE
H&P reviewed. After examining the patient I find no changes in the patients condition since the H&P had been written. Mr Juanpablo Amaya Jr. is indicated for left shoulder arthroscopy with possible debridement, possible superior labral (SLAP) repair, possible biceps tenodesis, and open Latarjet (coracoid transfer) procedure with anterior labral repair / capsulorraphy. The risks, benefits, alternatives and surgical details were discussed with him at length. Pre-operative discussion is well documented in the medical record. I again in the pre-operative holding area reviewed risks of surgery including musculocutaneous nerve injury, axillary nerve injury, hardware breakage / complication, cartilage damage, hematoma, vascular injury, fracture, hardware loosening, recurrent instability, arthritis, and need for revision surgery.     All of his questions were answered and informed consent was obtained.  I initialed his left Shoulder in the holding area.    I have reviewed the history and physical - recorded 1 to 30 days prior to admission and I have reexamined the patient.  I state that it is still appropriate with my corrections and/or amendments as documented. Proximal biceps tendon non-tender to palpation. Negative Speed's test. Pt denies significant biceps pain. He does state that since his MRI, he has had multiple subluxation events with his arm in the abducted / externally rotated position when reaching overhead.      Vitals:    01/02/25 1002   BP: 166/79   Pulse: 85   Resp: 18   Temp: 97.7 °F (36.5 °C)   SpO2: 96%

## 2025-01-02 NOTE — ANESTHESIA POSTPROCEDURE EVALUATION
Post-Op Assessment Note    CV Status:  Stable  Pain Score: 0    Pain management: adequate       Mental Status:  Awake and sleepy   Hydration Status:  Stable   PONV Controlled:  None   Airway Patency:  Patent     Post Op Vitals Reviewed: Yes    No anethesia notable event occurred.    Staff: Anesthesiologist           Last Filed PACU Vitals:  Vitals Value Taken Time   Temp 97.7 °F (36.5 °C) 01/02/25 1545   Pulse 83 01/02/25 1546   /66 01/02/25 1545   Resp 13 01/02/25 1546   SpO2 91 % 01/02/25 1546   Vitals shown include unfiled device data.    Modified Cristóbal:     Vitals Value Taken Time   Activity 2 01/02/25 1545   Respiration 2 01/02/25 1545   Circulation 2 01/02/25 1545   Consciousness 1 01/02/25 1545   Oxygen Saturation 2 01/02/25 1545     Modified Cristóbal Score: 9

## 2025-01-03 ENCOUNTER — TELEPHONE (OUTPATIENT)
Dept: OBGYN CLINIC | Facility: CLINIC | Age: 40
End: 2025-01-03

## 2025-01-03 NOTE — OP NOTE
OPERATIVE REPORT  PATIENT NAME: Juanpablo Amaya Jr.    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     Procedure(s):  Left shoulder diagnostic arthroscopy and limited debridement  2.   Left shoulder open anterior capsulorrhaphy with coracoid process transfer, Latarjet procedure.     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 His history is notable for prior arthroscopic left shoulder anterior labral repair approximately 10 years ago. He has history of dislocation during night, and in low abduction angles. Given his young age, demanding job involving a lot of heavy labor, and bone loss, the patient has a higher risk of recurrent shoulder dislocation. The patient has failed conservative management including  anti-inflammatory medications, activity modification, and physical therapy for more than 6 months. Because of his bipolar bone loss and poor quality capsulolabral tissues (recurrent dislocation) non-operative and arthroscopic instability repair will have high chances of failure. We mutually decided to proceed with left shoulder arthroscopy and anterior capsulorrhaphy and coracoid process transfer (Latarjet procedure).     Surgery is indicated to minimize the risk of  recurrent instability, relief of apprehension, improvement of quality of life and shoulder function. I discussed in detail with Juanpablo the indications for this surgery, the technical aspects of surgery, the risks, benefits and alternatives to the proposed surgical procedure. I also discussed in detail, the reasons that can result in change in intraoperative plan, which happen rarely, for example, if the graft fractures I will have to take autograft from iliac bone or use a plate. Complications of the procedure were discussed including but not limited to death, infection, stroke, nerve injury, vascular injury, damage to intraarticular structures including ligaments, tendon, cartilage, bone, intraoperative fracture, hardware failure and other hardware related complications, future risk of arthritis, recurrence of dislocation, chondrolysis, failure to return to same level of activity, need for revision surgery, persistent shoulder pain and weakness, post operative stiffness, surgical scar including keloid and hypertrophic scar, neck pain and stiffness and postoperative blood clot. We did discuss the management of these complications if they do happen. Juanpablo does have a history of tobacco use, and we had a lengthy discussion regarding this prior to surgery. I informed him that any tobacco / nicotine use increased his risk for graft non-union and other complications including infection and wound complications. I did inform him that this was a major surgery with risk for complications, and that, to pre-operatively medically optimize him, I would require him to quit all nicotine products prior to surgery. He did quit all nicotine products and has been using Chantix, and pre-operative nicotine and cotinine testing were negative, indicating that he had quit for at least three weeks. I confirmed with him again on the day of surgery that he had still quit all nicotine / tobacco products which he confirmed. An informed  consent was obtained.     The post operative rehabilitation and its average duration, pain medication requirements in immediate postoperative period, time spent in sling, average time to return to work, driving restrictions in the postoperative period, and time for maximum medical improvement were discussed in detail. Pre-operative surgical discussion is well documented in the electronic medical record.    Operative Findings:  Examination under anesthesia:  1. Load and shift (anterior): grade 3    2. Load and shift (posterior): grade 1  3. Sulcus- grade 1  4. Passive range of motion: FE: 0-170, ADER 50, ABER 90, ABIR 70    Intraoperative Findings  Diagnostic arthroscopy demonstrated significant anterior inferior glenoid bone loss with approximately 20 to 25% loss. Additionally the capsular labral tissue was poor with additional anterior labral tearing just superior to the  anterior inferior glenoid bone loss and significantly attenuated anterior capsulolabral tissue.The humeral head cartilage was intact.  There was grade 3 chondral changes to the rim of the remaining anterior and inferior glenoid as well as grade 2 changes centrally with articular fraying. There was a large Hill-Sachs lesion along the posterior humeral head. There was significant synovitis as well as partial articular sided fraying of supraspinatus involving 1-2 mm of the insertion with no complete major tear, and the there was upper border fraying of subscapularis, however the humeral insertion was intact. There was a type II degenerative SLAP tear of the superior labrum as well as mild tenosynovitis of the proximal biceps anchor without proximal biceps tearing, and no significant fraying or tenosynovitis of the rest of the biceps tendon. Posterior labrum intact with mild fraying. No loose bodies or HAGL.    Complications:   None    Procedure and Technique:    The patient was identified in the holding area. The patient's correct operative extremity  was signed by by myself The patient once again had time to review the consent and all questions were answered. The Anesthesia Team placed a single shot interscalene nerve block. The patient was then brought back to the operating room and positioned supine on the operating table. Care was taken to pad all bony prominences and neurovascular structures. After induction of general anesthesia, the patient was repositioned in the beach chair position. The patient was then prepped and draped in the usual orthopedic sterile manner. A final time-out was conducted confirming the correct patient, correct site, correct side, correct procedures, the patient allergies, as well as the preoperative administration of antibiotics.     DIAGNOSTIC ARTHROSCOPY: Using a standard posterior portal, the arthroscope was entered into the glenohumeral joint. I examined the previous anteroinferior labral tissue and glenoid face with findings as noted above. There was significant bone loss of the anteroinferior glenoid with loose suture from the prior anchors / labral repair. When taking the shoulder joint through a load and shift test, and viewing the joint arthroscopically, it was clear that the humeral head translated beyond the anterior glenoid rim. There was significant synovitis. An anterior portal was then created in the rotator interval allowing for placement of a 7 mm cannula. Using an arthroscopic shaver, a debridement was performed of the superior labral fraying and degenerative type II SLAP tear. A debridement was also performed of the low grade supraspinatus articular sided fraying involving 1-2 mm of the insertion. A debridement was also performed of the glenoid articular cartilage fraying as well as the synovitis present in the rotator interval. After conducting the diagnostic arthroscopy, the arthroscope was removed and we proceeded with the Latarjet procedure.     LATARJET PROCEDURE:  We began by making a standard deltopectoral  incision from the coracoid down towards the axillary fold. The incision was approximately 6 cm in length. The incision was then carried down to the subcutaneous tissues and Bovie electrocautery was used for hemostasis. Full-thickness medial and lateral skin flaps were raised and the fascia plane was identified between the deltoid and the pectoralis. The cephalic vein was then identified and retracted laterally, and then the interval was opened. The clavipectoral fascia was incised and coracoacromial ligament was identified. The CA ligament was cut leaving a cm attached to the lateral border of the coracoid. The coracohumeral ligament was released off the coracoid. The pectoralis minor tendon was identified and released subperiosteally from the coracoid process leaving the conjoint tendon attached to the tip of the coracoid process. The base of the coracoid was identified and the coracoclavicular ligaments were protected. A combination of an oscillating saw and an osteotome was used to perform the coracoid osteotomy, which measured greater than 2 cm in its overall length. Bone wax was placed onto the base of the cut surface of the coracoid and then the coracoid was prepared for the Latarjet procedure. The inferior surface of the coracoid was then leveled and partially decorticated and fashioned so that it would fit appropriately upon the neck of the glenoid.  Two 3.5 mm drill holes were then made into the coracoid for later fixation and the conjoint tendon was tagged with a suture. The musculocutaneous nerve was identified and there was no band constricting the nerve. The axillary nerve was identified as well.     The glenoid exposure was accomplished through a subscapularis split. It was then  from the underlying capsule. A sponge was placed between the subscapularis and capsule to elevate the muscle of the anterior glenoid neck leaving the capsulolabral complex intact. The capsule was then horizontally  split further exposing the glenoid and a triangular capsulectomy was performed of the medial capsule on the glenoid anterior neck, taking care not to injure humeral head cartilage. A Fukuda retractor was then placed to retract the humeral head safely. A k-wire was placed in the superior glenoid to retract upper border of the subscapularis. A two-prong retractor was placed to expose the anterior glenoid neck which was then decorticated and prepped to a flattened surface, by using an arthroscopic 5.0 mm benoit and osteotome, so that it would appropriately fit with respect to the coracoid. Raw bleeding surface was obtained at the anterior glenoid neck. The sponge was then removed. The coracoid was then placed so that it was flush with the glenoid and two 2.5 mm parallel drill holes were placed in the glenoid followed by two 3.5 mm fully threaded screws used to fix the coracoid to the anterior glenoid in a lag by technique fashion. Secure anatomic fixation of the coracoid process onto the neck of the glenoid was achieved. The graft was flush superiorly and there was noted to be some slight lateral overhang at the most inferior aspect of the graft--a 5.0 mm round benoit was used to shave down the inferior graft until it was flush with the glenoid. This was thoroughly checked.    A #0 Vicryl was used to tie the superior and inferior capsule leaflets to the CA ligament, thus repairing the capsule and completing the anterior capsulorraphy. The coracoid graft was intact after the sutures were finally tightened. The sutures through the CA ligament were finally tightened. The capsulotomy and subscapularis split were closed using #2 fiberwire in figure of 8 fashion, taking care to complete this in 30 degrees of external rotation and abduction to avoid over-tightening the shoulder. The shoulder was ranged in external rotation and was found to be stable.    Again, the wound was irrigated. The deltopectoral interval was closed with #1  Vicryl, followed by a #2-0 vicryl for the subcutaneous tissues, then a #3-0 running subcuticular Stratafix with exofin skin adhesive. The portals were closed with 3-0 nylon suture in portal stitch fashion. Sterile dressings were applied. Formal anteroposterior and axillary  lateral radiographs were obtained in the operating room to confirm satisfactory implant placement and reduced glenohumeral joint prior to the patient leaving the operating room, and I personally reviewed these in the operating room prior to the patient being extubated. Final procedure debriefing was performed. All the sponge and needle counts were correct. The patient's arm was placed into a shoulder immobilizer. The patient was awoken from the anesthesia and transferred to the PACU in stable condition after extubation, with no apparent complications.     I was present for the entire procedure. A qualified resident physician was not available, and a physician assistant was required during the procedure for retraction, tissue handling, dissection and suturing.    Post-Operative Plan:     Non-weight bearing on operative extremity.  Follow up in the office in 10-14 days for clinical check, possible, suture removal and wound care.   The patient was given post operative instructions and prescriptions for postoperative pain and anti-nausea medication.   DVT prophylaxis: Since, this is a low risk procedure and the patient has no other major risk factors for thromboembolism, a decision was made to allow active mobilization on lowers (starting right after surgery, when awake) and active use of contralateral arm and and active use of hand, arm, and elbow on the operative side as means of prophylaxis for thromboembolic events.    The intraoperative findings and postoperative plan were discussed with patient and his family.  I will contact Juanpablo by phone tomorrow to check on his status, review today's procedure, confirm that he has adequate pain control  and that all of his questions have been answered.       Patient Disposition:  PACU       SIGNATURE: Reji Perez MD  DATE: January 2, 2025  TIME: 9:07 PM

## 2025-01-04 NOTE — TELEPHONE ENCOUNTER
I called and spoke to Juanpablo Amaya on the phone today. He is POD#1 s/p left shoulder arthroscopy and Latarjet procedure. He reports that he is doing well. Pain well controlled. Block starting to wear off. He is able to wiggle his fingers and actively fire his biceps / triceps / flex and extend his elbow. No complaints. I reviewed his post-operative restrictions and wound care and answered his questions. I reviewed his intra-operative findings and the details of the procedure that was performed. I encouraged elbow / wrist / finger ROM to prevent stiffness and help with swelling. He will follow up in approximately 10 days for his first post-op visit. Given the # of PT visits he has with his insurance, we also discussed a strategy for maximizing his insurance PT benefits based on when he would be able to do more with PT.     Reji Perez MD

## 2025-01-13 ENCOUNTER — HOSPITAL ENCOUNTER (OUTPATIENT)
Dept: RADIOLOGY | Facility: HOSPITAL | Age: 40
Discharge: HOME | End: 2025-01-13
Attending: STUDENT IN AN ORGANIZED HEALTH CARE EDUCATION/TRAINING PROGRAM
Payer: COMMERCIAL

## 2025-01-13 ENCOUNTER — APPOINTMENT (OUTPATIENT)
Dept: LAB | Facility: HOSPITAL | Age: 40
End: 2025-01-13
Attending: FAMILY MEDICINE
Payer: COMMERCIAL

## 2025-01-13 ENCOUNTER — OFFICE VISIT (OUTPATIENT)
Dept: OBGYN CLINIC | Facility: CLINIC | Age: 40
End: 2025-01-13

## 2025-01-13 ENCOUNTER — RESULTS FOLLOW-UP (OUTPATIENT)
Dept: FAMILY MEDICINE CLINIC | Facility: CLINIC | Age: 40
End: 2025-01-13

## 2025-01-13 VITALS — WEIGHT: 233 LBS | HEIGHT: 71 IN | BODY MASS INDEX: 32.62 KG/M2

## 2025-01-13 DIAGNOSIS — M24.412 RECURRENT ANTERIOR DISLOCATION OF LEFT SHOULDER: Primary | ICD-10-CM

## 2025-01-13 DIAGNOSIS — Z98.890 STATUS POST SURGERY: ICD-10-CM

## 2025-01-13 DIAGNOSIS — R79.89 ELEVATED LIVER FUNCTION TESTS: ICD-10-CM

## 2025-01-13 LAB
ALBUMIN SERPL BCG-MCNC: 4.6 G/DL (ref 3.5–5)
ALP SERPL-CCNC: 63 U/L (ref 34–104)
ALT SERPL W P-5'-P-CCNC: 49 U/L (ref 7–52)
AST SERPL W P-5'-P-CCNC: 20 U/L (ref 13–39)
BILIRUB DIRECT SERPL-MCNC: 0.04 MG/DL (ref 0–0.2)
BILIRUB SERPL-MCNC: 0.38 MG/DL (ref 0.2–1)
PROT SERPL-MCNC: 8.1 G/DL (ref 6.4–8.4)

## 2025-01-13 PROCEDURE — 80076 HEPATIC FUNCTION PANEL: CPT

## 2025-01-13 PROCEDURE — 36415 COLL VENOUS BLD VENIPUNCTURE: CPT

## 2025-01-13 PROCEDURE — 99024 POSTOP FOLLOW-UP VISIT: CPT | Performed by: STUDENT IN AN ORGANIZED HEALTH CARE EDUCATION/TRAINING PROGRAM

## 2025-01-13 PROCEDURE — 73030 X-RAY EXAM OF SHOULDER: CPT

## 2025-01-13 NOTE — PROGRESS NOTES
Orthopaedic Sports Medicine Shoulder Post-Operative Visit    Assessment:   Juanpablo is 11 days status post left shoulder arthroscopy and Latarjet (coracoid transfer) procedure on 1/2/25 and presents today for his first post-operative visit. He is overall doing very well.    Plan:     I had a lengthy discussion with Juanpablo during which we reviewed his intra-operative findings including the intra-operative arthroscopy images. We discussed that arthroscopy did confirm his significant anterior glenoid bone loss, torn labrum with attenuated capsule, and mild degenerative changes of his glenoid cartilage. We demonstrated that he had some mild fraying of his superior labrum and biceps anchor, but no major tear or unstable lesion. He was given a script for physical therapy and will begin phase I of physical therapy as per my shoulder Latarjet reconstruction protocol with PROM exercises with restrictions as specified in the protocol. He will remain non-weight bearing with the left upper extremity and will maintain the sling at all times except for hygiene. We discussed the use of pain control medication as well as the importance of active motion of the wrist and hand as well as his contralateral arm and walking to minimize the risk of DVT and assist with swelling.     I emphasized to Juanpablo the importance of avoiding active motion of the shoulder as well as minimizing active flexion of his arm to minimize pull on the coracobrachialis which is attached to the transferred coracoid process. He voiced understanding, and I showed him some methods for performing ADL's while minimizing active left shoulder motion and I also reviewed some passive range of motion exercises with him.    His sutures were removed today from portal sites and steri strips were applied,and we reviewed steri strip precautions including the importance of not removing them and letting them fall off over the next 7-10 days, We reviewed wound care,  including that he could allow soap and water to run over the incision but should avoid soaking or aggressive scrubbing. All his questions were answered in detail. He was encouraged to reach out via phone or MyChart if he has any questions or concerns.    He will follow up in 4 weeks w/ repeat L shoulder x-rays for check and advancement of PT.      CHIEF COMPLAINT:  Chief Complaint   Patient presents with    Left Shoulder - Post-op       SUBJECTIVE:  Juanpablo Amaya Jr. is a 39 y.o. year old male who presents for his first post-operative visit following left shoulder arthroscopy and Latarjet (coracoid transfer) reconstruction on 1/2/25. Pt reports that he is overall doing well. Denies any falls or injuries. He reports some mild pain localizing to the operative site, but overall he feels his pain is well controlled and states he has less pain than his first surgery several years ago, and that the nerve block helped a lot. Pt denies any fevers or chills. Denies any numbness or tingling. He denies any drainage from his incisions. He has been actively ranging his shoulder and elbow to get in and out of clothes but otherwise he states he has remained compliant in wearing his sling full time except for hygiene. He continues to take Chantix and is not using tobacco products. No other complaints or injuries.       PHYSICAL EXAMINATION:    Gen: NAD, well-appearing  A&O x 3    MUSCULOSKELETAL EXAMINATION:     Left shoulder  Dressings in place, removed  Deltopectoral incision clean, dry, intact, healing well, dermabond in place  Anterior and posterior portals well-healing, nylon sutures in place  No kenroy-incisional erythema or induration or drainage or purulence or fluctuance  Mild postoperative swelling as expected  Range of Motion: Deferred due to recent surgery  Neurovascular status: Sensation intact to light touch and symmetric to contralateral side in in axillary, median, radial, and ulnar nerve distributions  Firing  deltoid and biceps / triceps  5/5 EPL / IO / FPL strength  Pulses: 2+ radial pulse  Fingers warm and well perfused    Nylon sutures from arthroscopy portals removed, incisions cleaned with alcohol and steri strips applied      STUDIES REVIEWED:  I personally reviewed and interpreted 2 views of the left shoulder (Grashey and Scapular Y) obtained in the office today which demonstrate left shoulder s/p L shoulder coracoid transfer with intact hardware, no evidence of hardware loosening or migration of the coracoid bone block or screw loosening on the scapular Y view. No evidence of any other acute fracture or dislocation.

## 2025-01-14 ENCOUNTER — TELEPHONE (OUTPATIENT)
Age: 40
End: 2025-01-14

## 2025-01-14 NOTE — TELEPHONE ENCOUNTER
Caller: Patient    Doctor: Chris Ramirez    Reason for call: Patient is calling in regards to PT script and would like to know when specifically he should start his PT program; please advise.     Call back#: [phone]

## 2025-01-23 ENCOUNTER — EVALUATION (OUTPATIENT)
Dept: PHYSICAL THERAPY | Facility: CLINIC | Age: 40
End: 2025-01-23
Payer: COMMERCIAL

## 2025-01-23 DIAGNOSIS — M24.412 RECURRENT ANTERIOR DISLOCATION OF LEFT SHOULDER: ICD-10-CM

## 2025-01-23 PROCEDURE — 97162 PT EVAL MOD COMPLEX 30 MIN: CPT

## 2025-01-23 NOTE — PROGRESS NOTES
PT Evaluation     Today's date: 2025  Patient name: Juanpablo Amaya Jr.  : 1985  MRN: [id number]  Referring provider: Reji Perez MD  Dx:   Encounter Diagnosis     ICD-10-CM    1. Recurrent anterior dislocation of left shoulder  M24.412 Ambulatory Referral to Physical Therapy                     Assessment  Impairments: abnormal muscle firing, abnormal muscle tone, abnormal or restricted ROM, abnormal movement, impaired physical strength, lacks appropriate home exercise program, pain with function and poor posture     Assessment details: Juanpablo Amaya Jr. is a pleasant 39 y.o. male who presents s/p left shoulder laterjet procedure with coracoid transfer and anteriorcaspuloraphy.   The patient's greatest concerns are optimizing shoulder surgery outcomes.  Patient referred by ortho to strengthen shoulder for anticipated laterjet/coracoid transfer stability surgery.    Primary movement impairment diagnosis of strength deficits resulting in pathoanatomical symptoms of Instability of left shoulder joint  Hill Sachs deformity, left and limiting his ability to go to work, lift, and wash behind the back.    Impairments include:  1) RTC strength  2) scapulothoracic strength  3)  Glenohumeral stability    Etiologic factors include repeated falling on out stretched arm placing shoulder into inferior instability and history of repeated motion stressing ligament laxity of glenohumeral region.    Discussed risks, benefits, and alternatives to treatment, and answered all patient questions to patient satisfaction.  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          Plan  Patient would benefit from: skilled physical therapy  Planned modality interventions: cryotherapy, TENS, thermotherapy: hydrocollator packs and unattended electrical stimulation    Planned therapy interventions: abdominal trunk stabilization, behavior modification, body mechanics training, breathing training, flexibility, functional ROM exercises, home exercise program, joint mobilization, manual therapy, massage, Davidson taping, muscle pump exercises, neuromuscular re-education, patient education, postural training, strengthening, stretching, therapeutic activities, therapeutic exercise and therapeutic training    Frequency: 2x week  Duration in weeks: 8  Treatment plan discussed with: patient  Plan details: Prognosis above is given PT services 2x/week tapering to 1x/week over the next 2 months and home program adherence.        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  Hx of bankart repair with minimal improvement.  He comments that his instability is also in part to \"party trick\" activities where he would use his arms to jump rope.    PAIN LOCATION/DESCRIPTORS: side delt when in certain positions  AGGRAVATING FACTORS:  Fxn IR and 90 Abdcution, and empty can provoke symptoms  SPECIAL QUESTIONS:    Juanpablo denies a new onset of Tingling and Numbness.  PATIENT GOALS:  strengthen for the surgery  Pain  Current pain ratin  At best pain ratin  At worst pain ratin  Quality: grinding, pressure, sharp and tight  Aggravating factors: overhead activity        Objective     Active Range of Motion "     Additional Active Range of Motion Details  Deferred 2/2 protocol    Passive Range of Motion     Additional Passive Range of Motion Details  Will capture Left shoulder, abduction next visit per protocoll    Strength/Myotome Testing     Right Shoulder     Planes of Motion   Flexion: 4   Extension: 4+   Abduction: 4   Adduction: 4   External rotation at 0°: 4   External rotation at 45°: 4   External rotation at 90°: 4-   Internal rotation at 0°: 4     Isolated Muscles   Lower trapezius: 4+   Middle trapezius: 4+     Additional Strength Details  Left Shoulder strength assessment deferred 2/2 phases of healing    General Comments:      Shoulder Comments     UQS (negative)    C/S ROM WFL - no incitation of of symptoms               POC Expires Auth Status Start Date Expiration Date PT Visit Limit           Date   1/23/2025       Used        Remaining           Diagnosis: Left Laterjet (op 1/02/25)   Precautions: per protocol, NO ER in phase 1   Primary Goals: pain movement   *asterisks by exercise = given for HEP   Manuals             DO FOTO                                   There Ex        Wrist AROM* x10       C/s stretch* x10                                                               Neuro Re-Ed                                                                                                Patient education Diagnosis, prognosis, activity modification        Re-evaluation              Ther Act                                         Modalities                                                    PT protocol for Latarjet Surgery (Coracoid Process Transfer)    Juanpablo Amaya Jr.  Diagnosis: S/P left shoulder Latarjet reconstruction (coracoid transfer and anterior capsulorraphy)  DOS: 1/2/25  Frequency: 2 times/week  Duration: 12 Weeks    Phase I (Protection phase): Weeks 1-6    Precautions:  AVOID lifting objects with operative arm  AVOID provocative position: combined abduction and external rotation, combined  extension and external rotation  Follow the ROM restrictions with no end range passive stretching  NO Upper Body Ergometer, Handweights, Body Blade or Therabands    Immobilization  Sling for 5 weeks. Wean off after 5 weeks  Wear sling at all times including night except when doing therapy, shower, or changing    Exercises guidelines  PROM to start a week after surgery  Restrict motion to 90° PFE, 20° PER at side for first 3 weeks  Restrict motion to 135° PFE, 45° PER at side for 3-6 weeks  Scapular exercises: Scapular elevation, depression, protraction and retraction  Hand and wrist: AROM exercises  Elbow: Avoid active ROM of elbow for first 8 weeks-protect the repair (conjoint tendon attached to bone piece)  Submaximal isometrics for rotator cuff in sling starting at 6 weeks      Phase II (Active range of motion) Weeks 6-12    Precautions:  Avoid lifting heavy objects with operative arm  AVOID provocative position: Abduction and external rotation of operative shoulder  NO Upper Body Ergometer, Handweights, Body Blade or Therabands  NO end range stretching  Avoid activities that place a lot of stress on anterior capsulolabral structures: Push ups,  press, pec flys, bench press    Exercises guidelines  Start AROM and increase PROM  Restrict passive motion to 150° PFF/ 45° PER at side /90 degree abduction by 8 weeks and advance to full motion by 12 weeks  Restrict active motion to 110° by 8 weeks and 150° by 10 weeks and full active ROM by 12 weeks  Strengthening (isometrics/light bands) within AROM limitations and arm at side initially and slowly advance to horizontal abduction exercises  Strengthening scapular stabilizers (Latissimus dorsi, Trapezius, Rhomboids)    Phase III (Advanced Strengthening phase) 3 months and beyond      Precautions  Prevent reinjury  Avoid contact sports till 6 months    Exercise Guidelines    Full AROM, end range stretching  Only do strengthening 3x/week to avoid rotator cuff  tendonitis  Begin eccentrically resisted motions, plyometrics, proprioception and closed chain exercises at 12 weeks.  Begin sports related rehab at 3-4 months, including advanced conditioning  Interval sports program at 4 months (return to golf, tennis, basketball, volleyball)  Return to throwing at 4 ½ months  Throw from pitcher's mound at 6 months     Modalities  Heat and Ice  Ultrasound    Iontophoresis    Phonophoresis  Therapists' discretion  TENS  Trigger point massage    Evaluation and others  Teach home exercise program

## 2025-01-27 ENCOUNTER — OFFICE VISIT (OUTPATIENT)
Dept: FAMILY MEDICINE CLINIC | Facility: CLINIC | Age: 40
End: 2025-01-27
Payer: COMMERCIAL

## 2025-01-27 VITALS
HEIGHT: 71 IN | SYSTOLIC BLOOD PRESSURE: 128 MMHG | DIASTOLIC BLOOD PRESSURE: 84 MMHG | WEIGHT: 248 LBS | BODY MASS INDEX: 34.72 KG/M2 | OXYGEN SATURATION: 97 % | TEMPERATURE: 96.8 F | RESPIRATION RATE: 20 BRPM | HEART RATE: 105 BPM

## 2025-01-27 DIAGNOSIS — F41.8 MIXED ANXIETY AND DEPRESSIVE DISORDER: ICD-10-CM

## 2025-01-27 DIAGNOSIS — K21.9 GASTROESOPHAGEAL REFLUX DISEASE, UNSPECIFIED WHETHER ESOPHAGITIS PRESENT: ICD-10-CM

## 2025-01-27 DIAGNOSIS — J06.9 ACUTE URI: Primary | ICD-10-CM

## 2025-01-27 DIAGNOSIS — Z98.890 S/P SHOULDER SURGERY: ICD-10-CM

## 2025-01-27 DIAGNOSIS — F17.210 CIGARETTE NICOTINE DEPENDENCE WITHOUT COMPLICATION: ICD-10-CM

## 2025-01-27 DIAGNOSIS — I10 PRIMARY HYPERTENSION: ICD-10-CM

## 2025-01-27 PROCEDURE — 99214 OFFICE O/P EST MOD 30 MIN: CPT | Performed by: FAMILY MEDICINE

## 2025-01-27 RX ORDER — AZITHROMYCIN 250 MG/1
TABLET, FILM COATED ORAL
Qty: 6 TABLET | Refills: 0 | Status: SHIPPED | OUTPATIENT
Start: 2025-01-27 | End: 2025-01-31

## 2025-01-27 RX ORDER — AMLODIPINE BESYLATE 5 MG/1
5 TABLET ORAL DAILY
Qty: 30 TABLET | Refills: 5 | Status: SHIPPED | OUTPATIENT
Start: 2025-01-27

## 2025-01-27 NOTE — PROGRESS NOTES
Name: Juanpablo Amaya Jr.      : 1985      MRN: [id number]  Encounter Provider: Chris Martinez MD  Encounter Date: 2025   Encounter department: [facility] MEDICINE  :  Assessment & Plan  Acute URI  Patient has had symptoms for past few days and getting worse. Will start antibiotics and patient to take mucinex twice a day. Call if no better.   Orders:  •  azithromycin (Zithromax) 250 mg tablet; Take 2 tablets (500 mg total) by mouth daily for 1 day, THEN 1 tablet (250 mg total) daily for 4 days.    Primary hypertension  Blood pressure ok. Will change lisinopril 20 mg daily to amlodipne 5 mg daily due to chronic cough. Pt advised to continue low Na diet and to exercise on a regular basis.   Orders:  •  amLODIPine (NORVASC) 5 mg tablet; Take 1 tablet (5 mg total) by mouth daily    Gastroesophageal reflux disease, unspecified whether esophagitis present  Stable. Continue omeprazole 40 mg every other day and GERD diet.        Mixed anxiety and depressive disorder  Stable. Continue escitalopram 20 mg daily. Patient sees therapist weekly.        Cigarette nicotine dependence without complication  Patient quit 4-5 weeks ago. Takes chantix twice a day.         Depression Screening and Follow-up Plan: Patient was screened for depression during today's encounter. They screened negative with a PHQ-9 score of 1.      History of Present Illness   Patient here for cough for past 4 days and worse when laying down. Hard to sleep. Has yellow mucus and sinus congestion. No fevers or chills. No aches or fatigue. Has sore throat. No nausea, vomiting, or diarrhea. Has wheezing at times. Not taking any medications for it. Patient also had left shoulder surgery on 25 and has left arm in sling. Started physical therapy last week.       Review of Systems   Constitutional:  Negative for chills, fatigue and fever.   HENT:  Positive for congestion. Negative for ear pain, postnasal drip, rhinorrhea, sinus  "pressure, sinus pain, sore throat and trouble swallowing.    Respiratory:  Positive for cough. Negative for chest tightness, shortness of breath and wheezing.    Cardiovascular:  Negative for chest pain.   Gastrointestinal:  Negative for abdominal pain, diarrhea, nausea and vomiting.   Musculoskeletal:  Positive for arthralgias.   Skin:  Negative for rash.   Neurological:  Negative for dizziness and headaches.       Objective   /84 (BP Location: Left arm, Patient Position: Sitting, Cuff Size: Standard)   Pulse 105   Temp (!) 96.8 °F (36 °C) (Tympanic)   Resp 20   Ht 5' 11\" (1.803 m)   Wt 112 kg (248 lb)   SpO2 97%   BMI 34.59 kg/m²      Physical Exam  Constitutional:       General: He is not in acute distress.     Appearance: Normal appearance. He is obese. He is not ill-appearing.   HENT:      Right Ear: Tympanic membrane, ear canal and external ear normal.      Left Ear: Tympanic membrane, ear canal and external ear normal.      Nose: Congestion present. No rhinorrhea.      Mouth/Throat:      Mouth: Mucous membranes are moist.      Pharynx: Oropharynx is clear. No posterior oropharyngeal erythema.   Cardiovascular:      Rate and Rhythm: Normal rate and regular rhythm.      Heart sounds: Normal heart sounds. No murmur heard.  Pulmonary:      Effort: Pulmonary effort is normal.      Breath sounds: Normal breath sounds. No wheezing or rhonchi.   Musculoskeletal:      Comments: Left arm in sling.    Lymphadenopathy:      Cervical: No cervical adenopathy.   Neurological:      Mental Status: He is alert.         "

## 2025-01-27 NOTE — ASSESSMENT & PLAN NOTE
Blood pressure ok. Will change lisinopril 20 mg daily to amlodipne 5 mg daily due to chronic cough. Pt advised to continue low Na diet and to exercise on a regular basis.   Orders:  •  amLODIPine (NORVASC) 5 mg tablet; Take 1 tablet (5 mg total) by mouth daily

## 2025-01-27 NOTE — ASSESSMENT & PLAN NOTE
Patient has had symptoms for past few days and getting worse. Will start antibiotics and patient to take mucinex twice a day. Call if no better.   Orders:  •  azithromycin (Zithromax) 250 mg tablet; Take 2 tablets (500 mg total) by mouth daily for 1 day, THEN 1 tablet (250 mg total) daily for 4 days.

## 2025-01-28 RX ORDER — ASPIRIN 81 MG
TABLET,CHEWABLE ORAL
Qty: 28 TABLET | Refills: 0 | OUTPATIENT
Start: 2025-01-28

## 2025-02-05 ENCOUNTER — APPOINTMENT (OUTPATIENT)
Dept: PHYSICAL THERAPY | Facility: CLINIC | Age: 40
End: 2025-02-05
Payer: COMMERCIAL

## 2025-02-10 ENCOUNTER — OFFICE VISIT (OUTPATIENT)
Dept: OBGYN CLINIC | Facility: CLINIC | Age: 40
End: 2025-02-10

## 2025-02-10 ENCOUNTER — HOSPITAL ENCOUNTER (OUTPATIENT)
Dept: RADIOLOGY | Facility: HOSPITAL | Age: 40
Discharge: HOME | End: 2025-02-10
Attending: STUDENT IN AN ORGANIZED HEALTH CARE EDUCATION/TRAINING PROGRAM
Payer: COMMERCIAL

## 2025-02-10 VITALS — HEIGHT: 71 IN | WEIGHT: 248 LBS | BODY MASS INDEX: 34.72 KG/M2

## 2025-02-10 DIAGNOSIS — Z98.890 STATUS POST SURGERY: ICD-10-CM

## 2025-02-10 DIAGNOSIS — Z98.890 STATUS POST SURGERY: Primary | ICD-10-CM

## 2025-02-10 PROCEDURE — 73030 X-RAY EXAM OF SHOULDER: CPT

## 2025-02-10 PROCEDURE — 99024 POSTOP FOLLOW-UP VISIT: CPT | Performed by: STUDENT IN AN ORGANIZED HEALTH CARE EDUCATION/TRAINING PROGRAM

## 2025-02-10 NOTE — PROGRESS NOTES
Ortho Sports Medicine Shoulder Follow Up Visit     Assesment:   39 y.o. male left shoulder 5 weeks and 4 days status post Latarjet shoulder reconstruction of the left shoulder, doing well, progressing well post-operatively.    Plan:  Rudolph continues to do well in the acute post-operative period. We reviewed his imaging today. His ROM is progressing as expected. We reviewed his post-operative protocol again and discussed that he may now wean his sling off over the next few days. I did emphasize the continued adherence to the protocol given ongoing graft healing.We discussed beginning AROM in approximately 3 days (at the 6 week post-op naomi) and increasing his PROM as well. We discussed wound care and the use of ice / NSAIDS as needed for persistent pain and inflammation, especially after physical therapy sessions. Rudolph voiced understanding of this. He will follow up in 6 weeks for reevaluation of the left shoulder with x-rays. All of his questions were answered in the office today.     With regards to work, he is not yet cleared to return to work at this time. Will plan for return to work at 4 months post-operatively.    Follow up:    Return in about 6 weeks (around 3/24/2025). Left shoulder x-rays (AP, Grashey, Scapular Y, axillary lateral)      Chief Complaint   Patient presents with    Left Shoulder - Post-op         History of Present Illness:    Rudolph returns for follow up of the left shoulder, 5 weeks and 4 days postop from latarjet reconstruction of the left shoulder. Patient states he is doing well in his acute post operative period. Notes some mild pain over anterior incisional site but otherwise denies pain. He had a viral URI and missed two PT sessions, however he is scheduled for PT tomorrow and is looking forward to getting back to PT, and he has been working on his ROM exercises at home. He denies breakdown, erythema, or drainage of incisional sites. He denies numbness / tingling. No new injuries. He  "states he continues to be on Chantix and is not using any tobacco products.      Review of systems: ROS is negative other than that noted in the HPI.  Constitutional: Negative for fatigue and fever.      Physical Exam:    Height 5' 11\" (1.803 m), weight 112 kg (248 lb).    General/Constitutional: NAD, well developed, well nourished  HENT: Normocephalic, atraumatic  CV: Intact distal pulses, regular rate  Resp: No respiratory distress or labored breathing  GI: Soft and non-tender   Lymphatic: No lymphadenopathy palpated  Neuro: Alert and Oriented x 3, no focal deficits  Psych: Normal mood, normal affect, normal judgement, normal behavior  Skin: Warm, dry, no rashes, no erythema      Left Shoulder focused exam:  Incisions healed well without evidence of breakdown or erythema   Mild TTP over anterior incision  PROM-  Forward flexion to 110, ER to 15, abduction to 40  Full sensation to digits. Full ROM of digits   Full ROM of the wrist   Digits warm and well perfused     UE NV Exam: +2 Radial pulses bilaterally  Sensation intact to light touch C5-T1 bilaterally  5/5 deltoid, biceps, triceps, EPL. FPL. IO muscles      Shoulder Imaging    I personally reviewed and interpreted 3 views of the left shoulder including AP, Grashey, and Scapular Y obtained in the office today which demonstrate s/p Latarjet coracoid transfer with intact hardware and bone block. No evidence of hardware failure and bone block appears to be in same position compared to prior x-rays obtained on 1/13/25 with no evidence of migration. Bone block appears well apposed to the glenoid on scapular Y view. I reviewed this in detail with the patient. I reviewed the radiology report and agree with their findings.        "

## 2025-02-11 ENCOUNTER — OFFICE VISIT (OUTPATIENT)
Dept: PHYSICAL THERAPY | Facility: CLINIC | Age: 40
End: 2025-02-11
Payer: COMMERCIAL

## 2025-02-11 DIAGNOSIS — M24.412 RECURRENT ANTERIOR DISLOCATION OF LEFT SHOULDER: Primary | ICD-10-CM

## 2025-02-11 PROCEDURE — 97140 MANUAL THERAPY 1/> REGIONS: CPT

## 2025-02-11 PROCEDURE — 97110 THERAPEUTIC EXERCISES: CPT

## 2025-02-11 NOTE — PROGRESS NOTES
"Daily Note     Today's date: 2025  Patient name: Juanpablo Amaya Jr.  : 1985  MRN: [id number]  Referring provider: Reji Perez MD  Dx: No diagnosis found.    Start Time: 828  Stop Time: 0900  Total time in clinic (min): 32 minutes    Subjective: \"got out of my sling yesterday      Objective: See treatment diary below      Assessment: Tolerated treatment well. Patient demonstrated fatigue post treatment, exhibited good technique with therapeutic exercises, and would benefit from continued PT.  Followed gentle PROM per protocol with active hand and wrist  movement.  Patient reports avoiding active range of shoulder and elbow accordingly.  Most discomfort with returning from elevation with shoulder PROM.      Plan: Continue per plan of care.         POC Expires Auth Status Start Date Expiration Date PT Visit Limit           Date   2025    Used        Remaining           Diagnosis: Left Laterjet (op 25)   Precautions: per protocol, NO ER in phase 1   Primary Goals: pain movement   *asterisks by exercise = given for HEP   Manuals  2025        <Wk6 , ER 45  JW 10 min   DO FOTO                                   There Ex        Wrist AROM* x10 Tb resisted flexion x20  Flexbar x10 palm up and down      C/s stretch* x10       Supine ER/ABD CAne  5\"x10      Scap stab noninvolved arm  Rows x10  Shrugs x10                                              Neuro Re-Ed                                                                                                Patient education Diagnosis, prognosis, activity modification        Re-evaluation              Ther Act                                         Modalities                                                    PT protocol for Latarjet Surgery (Coracoid Process Transfer)    Juanpablo Amaya Jr.  Diagnosis: S/P left shoulder Latarjet reconstruction (coracoid transfer and anterior capsulorraphy)  DOS: 25  Frequency: 2 " times/week  Duration: 12 Weeks    Phase I (Protection phase): Weeks 1-6    Precautions:  AVOID lifting objects with operative arm  AVOID provocative position: combined abduction and external rotation, combined extension and external rotation  Follow the ROM restrictions with no end range passive stretching  NO Upper Body Ergometer, Handweights, Body Blade or Therabands    Immobilization  Sling for 5 weeks. Wean off after 5 weeks  Wear sling at all times including night except when doing therapy, shower, or changing    Exercises guidelines  PROM to start a week after surgery  Restrict motion to 90° PFE, 20° PER at side for first 3 weeks  Restrict motion to 135° PFE, 45° PER at side for 3-6 weeks  Scapular exercises: Scapular elevation, depression, protraction and retraction  Hand and wrist: AROM exercises  Elbow: Avoid active ROM of elbow for first 8 weeks-protect the repair (conjoint tendon attached to bone piece)  Submaximal isometrics for rotator cuff in sling starting at 6 weeks      Phase II (Active range of motion) Weeks 6-12    Precautions:  Avoid lifting heavy objects with operative arm  AVOID provocative position: Abduction and external rotation of operative shoulder  NO Upper Body Ergometer, Handweights, Body Blade or Therabands  NO end range stretching  Avoid activities that place a lot of stress on anterior capsulolabral structures: Push ups,  press, pec flys, bench press    Exercises guidelines  Start AROM and increase PROM  Restrict passive motion to 150° PFF/ 45° PER at side /90 degree abduction by 8 weeks and advance to full motion by 12 weeks  Restrict active motion to 110° by 8 weeks and 150° by 10 weeks and full active ROM by 12 weeks  Strengthening (isometrics/light bands) within AROM limitations and arm at side initially and slowly advance to horizontal abduction exercises  Strengthening scapular stabilizers (Latissimus dorsi, Trapezius, Rhomboids)    Phase III (Advanced Strengthening  phase) 3 months and beyond      Precautions  Prevent reinjury  Avoid contact sports till 6 months    Exercise Guidelines    Full AROM, end range stretching  Only do strengthening 3x/week to avoid rotator cuff tendonitis  Begin eccentrically resisted motions, plyometrics, proprioception and closed chain exercises at 12 weeks.  Begin sports related rehab at 3-4 months, including advanced conditioning  Interval sports program at 4 months (return to golf, tennis, basketball, volleyball)  Return to throwing at 4 ½ months  Throw from pitcher's mound at 6 months     Modalities  Heat and Ice  Ultrasound    Iontophoresis    Phonophoresis  Therapists' discretion  TENS  Trigger point massage    Evaluation and others  Teach home exercise program

## 2025-02-18 ENCOUNTER — APPOINTMENT (OUTPATIENT)
Dept: PHYSICAL THERAPY | Facility: CLINIC | Age: 40
End: 2025-02-18
Payer: COMMERCIAL

## 2025-02-18 NOTE — PROGRESS NOTES
Discharge    Today's date: 2025  Patient name: Juanpablo Amaya Jr.  : 1985  MRN: [id number]  Referring provider: Reji Perez MD  Dx:   Encounter Diagnosis     ICD-10-CM    1. Recurrent anterior dislocation of left shoulder  M24.412             Patient called to cancel all appointments, he lost insurance and will be re-evaluated when financially able to continue therapy.

## 2025-02-21 ENCOUNTER — APPOINTMENT (OUTPATIENT)
Dept: PHYSICAL THERAPY | Facility: CLINIC | Age: 40
End: 2025-02-21
Payer: COMMERCIAL

## 2025-02-21 ENCOUNTER — OFFICE VISIT (OUTPATIENT)
Dept: PHYSICAL THERAPY | Facility: CLINIC | Age: 40
End: 2025-02-21
Payer: COMMERCIAL

## 2025-02-21 DIAGNOSIS — M24.412 RECURRENT ANTERIOR DISLOCATION OF LEFT SHOULDER: Primary | ICD-10-CM

## 2025-02-21 PROCEDURE — 97110 THERAPEUTIC EXERCISES: CPT

## 2025-02-21 PROCEDURE — 97140 MANUAL THERAPY 1/> REGIONS: CPT

## 2025-02-21 PROCEDURE — 97530 THERAPEUTIC ACTIVITIES: CPT

## 2025-02-21 NOTE — PROGRESS NOTES
"Daily Note     Today's date: 2025  Patient name: Juanpablo Amaya Jr.  : 1985  MRN: [id number]  Referring provider: Reji Perez MD  Dx:   Encounter Diagnosis     ICD-10-CM    1. Recurrent anterior dislocation of left shoulder  M24.412           Start Time: 1115  Stop Time: 1200  Total time in clinic (min): 45 minutes    Subjective: \"I think my right arm is doing most of the work\"  Report during rowing activity.      Objective: See treatment diary below      Assessment: Tolerated treatment well. Patient demonstrated fatigue post treatment, exhibited good technique with therapeutic exercises, and would benefit from continued PT.  Initiated 6-12 week strengthening light activity and AROM with increased PROM (see objective limitations below).  Added BW and light theraband resisted flexion and abduction up to 90 degrees.  Stiff and apprehensive with manuals.      Plan: Continue per plan of care.         POC Expires Auth Status Start Date Expiration Date PT Visit Limit           Date   2025    Used        Remaining           Diagnosis: Left Laterjet (op 25)   Precautions: per protocol, NO ER in phase 1   Primary Goals: pain movement   *asterisks by exercise = given for HEP   Manuals  2025       <Wk6 , ER 45  JW 10 min JW Ffe 150 deg ER 45  DO FOTO                                   There Ex        Wrist AROM* x10 Tb resisted flexion x20  Flexbar x10 palm up and down      C/s stretch* x10       Supine ER/ABD CAne  5\"x10      Scap stab noninvolved arm  Rows x10  Shrugs x10 Rows 15#  SAPD 15#  3x30     SL ER   BW 2x30  3# x5 (too heavy)     Scaption   Ant delt 2x20  Mid delt 2x20    [place] Tb                             Neuro Re-Ed                                                                                                Patient education Diagnosis, prognosis, activity modification        Re-evaluation              Ther Act                                       "   Modalities                                                    PT protocol for Latarjet Surgery (Coracoid Process Transfer)    Juanpablo Amaya Jr.  Diagnosis: S/P left shoulder Latarjet reconstruction (coracoid transfer and anterior capsulorraphy)  DOS: 1/2/25  Frequency: 2 times/week  Duration: 12 Weeks    Phase I (Protection phase): Weeks 1-6    Precautions:  AVOID lifting objects with operative arm  AVOID provocative position: combined abduction and external rotation, combined extension and external rotation  Follow the ROM restrictions with no end range passive stretching  NO Upper Body Ergometer, Handweights, Body Blade or Therabands    Immobilization  Sling for 5 weeks. Wean off after 5 weeks  Wear sling at all times including night except when doing therapy, shower, or changing    Exercises guidelines  PROM to start a week after surgery  Restrict motion to 90° PFE, 20° PER at side for first 3 weeks  Restrict motion to 135° PFE, 45° PER at side for 3-6 weeks  Scapular exercises: Scapular elevation, depression, protraction and retraction  Hand and wrist: AROM exercises  Elbow: Avoid active ROM of elbow for first 8 weeks-protect the repair (conjoint tendon attached to bone piece)  Submaximal isometrics for rotator cuff in sling starting at 6 weeks      Phase II (Active range of motion) Weeks 6-12    Precautions:  Avoid lifting heavy objects with operative arm  AVOID provocative position: Abduction and external rotation of operative shoulder  NO Upper Body Ergometer, Handweights, Body Blade or Therabands  NO end range stretching  Avoid activities that place a lot of stress on anterior capsulolabral structures: Push ups,  press, pec flys, bench press    Exercises guidelines  Start AROM and increase PROM  Restrict passive motion to 150° PFF/ 45° PER at side /90 degree abduction by 8 weeks and advance to full motion by 12 weeks  Restrict active motion to 110° by 8 weeks and 150° by 10 weeks and full active  ROM by 12 weeks  Strengthening (isometrics/light bands) within AROM limitations and arm at side initially and slowly advance to horizontal abduction exercises  Strengthening scapular stabilizers (Latissimus dorsi, Trapezius, Rhomboids)    Phase III (Advanced Strengthening phase) 3 months and beyond      Precautions  Prevent reinjury  Avoid contact sports till 6 months    Exercise Guidelines    Full AROM, end range stretching  Only do strengthening 3x/week to avoid rotator cuff tendonitis  Begin eccentrically resisted motions, plyometrics, proprioception and closed chain exercises at 12 weeks.  Begin sports related rehab at 3-4 months, including advanced conditioning  Interval sports program at 4 months (return to golf, tennis, basketball, volleyball)  Return to throwing at 4 ½ months  Throw from pitcher's mound at 6 months     Modalities  Heat and Ice  Ultrasound    Iontophoresis    Phonophoresis  Therapists' discretion  TENS  Trigger point massage    Evaluation and others  Teach home exercise program

## 2025-02-24 ENCOUNTER — OFFICE VISIT (OUTPATIENT)
Dept: PHYSICAL THERAPY | Facility: CLINIC | Age: 40
End: 2025-02-24
Payer: COMMERCIAL

## 2025-02-24 DIAGNOSIS — M24.412 RECURRENT ANTERIOR DISLOCATION OF LEFT SHOULDER: Primary | ICD-10-CM

## 2025-02-24 PROCEDURE — 97140 MANUAL THERAPY 1/> REGIONS: CPT

## 2025-02-24 PROCEDURE — 97110 THERAPEUTIC EXERCISES: CPT

## 2025-02-24 NOTE — PROGRESS NOTES
"Daily Note     Today's date: 2025  Patient name: Juanpablo Amaya Jr.  : 1985  MRN: [id number]  Referring provider: Reji Perez MD  Dx:   Encounter Diagnosis     ICD-10-CM    1. Recurrent anterior dislocation of left shoulder  M24.412           Start Time: 1830  Stop Time: 1900  Total time in clinic (min): 30 minutes    Subjective: \"I've been sore as heck from those last exercises, but its better today\"      Objective: See treatment diary below      Assessment: Tolerated treatment well. Patient demonstrated fatigue post treatment, exhibited good technique with therapeutic exercises, and would benefit from continued PT.  Implemented AROM prone should motion with light resistance band with PROM per protocol.        Plan: Continue per plan of care.         POC Expires Auth Status Start Date Expiration Date PT Visit Limit           Date   2025    Used        Remaining           Diagnosis: Left Laterjet (op 25)   Precautions: per protocol, NO ER in phase 1   Primary Goals: pain movement   *asterisks by exercise = given for HEP   Manuals  2025    <Wk6 , ER 45  JW 10 min JW Ffe 150 deg ER 45 JW Ffe 150 deg ER 45  X8 min  DO FOTO                                   There Ex        Wrist AROM* x10 Tb resisted flexion x20  Flexbar x10 palm up and down      C/s stretch* x10       Supine ER/ABD CAne  5\"x10      Prone YTI    X15 each  Row, lats 4#    Scap stab noninvolved arm  Rows x10  Shrugs x10 Rows 15#  SAPD 15#  3x30     SL ER   BW 2x30  3# x5 (too heavy) BW 2x30      Scaption   Ant delt 2x20  Mid delt 2x20    [place] Tb     No money    2x20    Tb punches    x20            Neuro Re-Ed                                                                                                Patient education Diagnosis, prognosis, activity modification        Re-evaluation              Ther Act                                         Modalities                                 "                    PT protocol for Latarjet Surgery (Coracoid Process Transfer)    Juanpablo Amaya Jr.  Diagnosis: S/P left shoulder Latarjet reconstruction (coracoid transfer and anterior capsulorraphy)  DOS: 1/2/25  Frequency: 2 times/week  Duration: 12 Weeks    Phase I (Protection phase): Weeks 1-6    Precautions:  AVOID lifting objects with operative arm  AVOID provocative position: combined abduction and external rotation, combined extension and external rotation  Follow the ROM restrictions with no end range passive stretching  NO Upper Body Ergometer, Handweights, Body Blade or Therabands    Immobilization  Sling for 5 weeks. Wean off after 5 weeks  Wear sling at all times including night except when doing therapy, shower, or changing    Exercises guidelines  PROM to start a week after surgery  Restrict motion to 90° PFE, 20° PER at side for first 3 weeks  Restrict motion to 135° PFE, 45° PER at side for 3-6 weeks  Scapular exercises: Scapular elevation, depression, protraction and retraction  Hand and wrist: AROM exercises  Elbow: Avoid active ROM of elbow for first 8 weeks-protect the repair (conjoint tendon attached to bone piece)  Submaximal isometrics for rotator cuff in sling starting at 6 weeks      Phase II (Active range of motion) Weeks 6-12    Precautions:  Avoid lifting heavy objects with operative arm  AVOID provocative position: Abduction and external rotation of operative shoulder  NO Upper Body Ergometer, Handweights, Body Blade or Therabands  NO end range stretching  Avoid activities that place a lot of stress on anterior capsulolabral structures: Push ups,  press, pec flys, bench press    Exercises guidelines  Start AROM and increase PROM  Restrict passive motion to 150° PFF/ 45° PER at side /90 degree abduction by 8 weeks and advance to full motion by 12 weeks  Restrict active motion to 110° by 8 weeks and 150° by 10 weeks and full active ROM by 12 weeks  Strengthening  (isometrics/light bands) within AROM limitations and arm at side initially and slowly advance to horizontal abduction exercises  Strengthening scapular stabilizers (Latissimus dorsi, Trapezius, Rhomboids)    Phase III (Advanced Strengthening phase) 3 months and beyond      Precautions  Prevent reinjury  Avoid contact sports till 6 months    Exercise Guidelines    Full AROM, end range stretching  Only do strengthening 3x/week to avoid rotator cuff tendonitis  Begin eccentrically resisted motions, plyometrics, proprioception and closed chain exercises at 12 weeks.  Begin sports related rehab at 3-4 months, including advanced conditioning  Interval sports program at 4 months (return to golf, tennis, basketball, volleyball)  Return to throwing at 4 ½ months  Throw from pitcher's mound at 6 months     Modalities  Heat and Ice  Ultrasound    Iontophoresis    Phonophoresis  Therapists' discretion  TENS  Trigger point massage    Evaluation and others  Teach home exercise program

## 2025-02-25 ENCOUNTER — TELEPHONE (OUTPATIENT)
Dept: INTERNAL MEDICINE CLINIC | Facility: CLINIC | Age: 40
End: 2025-02-25

## 2025-02-25 NOTE — TELEPHONE ENCOUNTER
Called and spoke with patient and asked him to change his PCP with his insurance.  He said he would take care of that today.

## 2025-02-26 ENCOUNTER — OFFICE VISIT (OUTPATIENT)
Dept: PHYSICAL THERAPY | Facility: CLINIC | Age: 40
End: 2025-02-26
Payer: COMMERCIAL

## 2025-02-26 ENCOUNTER — OFFICE VISIT (OUTPATIENT)
Dept: FAMILY MEDICINE CLINIC | Facility: CLINIC | Age: 40
End: 2025-02-26
Payer: COMMERCIAL

## 2025-02-26 VITALS
DIASTOLIC BLOOD PRESSURE: 88 MMHG | BODY MASS INDEX: 33.88 KG/M2 | OXYGEN SATURATION: 98 % | WEIGHT: 242 LBS | RESPIRATION RATE: 16 BRPM | HEART RATE: 84 BPM | SYSTOLIC BLOOD PRESSURE: 130 MMHG | HEIGHT: 71 IN

## 2025-02-26 DIAGNOSIS — F41.8 MIXED ANXIETY AND DEPRESSIVE DISORDER: ICD-10-CM

## 2025-02-26 DIAGNOSIS — M24.412 RECURRENT ANTERIOR DISLOCATION OF LEFT SHOULDER: Primary | ICD-10-CM

## 2025-02-26 DIAGNOSIS — I10 PRIMARY HYPERTENSION: Primary | ICD-10-CM

## 2025-02-26 DIAGNOSIS — K21.9 GASTROESOPHAGEAL REFLUX DISEASE, UNSPECIFIED WHETHER ESOPHAGITIS PRESENT: ICD-10-CM

## 2025-02-26 PROBLEM — J06.9 ACUTE URI: Status: RESOLVED | Noted: 2025-01-27 | Resolved: 2025-02-26

## 2025-02-26 PROCEDURE — 97140 MANUAL THERAPY 1/> REGIONS: CPT

## 2025-02-26 PROCEDURE — 99213 OFFICE O/P EST LOW 20 MIN: CPT | Performed by: FAMILY MEDICINE

## 2025-02-26 PROCEDURE — 97530 THERAPEUTIC ACTIVITIES: CPT

## 2025-02-26 PROCEDURE — 97110 THERAPEUTIC EXERCISES: CPT

## 2025-02-26 RX ORDER — HYDROCHLOROTHIAZIDE 12.5 MG/1
12.5 TABLET ORAL DAILY
Qty: 30 TABLET | Refills: 5 | Status: SHIPPED | OUTPATIENT
Start: 2025-02-26

## 2025-02-26 NOTE — ASSESSMENT & PLAN NOTE
Blood pressure a little high. Will add HCTZ 12.5 mg daily.  Continue amlodipine 5 mg daily. Pt advised to continue low Na diet and to exercise on a regular basis.   Orders:  •  hydroCHLOROthiazide 12.5 mg tablet; Take 1 tablet (12.5 mg total) by mouth daily

## 2025-02-26 NOTE — PROGRESS NOTES
"Daily Note     Today's date: 2025  Patient name: Juanpablo Amaya Jr.  : 1985  MRN: [id number]  Referring provider: eRji Perez MD  Dx:   Encounter Diagnosis     ICD-10-CM    1. Recurrent anterior dislocation of left shoulder  M24.412           Start Time: 1645  Stop Time: 1730  Total time in clinic (min): 45 minutes    Subjective: Patient reports being challenged with exercises and no increase in symptoms since last visit besides soreness which resolved.      Objective: See treatment diary below      Assessment: Tolerated treatment well.  Pleasantly challenged, with there-ex today.   Patient demonstrated fatigue post treatment, exhibited good technique with therapeutic exercises, and would benefit from continued PT      Plan: Continue per plan of care.         POC Expires Auth Status Start Date Expiration Date PT Visit Limit           Date   2025    Used        Remaining           Diagnosis: Left Laterjet (op 25)   Precautions: per protocol, NO ER in phase 1   Primary Goals: pain movement   *asterisks by exercise = given for HEP   Manuals  2025   <Wk6 , ER 45  JW 10 min JW Ffe 150 deg ER 45 JW Ffe 150 deg ER 45  X8 min  DO FOTO                                   There Ex        Wrist AROM* x10 Tb resisted flexion x20  Flexbar x10 palm up and down      C/s stretch* x10       Supine ER/ABD CAne  5\"x10      Prone YTI    X15 each  Row, lats 4# X15 each  Row, lats 5#   Scap stab noninvolved arm  Rows x10  Shrugs x10 Rows 15#  SAPD 15#  3x30     SL FLXION     PTB 2x10   SL ABD     PTB 2x10   SL ER   BW 2x30  3# x5 (too heavy) BW 2x30   BW 2x10  PTB 2x10   Scaption   Ant delt 2x20  Mid delt 2x20    [place] Tb     No money    2x20 2x20   Tb punches    x20 x20           Neuro Re-Ed                                                                                                Patient education Diagnosis, prognosis, activity modification        Re-evaluation "              Ther Act                                         Modalities                                                    PT protocol for Latarjet Surgery (Coracoid Process Transfer)    Junapablo Amaya Jr.  Diagnosis: S/P left shoulder Latarjet reconstruction (coracoid transfer and anterior capsulorraphy)  DOS: 1/2/25  Frequency: 2 times/week  Duration: 12 Weeks    Phase I (Protection phase): Weeks 1-6    Precautions:  AVOID lifting objects with operative arm  AVOID provocative position: combined abduction and external rotation, combined extension and external rotation  Follow the ROM restrictions with no end range passive stretching  NO Upper Body Ergometer, Handweights, Body Blade or Therabands    Immobilization  Sling for 5 weeks. Wean off after 5 weeks  Wear sling at all times including night except when doing therapy, shower, or changing    Exercises guidelines  PROM to start a week after surgery  Restrict motion to 90° PFE, 20° PER at side for first 3 weeks  Restrict motion to 135° PFE, 45° PER at side for 3-6 weeks  Scapular exercises: Scapular elevation, depression, protraction and retraction  Hand and wrist: AROM exercises  Elbow: Avoid active ROM of elbow for first 8 weeks-protect the repair (conjoint tendon attached to bone piece)  Submaximal isometrics for rotator cuff in sling starting at 6 weeks      Phase II (Active range of motion) Weeks 6-12    Precautions:  Avoid lifting heavy objects with operative arm  AVOID provocative position: Abduction and external rotation of operative shoulder  NO Upper Body Ergometer, Handweights, Body Blade or Therabands  NO end range stretching  Avoid activities that place a lot of stress on anterior capsulolabral structures: Push ups,  press, pec flys, bench press    Exercises guidelines  Start AROM and increase PROM  Restrict passive motion to 150° PFF/ 45° PER at side /90 degree abduction by 8 weeks and advance to full motion by 12 weeks  Restrict active  motion to 110° by 8 weeks and 150° by 10 weeks and full active ROM by 12 weeks  Strengthening (isometrics/light bands) within AROM limitations and arm at side initially and slowly advance to horizontal abduction exercises  Strengthening scapular stabilizers (Latissimus dorsi, Trapezius, Rhomboids)    Phase III (Advanced Strengthening phase) 3 months and beyond      Precautions  Prevent reinjury  Avoid contact sports till 6 months    Exercise Guidelines    Full AROM, end range stretching  Only do strengthening 3x/week to avoid rotator cuff tendonitis  Begin eccentrically resisted motions, plyometrics, proprioception and closed chain exercises at 12 weeks.  Begin sports related rehab at 3-4 months, including advanced conditioning  Interval sports program at 4 months (return to golf, tennis, basketball, volleyball)  Return to throwing at 4 ½ months  Throw from pitcher's mound at 6 months     Modalities  Heat and Ice  Ultrasound    Iontophoresis    Phonophoresis  Therapists' discretion  TENS  Trigger point massage    Evaluation and others  Teach home exercise program

## 2025-02-26 NOTE — PROGRESS NOTES
"Name: Juanpablo Amaya Jr.      : 1985      MRN: [id number]  Encounter Provider: Chris Martinez MD  Encounter Date: 2025   Encounter department: [facility] MEDICINE  :  Assessment & Plan  Primary hypertension  Blood pressure a little high. Will add HCTZ 12.5 mg daily.  Continue amlodipine 5 mg daily. Pt advised to continue low Na diet and to exercise on a regular basis.   Orders:  •  hydroCHLOROthiazide 12.5 mg tablet; Take 1 tablet (12.5 mg total) by mouth daily    Gastroesophageal reflux disease, unspecified whether esophagitis present  No recent symptoms. Continue omeprazole 40 mg every other day and GERD diet.        Mixed anxiety and depressive disorder  Stress level and mood ok. Continue escitalopram 20 mg daily. Patient sees therapist weekly.                 History of Present Illness   Patient here for follow-up Hypertension, GERD, Anxiety, Depression. Patient doing ok. No chest pain or shortness of breath. No headaches. No abdominal pain. Blood pressure has been higher on amlodipine but cough is better. Patient walks and follows low Na diet. No recent GERD. Mood and stress level ok. Sees therapist weekly.       Review of Systems   Constitutional:  Negative for activity change, appetite change and fatigue.   Respiratory:  Negative for chest tightness and shortness of breath.    Cardiovascular:  Negative for chest pain and palpitations.   Gastrointestinal:  Negative for abdominal pain, diarrhea, nausea and vomiting.   Neurological:  Negative for dizziness, tremors, light-headedness and headaches.   Psychiatric/Behavioral:  Negative for agitation, decreased concentration, dysphoric mood, self-injury, sleep disturbance and suicidal ideas. The patient is not nervous/anxious.        Objective   /88 (BP Location: Left arm, Patient Position: Sitting, Cuff Size: Large)   Pulse 84   Resp 16   Ht 5' 11\" (1.803 m)   Wt 110 kg (242 lb)   SpO2 98%   BMI 33.75 kg/m²      Physical " Exam  Vitals and nursing note reviewed.   Constitutional:       Appearance: Normal appearance. He is obese.   Neck:      Vascular: No carotid bruit.   Cardiovascular:      Rate and Rhythm: Normal rate and regular rhythm.      Heart sounds: Normal heart sounds. No murmur heard.  Pulmonary:      Effort: Pulmonary effort is normal.      Breath sounds: Normal breath sounds. No wheezing.   Musculoskeletal:      Cervical back: Normal range of motion and neck supple. No muscular tenderness.      Right lower leg: No edema.      Left lower leg: No edema.   Lymphadenopathy:      Cervical: No cervical adenopathy.   Neurological:      Mental Status: He is alert.   Psychiatric:         Mood and Affect: Mood normal.         Behavior: Behavior normal.         Thought Content: Thought content normal.         Judgment: Judgment normal.

## 2025-03-03 ENCOUNTER — TELEPHONE (OUTPATIENT)
Age: 40
End: 2025-03-03

## 2025-03-03 NOTE — TELEPHONE ENCOUNTER
Patient stated that insurance covers blood pressure monitor with cuff. He would like a scripts  sent to [facility].  Arielle Rodriguez

## 2025-03-04 ENCOUNTER — OFFICE VISIT (OUTPATIENT)
Dept: PHYSICAL THERAPY | Facility: CLINIC | Age: 40
End: 2025-03-04
Payer: COMMERCIAL

## 2025-03-04 DIAGNOSIS — S42.292A HILL SACHS DEFORMITY, LEFT: Primary | ICD-10-CM

## 2025-03-04 DIAGNOSIS — I10 PRIMARY HYPERTENSION: Primary | ICD-10-CM

## 2025-03-04 PROCEDURE — 97140 MANUAL THERAPY 1/> REGIONS: CPT

## 2025-03-04 PROCEDURE — 97530 THERAPEUTIC ACTIVITIES: CPT

## 2025-03-04 PROCEDURE — 97110 THERAPEUTIC EXERCISES: CPT

## 2025-03-04 RX ORDER — BLOOD PRESSURE TEST KIT
1 KIT MISCELLANEOUS DAILY
Qty: 1 KIT | Refills: 0 | Status: SHIPPED | OUTPATIENT
Start: 2025-03-04

## 2025-03-04 NOTE — PROGRESS NOTES
"Daily Note     Today's date: 3/4/2025  Patient name: Juanpablo Amaya Jr.  : 1985  MRN: [id number]  Referring provider: Reji Perez MD  Dx: No diagnosis found.    Start Time: 1400  Stop Time: 1445  Total time in clinic (min): 45 minutes    Subjective: Patient feels better and endorses HEP compliance and improvement.  He just feel achiness on the lateral aspect of his arm.      Objective: See treatment diary below      Assessment: Tolerated treatment well. Improving with delt activation and range of motion nicely as observed power increase.  Inc rhythmic initiation along with active range of motion today and tolerated nicely.Patient demonstrated fatigue post treatment, exhibited good technique with therapeutic exercises, and would benefit from continued PT      Plan: Continue per plan of care.         POC Expires Auth Status Start Date Expiration Date PT Visit Limit           Date   2025    Used        Remaining           Diagnosis: Left Laterjet (op 25)   Precautions: per protocol, NO ER in phase 1   Primary Goals: pain movement   *asterisks by exercise = given for HEP   Manuals 3/4 2025   2025   2/24 2/26   <Wk6 , ER 45  JW 10 min JW Ffe 150 deg ER 45 JW Ffe 150 deg ER 45  X8 min  DO FOTO   Wk 8 Progressing PROM to ful       PNF - isometrics 5 min                       There Ex        Wrist AROM*  Tb resisted flexion x20  Flexbar x10 palm up and down      C/s stretch*        Supine ER/ABD CAne  5\"x10      Prone YTI x30   X15 each  Row, lats 4# X15 each  Row, lats 5#   Bent over upper cut and abd x10       Scap stab  Row 30# x50  SAPD x50 Rows x10  Shrugs x10 Rows 15#  SAPD 15#  3x30     SL FLXION     PTB 2x10   SL ABD PTB 2x15    PTB 2x10   SL ER 3#  BW 2x30  3# x5 (too heavy) BW 2x30   BW 2x10  PTB 2x10   Scaption Ant delt 2x20  Mid delt 2x20    [place] Tb  Ant delt 2x20  Mid delt 2x20    [place] Tb     Ceiling punch 6# x20       No money    2x20 2x20   Tb punches    " x20 x20   ABD stretch  1 min       Neuro Re-Ed                                                                                                Patient education stage in protocol        Re-evaluation              Ther Act                                         Modalities                                                    PT protocol for Latarjet Surgery (Coracoid Process Transfer)    Juanpablo Amaya Jr.  Diagnosis: S/P left shoulder Latarjet reconstruction (coracoid transfer and anterior capsulorraphy)  DOS: 1/2/25  Frequency: 2 times/week  Duration: 12 Weeks    Phase I (Protection phase): Weeks 1-6    Precautions:  AVOID lifting objects with operative arm  AVOID provocative position: combined abduction and external rotation, combined extension and external rotation  Follow the ROM restrictions with no end range passive stretching  NO Upper Body Ergometer, Handweights, Body Blade or Therabands    Immobilization  Sling for 5 weeks. Wean off after 5 weeks  Wear sling at all times including night except when doing therapy, shower, or changing    Exercises guidelines  PROM to start a week after surgery  Restrict motion to 90° PFE, 20° PER at side for first 3 weeks  Restrict motion to 135° PFE, 45° PER at side for 3-6 weeks  Scapular exercises: Scapular elevation, depression, protraction and retraction  Hand and wrist: AROM exercises  Elbow: Avoid active ROM of elbow for first 8 weeks-protect the repair (conjoint tendon attached to bone piece)  Submaximal isometrics for rotator cuff in sling starting at 6 weeks      Phase II (Active range of motion) Weeks 6-12    Precautions:  Avoid lifting heavy objects with operative arm  AVOID provocative position: Abduction and external rotation of operative shoulder  NO Upper Body Ergometer, Handweights, Body Blade or Therabands  NO end range stretching  Avoid activities that place a lot of stress on anterior capsulolabral structures: Push ups,  press, pec flys, bench  press    Exercises guidelines  Start AROM and increase PROM  Restrict passive motion to 150° PFF/ 45° PER at side /90 degree abduction by 8 weeks and advance to full motion by 12 weeks  Restrict active motion to 110° by 8 weeks and 150° by 10 weeks and full active ROM by 12 weeks  Strengthening (isometrics/light bands) within AROM limitations and arm at side initially and slowly advance to horizontal abduction exercises  Strengthening scapular stabilizers (Latissimus dorsi, Trapezius, Rhomboids)    Phase III (Advanced Strengthening phase) 3 months and beyond      Precautions  Prevent reinjury  Avoid contact sports till 6 months    Exercise Guidelines    Full AROM, end range stretching  Only do strengthening 3x/week to avoid rotator cuff tendonitis  Begin eccentrically resisted motions, plyometrics, proprioception and closed chain exercises at 12 weeks.  Begin sports related rehab at 3-4 months, including advanced conditioning  Interval sports program at 4 months (return to golf, tennis, basketball, volleyball)  Return to throwing at 4 ½ months  Throw from pitcher's mound at 6 months     Modalities  Heat and Ice  Ultrasound    Iontophoresis    Phonophoresis  Therapists' discretion  TENS  Trigger point massage    Evaluation and others  Teach home exercise program

## 2025-03-06 ENCOUNTER — OFFICE VISIT (OUTPATIENT)
Dept: PHYSICAL THERAPY | Facility: CLINIC | Age: 40
End: 2025-03-06
Payer: COMMERCIAL

## 2025-03-06 DIAGNOSIS — M24.412 RECURRENT ANTERIOR DISLOCATION OF LEFT SHOULDER: ICD-10-CM

## 2025-03-06 DIAGNOSIS — S42.292A HILL SACHS DEFORMITY, LEFT: Primary | ICD-10-CM

## 2025-03-06 PROCEDURE — 97530 THERAPEUTIC ACTIVITIES: CPT

## 2025-03-06 PROCEDURE — 97110 THERAPEUTIC EXERCISES: CPT

## 2025-03-06 PROCEDURE — 97140 MANUAL THERAPY 1/> REGIONS: CPT

## 2025-03-06 NOTE — PROGRESS NOTES
"Daily Note     Today's date: 3/6/2025  Patient name: Juanpablo Amaya Jr.  : 1985  MRN: [id number]  Referring provider: Reji Perez MD  Dx:   Encounter Diagnosis     ICD-10-CM    1. 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 Recurrent anterior dislocation of left shoulder  M24.412           Start Time: 0930  Stop Time: 1015  Total time in clinic (min): 45 minutes    Subjective: Patient reports to be in less overall pain, sleeping better, and having better arm movement.  He affirms HEP compliance and describes carrying over active range of motion activities into his daily walks in the community      Objective: See treatment diary below      Assessment: Tolerated treatment well.  Observable increase in quality of active range of motion and able to progress therapeutic loading.  He was given a new exercise to continue facilitating these recent range of motion gains.  Tolerable to more aggressive manuals with gentle easy velocity.  Patient demonstrated fatigue post treatment, exhibited good technique with therapeutic exercises, and would benefit from continued PT      Plan: Continue per plan of care.         POC Expires Auth Status Start Date Expiration Date PT Visit Limit           Date   2025    Used        Remaining           Diagnosis: Left Laterjet (op 25)   Precautions: per protocol, NO ER in phase 1   Primary Goals: pain movement   *asterisks by exercise = given for HEP   Manuals 3/4 3/6   2025   2/24 2/26   <Wk6 , ER 45   JW Ffe 150 deg ER 45 JW Ffe 150 deg ER 45  X8 min  DO FOTO   Wk 8 Progressing PROM to ful Progressing PROM to full  X8 min      PNF - isometrics 5 min       Mobs  Anterior, POS,   LAD, JW               There Ex        Wrist AROM*        C/s stretch*        Supine ER/ABD CAne  5\"x10      Prone YTI x30   X15 each  Row, lats 4# X15 each  Row, lats 5#   Bent over upper cut and abd x10       Scap stab  Row 30# x50  SAPD x50 2x10  Rows   ER/IR walk " outs  ER 90/90  Rear Delt openers Rows 15#  SAPD 15#  3x30     SL FLXION     PTB 2x10   SL ABD PTB 2x15    PTB 2x10   SL ER 3#  BW 2x30  3# x5 (too heavy) BW 2x30   BW 2x10  PTB 2x10   Scaption Ant delt 2x20  Mid delt 2x20    Orange Tb  Ant delt 2x20  Mid delt 2x20    Orange Tb     Ceiling punch 6# x20       No money    2x20 2x20   Tb punches    x20 x20   ABD stretch  1 min 1 min      Neuro Re-Ed        Supine flexion Tb AAROM with pull aparts  3x20 purple                                                                                      Patient education stage in protocol        Re-evaluation              Ther Act              Pulleys    2'/2'                       Modalities                                                    PT protocol for Latarjet Surgery (Coracoid Process Transfer)    Juanpablo Amaya Jr.  Diagnosis: S/P left shoulder Latarjet reconstruction (coracoid transfer and anterior capsulorraphy)  DOS: 1/2/25  Frequency: 2 times/week  Duration: 12 Weeks    Phase I (Protection phase): Weeks 1-6    Precautions:  AVOID lifting objects with operative arm  AVOID provocative position: combined abduction and external rotation, combined extension and external rotation  Follow the ROM restrictions with no end range passive stretching  NO Upper Body Ergometer, Handweights, Body Blade or Therabands    Immobilization  Sling for 5 weeks. Wean off after 5 weeks  Wear sling at all times including night except when doing therapy, shower, or changing    Exercises guidelines  PROM to start a week after surgery  Restrict motion to 90° PFE, 20° PER at side for first 3 weeks  Restrict motion to 135° PFE, 45° PER at side for 3-6 weeks  Scapular exercises: Scapular elevation, depression, protraction and retraction  Hand and wrist: AROM exercises  Elbow: Avoid active ROM of elbow for first 8 weeks-protect the repair (conjoint tendon attached to bone piece)  Submaximal isometrics for rotator cuff in sling starting at 6  weeks      Phase II (Active range of motion) Weeks 6-12    Precautions:  Avoid lifting heavy objects with operative arm  AVOID provocative position: Abduction and external rotation of operative shoulder  NO Upper Body Ergometer, Handweights, Body Blade or Therabands  NO end range stretching  Avoid activities that place a lot of stress on anterior capsulolabral structures: Push ups,  press, pec flys, bench press    Exercises guidelines  Start AROM and increase PROM  Restrict passive motion to 150° PFF/ 45° PER at side /90 degree abduction by 8 weeks and advance to full motion by 12 weeks  Restrict active motion to 110° by 8 weeks and 150° by 10 weeks and full active ROM by 12 weeks  Strengthening (isometrics/light bands) within AROM limitations and arm at side initially and slowly advance to horizontal abduction exercises  Strengthening scapular stabilizers (Latissimus dorsi, Trapezius, Rhomboids)    Phase III (Advanced Strengthening phase) 3 months and beyond      Precautions  Prevent reinjury  Avoid contact sports till 6 months    Exercise Guidelines    Full AROM, end range stretching  Only do strengthening 3x/week to avoid rotator cuff tendonitis  Begin eccentrically resisted motions, plyometrics, proprioception and closed chain exercises at 12 weeks.  Begin sports related rehab at 3-4 months, including advanced conditioning  Interval sports program at 4 months (return to golf, tennis, basketball, volleyball)  Return to throwing at 4 ½ months  Throw from pitcher's mound at 6 months     Modalities  Heat and Ice  Ultrasound    Iontophoresis    Phonophoresis  Therapists' discretion  TENS  Trigger point massage    Evaluation and others  Teach home exercise program

## 2025-03-12 ENCOUNTER — OFFICE VISIT (OUTPATIENT)
Dept: PHYSICAL THERAPY | Facility: CLINIC | Age: 40
End: 2025-03-12
Payer: COMMERCIAL

## 2025-03-12 DIAGNOSIS — S42.292A HILL SACHS DEFORMITY, LEFT: Primary | ICD-10-CM

## 2025-03-12 DIAGNOSIS — M24.412 RECURRENT ANTERIOR DISLOCATION OF LEFT SHOULDER: ICD-10-CM

## 2025-03-12 PROCEDURE — 97110 THERAPEUTIC EXERCISES: CPT

## 2025-03-12 PROCEDURE — 97530 THERAPEUTIC ACTIVITIES: CPT

## 2025-03-12 NOTE — PROGRESS NOTES
"Daily Note     Today's date: 3/12/2025  Patient name: Juanpablo Amaya Jr.  : 1985  MRN: [id number]  Referring provider: Reji Perez MD  Dx:   Encounter Diagnosis     ICD-10-CM    1. 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 Recurrent anterior dislocation of left shoulder  M24.412                      Subjective: Patient reports feeling good overall no complaints, felt a good workout from last PT session.      Objective: See treatment diary below      Assessment: Tolerated treatment well.  Doing very well is progression overall with GH and ST loading.  Given wall slides for flexion and abduction into HEP to help facilitate full ROM as week 12 approaches. Patient demonstrated fatigue post treatment, exhibited good technique with therapeutic exercises, and would benefit from continued PT      Plan: Continue per plan of care.         POC Expires Auth Status Start Date Expiration Date PT Visit Limit           Date   2025    Used        Remaining           Diagnosis: Left Laterjet (op 25)   Precautions: per protocol, NO ER in phase 1   Primary Goals: pain movement   *asterisks by exercise = given for HEP   Manuals 3/4 3/6   3/12   2/24 2/26   <Wk6 , ER 45    JW Ffe 150 deg ER 45  X8 min  DO FOTO   Wk 8 Progressing PROM to ful Progressing PROM to full  X8 min Full range of motion PROM with mobs post grades 2-3     PNF - isometrics 5 min       Mobs  Anterior, POS,   LAD, JW               There Ex        Wrist AROM*        C/s stretch*        Supine ER/ABD CAne  5\"x10      Prone YTI x30   X15 each  Row, lats 4# X15 each  Row, lats 5#   Bent over upper cut and abd x10  x20     ER/IR isotonics   Blue to failure ea     Scap stab  Row 30# x50  SAPD x50 2x10  Rows   ER/IR walk outs  ER 90/90  Rear Delt openers 40# row reps to failure    20# SAPD     SL FLXION     PTB 2x10   SL ABD PTB 2x15    PTB 2x10   SL ER 3#  5#x50 BW 2x30   BW 2x10  PTB 2x10   Scaption Ant delt 2x20  Mid delt 2x20  "   [place] Tb  Ant delt 2x20  Mid delt 2x20    Orange Tb     Ceiling punch 6# x20       No money   x10 2x20 2x20   Tb punches    x20 x20   ABD stretch  1 min 1 min      Neuro Re-Ed        Supine flexion Tb AAROM with pull aparts  3x20 purple 3x20 pruple                                                                                     Patient education stage in protocol  9 weeks 6 days in protocol      Re-evaluation             Ther Act             Pulleys    2'/2' 3'/3'                    Modalities                                                   PT protocol for Latarjet Surgery (Coracoid Process Transfer)    Juanpablo Amaya Jr.  Diagnosis: S/P left shoulder Latarjet reconstruction (coracoid transfer and anterior capsulorraphy)  DOS: 1/2/25  Frequency: 2 times/week  Duration: 12 Weeks    Phase I (Protection phase): Weeks 1-6    Precautions:  AVOID lifting objects with operative arm  AVOID provocative position: combined abduction and external rotation, combined extension and external rotation  Follow the ROM restrictions with no end range passive stretching  NO Upper Body Ergometer, Handweights, Body Blade or Therabands    Immobilization  Sling for 5 weeks. Wean off after 5 weeks  Wear sling at all times including night except when doing therapy, shower, or changing    Exercises guidelines  PROM to start a week after surgery  Restrict motion to 90° PFE, 20° PER at side for first 3 weeks  Restrict motion to 135° PFE, 45° PER at side for 3-6 weeks  Scapular exercises: Scapular elevation, depression, protraction and retraction  Hand and wrist: AROM exercises  Elbow: Avoid active ROM of elbow for first 8 weeks-protect the repair (conjoint tendon attached to bone piece)  Submaximal isometrics for rotator cuff in sling starting at 6 weeks      Phase II (Active range of motion) Weeks 6-12    Precautions:  Avoid lifting heavy objects with operative arm  AVOID provocative position: Abduction and external rotation of  operative shoulder  NO Upper Body Ergometer, Handweights, Body Blade or Therabands  NO end range stretching  Avoid activities that place a lot of stress on anterior capsulolabral structures: Push ups,  press, pec flys, bench press    Exercises guidelines  Start AROM and increase PROM  Restrict passive motion to 150° PFF/ 45° PER at side /90 degree abduction by 8 weeks and advance to full motion by 12 weeks  Restrict active motion to 110° by 8 weeks and 150° by 10 weeks and full active ROM by 12 weeks  Strengthening (isometrics/light bands) within AROM limitations and arm at side initially and slowly advance to horizontal abduction exercises  Strengthening scapular stabilizers (Latissimus dorsi, Trapezius, Rhomboids)    Phase III (Advanced Strengthening phase) 3 months and beyond      Precautions  Prevent reinjury  Avoid contact sports till 6 months    Exercise Guidelines    Full AROM, end range stretching  Only do strengthening 3x/week to avoid rotator cuff tendonitis  Begin eccentrically resisted motions, plyometrics, proprioception and closed chain exercises at 12 weeks.  Begin sports related rehab at 3-4 months, including advanced conditioning  Interval sports program at 4 months (return to golf, tennis, basketball, volleyball)  Return to throwing at 4 ½ months  Throw from pitcher's mound at 6 months     Modalities  Heat and Ice  Ultrasound    Iontophoresis    Phonophoresis  Therapists' discretion  TENS  Trigger point massage    Evaluation and others  Teach home exercise program

## 2025-03-14 ENCOUNTER — OFFICE VISIT (OUTPATIENT)
Dept: PHYSICAL THERAPY | Facility: CLINIC | Age: 40
End: 2025-03-14
Payer: COMMERCIAL

## 2025-03-14 DIAGNOSIS — M24.412 RECURRENT ANTERIOR DISLOCATION OF LEFT SHOULDER: ICD-10-CM

## 2025-03-14 DIAGNOSIS — S42.292A HILL SACHS DEFORMITY, LEFT: Primary | ICD-10-CM

## 2025-03-14 PROCEDURE — 97110 THERAPEUTIC EXERCISES: CPT

## 2025-03-14 PROCEDURE — 97140 MANUAL THERAPY 1/> REGIONS: CPT

## 2025-03-14 PROCEDURE — 97530 THERAPEUTIC ACTIVITIES: CPT

## 2025-03-14 NOTE — PROGRESS NOTES
"Daily Note     Today's date: 3/14/2025  Patient name: Juanpablo Amaya Jr.  : 1985  MRN: [id number]  Referring provider: Reji Perez MD  Dx:   Encounter Diagnosis     ICD-10-CM    1. 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 Recurrent anterior dislocation of left shoulder  M24.412                      Subjective: Patient reports slight soreness after last encounter and taking a breat with HEP, but feeling good at session start today.      Objective: See treatment diary below      Assessment: Tolerated treatment well. Progressed manual passive manuals and incorporated ER/IR isotonics at 90 degress abduction nicely.  Stilling limited in abduction, adivsed to focus on abduction wall slides over the weekend. Patient demonstrated fatigue post treatment, exhibited good technique with therapeutic exercises, and would benefit from continued PT      Plan: Continue per plan of care.         POC Expires Auth Status Start Date Expiration Date PT Visit Limit           Date   2025    Used        Remaining           Diagnosis: Left Laterjet (op 25)   Precautions: per protocol, NO ER in phase 1   Primary Goals: pain movement   *asterisks by exercise = given for HEP   Manuals 3/4 3/6   3/12   3/14 2/26   <Wk6 , ER 45    JW Ffe 150 deg ER 45  X8 min  DO FOTO   Wk 8 Progressing PROM to ful Progressing PROM to full  X8 min Full range of motion PROM with mobs post grades 2-3 Full range of motion PROM with mobs post grades 2-3    ER/IR isotonics / manual resistance    PNF - isometrics 5 min   X20 at 60.90,145    Mobs  Anterior, POS,   LAD, JW   Anterior, POS,   LAD, JW             There Ex        Wrist AROM*        C/s stretch*        Supine ER/ABD CAne  5\"x10      Prone YTI x30   X15 each  Row, lats 3# X15 each  Row, lats 5#   Bent over upper cut and abd x10  x20     ER/IR isotonics   Blue to failure ea     Scap stab  Row 30# x50  SAPD x50 2x10  Rows   ER/IR walk outs  ER 90/90  Rear Delt openers " 40# row reps to failure    20# SAPD X20 all scap sereis    SL FLXION    x20 PTB 2x10   SL ABD PTB 2x15    PTB 2x10   SL ER 3#  5#x50 BW 2x30   BW 2x10  PTB 2x10   Scaption Ant delt 2x20  Mid delt 2x20    Orange Tb  Ant delt 2x20  Mid delt 2x20    Orange Tb     Ceiling punch 6# x20       No money   x10 2x20 2x20   Tb punches    x20 x20   ABD stretch  1 min 1 min      Neuro Re-Ed        Supine flexion Tb AAROM with pull aparts  3x20 purple 3x20 pruple x20    Wall slides    x20                                                                            Patient education stage in protocol  9 weeks 6 days in protocol      Re-evaluation             Ther Act             Pulleys    2'/2' 3'/3'  3'/3                  Modalities                                                   PT protocol for Latarjet Surgery (Coracoid Process Transfer)    Juanpablo Amaya Jr.  Diagnosis: S/P left shoulder Latarjet reconstruction (coracoid transfer and anterior capsulorraphy)  DOS: 1/2/25  Frequency: 2 times/week  Duration: 12 Weeks    Phase I (Protection phase): Weeks 1-6    Precautions:  AVOID lifting objects with operative arm  AVOID provocative position: combined abduction and external rotation, combined extension and external rotation  Follow the ROM restrictions with no end range passive stretching  NO Upper Body Ergometer, Handweights, Body Blade or Therabands    Immobilization  Sling for 5 weeks. Wean off after 5 weeks  Wear sling at all times including night except when doing therapy, shower, or changing    Exercises guidelines  PROM to start a week after surgery  Restrict motion to 90° PFE, 20° PER at side for first 3 weeks  Restrict motion to 135° PFE, 45° PER at side for 3-6 weeks  Scapular exercises: Scapular elevation, depression, protraction and retraction  Hand and wrist: AROM exercises  Elbow: Avoid active ROM of elbow for first 8 weeks-protect the repair (conjoint tendon attached to bone piece)  Submaximal isometrics for  rotator cuff in sling starting at 6 weeks      Phase II (Active range of motion) Weeks 6-12    Precautions:  Avoid lifting heavy objects with operative arm  AVOID provocative position: Abduction and external rotation of operative shoulder  NO Upper Body Ergometer, Handweights, Body Blade or Therabands  NO end range stretching  Avoid activities that place a lot of stress on anterior capsulolabral structures: Push ups,  press, pec flys, bench press    Exercises guidelines  Start AROM and increase PROM  Restrict passive motion to 150° PFF/ 45° PER at side /90 degree abduction by 8 weeks and advance to full motion by 12 weeks  Restrict active motion to 110° by 8 weeks and 150° by 10 weeks and full active ROM by 12 weeks  Strengthening (isometrics/light bands) within AROM limitations and arm at side initially and slowly advance to horizontal abduction exercises  Strengthening scapular stabilizers (Latissimus dorsi, Trapezius, Rhomboids)    Phase III (Advanced Strengthening phase) 3 months and beyond      Precautions  Prevent reinjury  Avoid contact sports till 6 months    Exercise Guidelines    Full AROM, end range stretching  Only do strengthening 3x/week to avoid rotator cuff tendonitis  Begin eccentrically resisted motions, plyometrics, proprioception and closed chain exercises at 12 weeks.  Begin sports related rehab at 3-4 months, including advanced conditioning  Interval sports program at 4 months (return to golf, tennis, basketball, volleyball)  Return to throwing at 4 ½ months  Throw from pitcher's mound at 6 months     Modalities  Heat and Ice  Ultrasound    Iontophoresis    Phonophoresis  Therapists' discretion  TENS  Trigger point massage    Evaluation and others  Teach home exercise program

## 2025-03-17 ENCOUNTER — OFFICE VISIT (OUTPATIENT)
Dept: PHYSICAL THERAPY | Facility: CLINIC | Age: 40
End: 2025-03-17
Payer: COMMERCIAL

## 2025-03-17 DIAGNOSIS — M24.412 RECURRENT ANTERIOR DISLOCATION OF LEFT SHOULDER: ICD-10-CM

## 2025-03-17 DIAGNOSIS — S42.292A HILL SACHS DEFORMITY, LEFT: Primary | ICD-10-CM

## 2025-03-17 PROCEDURE — 97140 MANUAL THERAPY 1/> REGIONS: CPT

## 2025-03-17 PROCEDURE — 97110 THERAPEUTIC EXERCISES: CPT

## 2025-03-17 PROCEDURE — 97530 THERAPEUTIC ACTIVITIES: CPT

## 2025-03-17 PROCEDURE — 97112 NEUROMUSCULAR REEDUCATION: CPT

## 2025-03-17 NOTE — PROGRESS NOTES
"Daily Note     Today's date: 3/17/2025  Patient name: Juanpablo Amaya Jr.  : 1985  MRN: [id number]  Referring provider: Reji Perez MD  Dx:   Encounter Diagnosis     ICD-10-CM    1. 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 Recurrent anterior dislocation of left shoulder  M24.412           Start Time: 1439  Stop Time: 1532  Total time in clinic (min): 53 minutes    Subjective: Patient report good surgical outcomes to this point in rehab process - \"what ever he did it definitely working because my arm would have definitely popped out by now\"      Objective: See treatment diary below      Assessment: Tolerated treatment well. Patient continues to progress with strength and range motion in line with protocol.  Is near full range of motion with left GH joint, still mostly limited with abduction plane.  Given new gravity assisted long duration load activities to promote range motion gains accordingly and aid full shoulder mobility.  Per subjective report, he maintains stability and does will with exercise progression which demand stability and range of motion please see below.  Patient demonstrated fatigue post treatment and exhibited good technique with therapeutic exercises      Plan: Continue per plan of care.         POC Expires Auth Status Start Date Expiration Date PT Visit Limit           Date   2025    Used        Remaining           Diagnosis: Left Laterjet (op 25)   Precautions: per protocol, NO ER in phase 1   Primary Goals: pain movement   *asterisks by exercise = given for HEP   Manuals 3/ 3/6   3/12   3/14 3/17   <Wk6 , ER 45    JW Ffe 150 deg ER 45  X8 min  DO FOTO   Wk 8 Progressing PROM to ful Progressing PROM to full  X8 min Full range of motion PROM with mobs post grades 2-3 Full range of motion PROM with mobs post grades 2-3    ER/IR isotonics / manual resistance Full range of motion PROM with mobs post grades 2-3   PNF - isometrics 5 min   X20 at 60.90,145 8 " "reps at multiple points of flexion   Mobs  Anterior, POS,   LAD, JW   Anterior, POS,   LAD, JW             There Ex        Wrist AROM*        C/s stretch*        Supine ER/ABD CAne  5\"x10   X12 x5\"   Prone YTI x30   X15 each  Row, lats 3#    Bent over upper cut and abd x10  x20     ER/IR isotonics   Blue to failure ea     Scap stab  Row 30# x50  SAPD x50 2x10  Rows   ER/IR walk outs  ER 90/90  Rear Delt openers 40# row reps to failure    20# SAPD X20 all scap sereis Rows BTB x30   SL FLXION    x20    SL ABD PTB 2x15       SL ER 3#  5#x50 BW 2x30   5# 3x20   Scaption Ant delt 2x20  Mid delt 2x20    Orange Tb  Ant delt 2x20  Mid delt 2x20    Doyline Tb     PEC FLY     BW with Band assisting hor adduction return to midline  2x10   Ceiling punch 6# x20    10# 3x10 (full press)     No money   x10 2x20 2x20   Tb punches    x20 x20   ABD/FX stretch  With weight (OP)*  1 min 1 min   5# wt (OP) 10x5\"     Neuro Re-Ed        Supine flexion Tb AAROM with pull aparts  3x20 purple 3x20 pruple x20    Wall slides    x20    Table slides     20# KB on Slide  X10 frwd/backwead/x10CC/CW ea                                                                   Patient education stage in protocol  9 weeks 6 days in protocol  Educated that he is at he his at 10 weeks and 4 days in protocoll with goal of advancing towards full rom in the next 2 weeks.    Re-evaluation             Ther Act             Pulleys    2'/2' 3'/3'  3'/3  Pulley's 3'3'                Modalities                                                   PT protocol for Latarjet Surgery (Coracoid Process Transfer)    Juanpablo Amaya Jr.  Diagnosis: S/P left shoulder Latarjet reconstruction (coracoid transfer and anterior capsulorraphy)  DOS: 1/2/25  Frequency: 2 times/week  Duration: 12 Weeks    Phase I (Protection phase): Weeks 1-6    Precautions:  AVOID lifting objects with operative arm  AVOID provocative position: combined abduction and external rotation, combined extension and " external rotation  Follow the ROM restrictions with no end range passive stretching  NO Upper Body Ergometer, Handweights, Body Blade or Therabands    Immobilization  Sling for 5 weeks. Wean off after 5 weeks  Wear sling at all times including night except when doing therapy, shower, or changing    Exercises guidelines  PROM to start a week after surgery  Restrict motion to 90° PFE, 20° PER at side for first 3 weeks  Restrict motion to 135° PFE, 45° PER at side for 3-6 weeks  Scapular exercises: Scapular elevation, depression, protraction and retraction  Hand and wrist: AROM exercises  Elbow: Avoid active ROM of elbow for first 8 weeks-protect the repair (conjoint tendon attached to bone piece)  Submaximal isometrics for rotator cuff in sling starting at 6 weeks      Phase II (Active range of motion) Weeks 6-12    Precautions:  Avoid lifting heavy objects with operative arm  AVOID provocative position: Abduction and external rotation of operative shoulder  NO Upper Body Ergometer, Handweights, Body Blade or Therabands  NO end range stretching  Avoid activities that place a lot of stress on anterior capsulolabral structures: Push ups,  press, pec flys, bench press    Exercises guidelines  Start AROM and increase PROM  Restrict passive motion to 150° PFF/ 45° PER at side /90 degree abduction by 8 weeks and advance to full motion by 12 weeks  Restrict active motion to 110° by 8 weeks and 150° by 10 weeks and full active ROM by 12 weeks  Strengthening (isometrics/light bands) within AROM limitations and arm at side initially and slowly advance to horizontal abduction exercises  Strengthening scapular stabilizers (Latissimus dorsi, Trapezius, Rhomboids)    Phase III (Advanced Strengthening phase) 3 months and beyond      Precautions  Prevent reinjury  Avoid contact sports till 6 months    Exercise Guidelines    Full AROM, end range stretching  Only do strengthening 3x/week to avoid rotator cuff tendonitis  Begin  eccentrically resisted motions, plyometrics, proprioception and closed chain exercises at 12 weeks.  Begin sports related rehab at 3-4 months, including advanced conditioning  Interval sports program at 4 months (return to golf, tennis, basketball, volleyball)  Return to throwing at 4 ½ months  Throw from pitcher's mound at 6 months     Modalities  Heat and Ice  Ultrasound    Iontophoresis    Phonophoresis  Therapists' discretion  TENS  Trigger point massage    Evaluation and others  Teach home exercise program

## 2025-03-20 ENCOUNTER — EVALUATION (OUTPATIENT)
Dept: PHYSICAL THERAPY | Facility: CLINIC | Age: 40
End: 2025-03-20
Payer: COMMERCIAL

## 2025-03-20 DIAGNOSIS — M24.412 RECURRENT ANTERIOR DISLOCATION OF LEFT SHOULDER: ICD-10-CM

## 2025-03-20 DIAGNOSIS — S42.292A HILL SACHS DEFORMITY, LEFT: Primary | ICD-10-CM

## 2025-03-20 PROCEDURE — 97110 THERAPEUTIC EXERCISES: CPT

## 2025-03-20 PROCEDURE — 97112 NEUROMUSCULAR REEDUCATION: CPT

## 2025-03-20 PROCEDURE — 97140 MANUAL THERAPY 1/> REGIONS: CPT

## 2025-03-20 NOTE — PROGRESS NOTES
PT Evaluation     Today's date: 3/20/2025  Patient name: Juanpablo Amaya Jr.  : 1985  MRN: [id number]  Referring provider: Reji Perez MD  Dx:   Encounter Diagnosis     ICD-10-CM    1. 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 Recurrent anterior dislocation of left shoulder  M24.412             Start Time: 935  Stop Time: 1015  Total time in clinic (min): 40 minutes    Assessment  Impairments: abnormal muscle firing, abnormal muscle tone, abnormal or restricted ROM, abnormal movement, impaired physical strength, lacks appropriate home exercise program, pain with function and poor posture     Assessment details: Juanpablo Amaya Jr. is a pleasant 39 y.o. male who presents to PT Re-Eval 11 weeks s/p left shoulder laterjet procedure with coracoid transfer and anteriorcaspuloraphy.  Juanpablo has been compliant with attending PT and home exercise program since initial eval. He has made mild improvements in subjective and objective data since initial eval but continues to have limitations compared to prior level of function. He reports about 90% improvement since beginning PT and still struggles with lifting his arm overhead .  He continues to have deficits in the above listed impairments and would benefit from additional skilled PT to address these deficits to return to prior level of function.    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  3/20/2025: Juanpablo will achieve a 4/5 MMT score for his left upper trap.  3/20/2025:  Patient will have active 170 left shoulder flexion without compensation.             Plan  Patient would benefit from: skilled physical therapy  Planned modality interventions: cryotherapy, TENS, thermotherapy: hydrocollator packs and unattended electrical stimulation    Planned therapy interventions: abdominal trunk stabilization, behavior modification, body mechanics training, breathing training, flexibility, functional ROM exercises, home exercise program, joint mobilization, manual therapy, massage, Davidson taping, muscle pump exercises, neuromuscular re-education, patient education, postural training, strengthening, stretching, therapeutic activities, therapeutic exercise and therapeutic training    Frequency: 2x week  Duration in weeks: 8  Treatment plan discussed with: patient  Plan details: Prognosis above is given PT services 2x/week tapering to 1x/week over the next 2 months and home program adherence.        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  Hx of bankart repair with minimal improvement.  He comments that his instability is also in part to \"party trick\" activities where he would use his arms to jump rope.    PAIN LOCATION/DESCRIPTORS: side delt when in certain positions  AGGRAVATING FACTORS:  Fxn IR and 90 Abdcution, and empty can provoke symptoms  SPECIAL QUESTIONS:    Juanpablo denies a new onset of Tingling and Numbness.  PATIENT GOALS:  strengthen for the surgery    3/20/2025 Reevaluation:  Patient reports 90% improvement and report trouble with lifting behind.  He " comments that he feel ready to return to work as long as he's not doing exorbitant loads of lifting.    Patient Goals  Patient goals for therapy: decreased pain, increased strength and return to work    Pain  Current pain ratin  At best pain ratin  At worst pain ratin  Quality: grinding, pressure, sharp and tight  Aggravating factors: overhead activity        Objective     Active Range of Motion   Left Shoulder   Flexion: 130 degrees   Extension: 50 degrees   Abduction: 126 degrees   External rotation 0°: 65 degrees   External rotation 45°: 50 degrees   External rotation 90°: 55 degrees     Right Shoulder   Flexion: WFL  Extension: WFL  Abduction: WFL  External rotation 0°: WFL  External rotation 45°: WFL  External rotation 90°: WFL    Passive Range of Motion   Left Shoulder   Flexion: 160 degrees   Abduction: 130 degrees     Strength/Myotome Testing     Left Shoulder     Planes of Motion   Flexion: 4   Extension: 4+   Abduction: 3+   External rotation at 0°: 4-   Internal rotation at 0°: 4-   Horizontal abduction: 5   Horizontal adduction: 5     Isolated Muscles   Lower trapezius: 3+     Right Shoulder     Planes of Motion   Flexion: 4   Extension: 4+   Abduction: 4   Adduction: 4   External rotation at 0°: 4   External rotation at 45°: 4   External rotation at 90°: 4-   Internal rotation at 0°: 4     Isolated Muscles   Lower trapezius: 4+   Middle trapezius: 4+     Additional Strength Details  Left Shoulder strength assessment deferred 2/2 phases of healing    General Comments:      Shoulder Comments     UQS (negative)    C/S ROM WFL - no incitation of of symptoms               POC Expires Auth Status Start Date Expiration Date PT Visit Limit           Date   2025    Used        Remaining           Diagnosis: Left Laterjet (op 25)   Precautions: per protocol, NO ER in phase 1   Primary Goals: pain movement   *asterisks by exercise = given for HEP   Manuals 3/20 3/6   3/12   3/14 3/17  "  <Wk6 , ER 45    JW Ffe 150 deg ER 45  X8 min  DO FOTO   Wk 8  Progressing PROM to full  X8 min Full range of motion PROM with mobs post grades 2-3 Full range of motion PROM with mobs post grades 2-3    ER/IR isotonics / manual resistance Full range of motion PROM with mobs post grades 2-3   PNF - isometrics    X20 at 60.90,145 8 reps at multiple points of flexion   Mobs  Anterior, POS,   LAD, JW   Anterior, POS,   LAD, JW             There Ex        Wrist AROM*        C/s stretch*        Supine ER/ABD CAne  5\"x10   X12 x5\"   Prone YTI    X15 each  Row, lats 3#    Bent over upper cut and abd x10  x20     ER/IR isotonics   Blue to failure ea     Scap stab  Tb rear delt OH    2x10  Rows   ER/IR walk outs  ER 90/90  Rear Delt openers 40# row reps to failure    20# SAPD X20 all scap sereis Rows BTB x30   SL FLXION    x20    SL ABD        SL ER   5#x50 BW 2x30   5# 3x20   Scaption   Ant delt 2x20  Mid delt 2x20    [place] Tb     PEC FLY     BW with Band assisting hor adduction return to midline  2x10   Ceiling punch     10# 3x10 (full press)     No money   x10 2x20 2x20   Tb punches    x20 x20   ABD/FX stretch  With weight (OP)*  x10 1 min   5# wt (OP) 10x5\"     Neuro Re-Ed        Supine flexion Tb AAROM with pull aparts  3x20 purple 3x20 pruple x20    Wall slides X10 flx/abd ea  Pendulums inbtween   x20    Table slides     20# KB on Slide  X10 frwd/backwead/x10CC/CW ea   Bent over db row 10 lbs x10                                                               Patient education 11 weeks post op and associated precautions      9 weeks 6 days in protocol  Educated that he is at he his at 10 weeks and 4 days in protocoll with goal of advancing towards full rom in the next 2 weeks.    Re-evaluation  ZEINAB           Ther Act             Pulleys  2'/2'  2'/2' 3'/3'  3'/3  Pulley's 3'3'                Modalities                                                   PT protocol for Latarjet Surgery (Coracoid Process " Transfer)    Juanpablo Amaya Jr.  Diagnosis: S/P left shoulder Latarjet reconstruction (coracoid transfer and anterior capsulorraphy)  DOS: 1/2/25  Frequency: 2 times/week  Duration: 12 Weeks    Phase I (Protection phase): Weeks 1-6    Precautions:  AVOID lifting objects with operative arm  AVOID provocative position: combined abduction and external rotation, combined extension and external rotation  Follow the ROM restrictions with no end range passive stretching  NO Upper Body Ergometer, Handweights, Body Blade or Therabands    Immobilization  Sling for 5 weeks. Wean off after 5 weeks  Wear sling at all times including night except when doing therapy, shower, or changing    Exercises guidelines  PROM to start a week after surgery  Restrict motion to 90° PFE, 20° PER at side for first 3 weeks  Restrict motion to 135° PFE, 45° PER at side for 3-6 weeks  Scapular exercises: Scapular elevation, depression, protraction and retraction  Hand and wrist: AROM exercises  Elbow: Avoid active ROM of elbow for first 8 weeks-protect the repair (conjoint tendon attached to bone piece)  Submaximal isometrics for rotator cuff in sling starting at 6 weeks      Phase II (Active range of motion) Weeks 6-12    Precautions:  Avoid lifting heavy objects with operative arm  AVOID provocative position: Abduction and external rotation of operative shoulder  NO Upper Body Ergometer, Handweights, Body Blade or Therabands  NO end range stretching  Avoid activities that place a lot of stress on anterior capsulolabral structures: Push ups,  press, pec flys, bench press    Exercises guidelines  Start AROM and increase PROM  Restrict passive motion to 150° PFF/ 45° PER at side /90 degree abduction by 8 weeks and advance to full motion by 12 weeks  Restrict active motion to 110° by 8 weeks and 150° by 10 weeks and full active ROM by 12 weeks  Strengthening (isometrics/light bands) within AROM limitations and arm at side initially and  slowly advance to horizontal abduction exercises  Strengthening scapular stabilizers (Latissimus dorsi, Trapezius, Rhomboids)    Phase III (Advanced Strengthening phase) 3 months and beyond      Precautions  Prevent reinjury  Avoid contact sports till 6 months    Exercise Guidelines    Full AROM, end range stretching  Only do strengthening 3x/week to avoid rotator cuff tendonitis  Begin eccentrically resisted motions, plyometrics, proprioception and closed chain exercises at 12 weeks.  Begin sports related rehab at 3-4 months, including advanced conditioning  Interval sports program at 4 months (return to golf, tennis, basketball, volleyball)  Return to throwing at 4 ½ months  Throw from pitcher's mound at 6 months     Modalities  Heat and Ice  Ultrasound    Iontophoresis    Phonophoresis  Therapists' discretion  TENS  Trigger point massage    Evaluation and others  Teach home exercise program

## 2025-03-24 ENCOUNTER — OFFICE VISIT (OUTPATIENT)
Dept: PHYSICAL THERAPY | Facility: CLINIC | Age: 40
End: 2025-03-24
Payer: COMMERCIAL

## 2025-03-24 DIAGNOSIS — S42.292A HILL SACHS DEFORMITY, LEFT: Primary | ICD-10-CM

## 2025-03-24 DIAGNOSIS — M24.412 RECURRENT ANTERIOR DISLOCATION OF LEFT SHOULDER: ICD-10-CM

## 2025-03-24 PROCEDURE — 97110 THERAPEUTIC EXERCISES: CPT

## 2025-03-24 PROCEDURE — 97530 THERAPEUTIC ACTIVITIES: CPT

## 2025-03-24 NOTE — PROGRESS NOTES
"Daily Note     Today's date: 3/24/2025  Patient name: Juanpablo Amaya Jr.  : 1985  MRN: [id number]  Referring provider: Reji Perez MD  Dx:   Encounter Diagnosis     ICD-10-CM    1. 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 Recurrent anterior dislocation of left shoulder  M24.412           Start Time: 1534  Stop Time: 1600  Total time in clinic (min): 26 minutes    Subjective: Patient feeling a little soar from last time but overall well with minimal pain (1/10)      Objective: See treatment diary below      Assessment: Tolerated treatment well.  Inc weighted eccentric descent with wall slide GH elevation range motion, tolerated well and demo challenged as subjective report, increased sweat and fasciculation. Patient demonstrated fatigue post treatment, exhibited good technique with therapeutic exercises, and would benefit from continued PT      Plan: Continue per plan of care.         POC Expires Auth Status Start Date Expiration Date PT Visit Limit           Date   2025    Used        Remaining           Diagnosis: Left Laterjet (op 25)   Precautions: per protocol, NO ER in phase 1   Primary Goals: pain movement   *asterisks by exercise = given for HEP   Manuals 3/20 3/24   3/12   3/14 3/17   <Wk6 , ER 45    JW Ffe 150 deg ER 45  X8 min  DO FOTO   Wk 8   Full range of motion PROM with mobs post grades 2-3 Full range of motion PROM with mobs post grades 2-3    ER/IR isotonics / manual resistance Full range of motion PROM with mobs post grades 2-3   PNF - isometrics    X20 at 60.90,145 8 reps at multiple points of flexion   Mobs    Anterior, POS,   LAD, JW             There Ex        Wrist AROM*        C/s stretch*        Supine ER/ABD CAne     X12 x5\"   Prone YTI  X10 all direction \"5\"  X15 each  Row, lats 3#    Bent over upper cut and abd x10  x20     ER/IR isotonics   Blue to failure ea     Scap stab  Tb rear delt OH     40# row reps to failure    20# SAPD X20 all scap " "sereis Rows BTB x30   SL FLXION    x20    SL ABD        SL ER   5#x50 BW 2x30   5# 3x20   Scaption   Ant delt 2x20  Mid delt 2x20    [place] Tb     PEC FLY     BW with Band assisting hor adduction return to midline  2x10   Ceiling punch     10# 3x10 (full press)     No money   x10 2x20 2x20   Tb punches    x20 x20   ABD/FX stretch  With weight (OP)*  x10    5# wt (OP) 10x5\"     Neuro Re-Ed        Supine flexion Tb AAROM with pull aparts   3x20 pruple x20    Wall slides X10 flx/abd ea  Pendulums inbtween 3X10 FF lowering  3x10 ABD lowering  weighted ecc 5#   x20    Table slides     20# KB on Slide  X10 frwd/backwead/x10CC/CW ea   Bent over db row 10 lbs x10 15lb x10      Functional Rot Stetching   Tb assist (P!)                                                      Patient education 11 weeks post op and associated precautions      9 weeks 6 days in protocol  Educated that he is at he his at 10 weeks and 4 days in protocoll with goal of advancing towards full rom in the next 2 weeks.    Re-evaluation  JW          Ther Act            Pulleys  2'/2' 3'/3' 3'/3'  3'/3  Pulley's 3'3'                Modalities                                                     "

## 2025-03-25 ENCOUNTER — HOSPITAL ENCOUNTER (OUTPATIENT)
Dept: RADIOLOGY | Facility: HOSPITAL | Age: 40
Discharge: HOME | End: 2025-03-25
Attending: STUDENT IN AN ORGANIZED HEALTH CARE EDUCATION/TRAINING PROGRAM
Payer: COMMERCIAL

## 2025-03-25 ENCOUNTER — OFFICE VISIT (OUTPATIENT)
Dept: OBGYN CLINIC | Facility: CLINIC | Age: 40
End: 2025-03-25

## 2025-03-25 VITALS — WEIGHT: 242 LBS | BODY MASS INDEX: 33.88 KG/M2 | HEIGHT: 71 IN

## 2025-03-25 DIAGNOSIS — Z98.890 STATUS POST SURGERY: Primary | ICD-10-CM

## 2025-03-25 DIAGNOSIS — Z98.890 STATUS POST SURGERY: ICD-10-CM

## 2025-03-25 PROCEDURE — 73030 X-RAY EXAM OF SHOULDER: CPT

## 2025-03-25 PROCEDURE — 99024 POSTOP FOLLOW-UP VISIT: CPT | Performed by: STUDENT IN AN ORGANIZED HEALTH CARE EDUCATION/TRAINING PROGRAM

## 2025-03-25 NOTE — PROGRESS NOTES
Ortho Sports Medicine Shoulder Follow Up Visit     Assesment:   39 y.o. male left shoulder 11 weeks and 5 days status post Latarjet shoulder reconstruction of the left shoulder, doing well, progressing well post-operatively.    Plan:  Rudolph continues to do well.  He is overall very pleased with his outcome and states that his range of motion has now near normalized, and he states that his shoulder has been very stable and he is able to fully range his shoulder without instability which she was having prior to surgery.  He also denies any pain.  I did review with him that there does appear to be a little bit of graft resorption at the superior screw.  I discussed how graft resorption was an established phenomenon that did occur after Latarjet procedures with reported incidence as high as 70-90% and more common at the superior screw as is the case here, and I discussed how we would continue to monitor this with serial x-rays, however this did not require any formal intervention at this time.  Clinically he is doing very well.  We discussed how we did review prior to surgery that he would lose a little bit of external rotation compared to his contralateral side, however this was the trade off for stability, and he states that his slight limitation in external rotation is not limiting him.  We discussed that he can now start to advance his strengthening as tolerated and we discussed the importance of continuing to adhere to his protocol.  He will now advance to phase 3 of physical therapy. Rudolph voiced understanding of this. He will follow up in 4 weeks for reevaluation of the left shoulder.  All of his questions were answered in the office today.     With regards to work, he is not yet cleared to return to work at this time. Will plan for return to work at 4 months post-operatively.    Follow up:    Return in about 4 weeks (around 4/22/2025). Left shoulder x-rays (AP, Grashey, Scapular Y)      Chief Complaint   Patient  "presents with    Left Shoulder - Post-op         History of Present Illness:    Rudolph returns for follow-up of his left shoulder.  He is 11 weeks and 5 days status post left shoulder arthroscopy and latarjet coracoid transfer procedure.  He continues to do very well.  He states that his anterior incisional site pain has improved and he now has virtually no pain.  He states that his range of motion is significantly improved, and he denies any recurrent instability or apprehension.  He states that he is very pleased as previously he used to have dislocation when he ranges his shoulder as much as he is ranging at now.  He does note some very minimal loss of external rotation with the arm adducted compared to his contralateral side, however this does not bother him and this is not significantly affecting him.  He continues to work with physical therapy and they recently started working on strengthening.  He has not yet gone back to work.  He denies numbness / tingling. No new injuries.  He continues to be totally off any tobacco or nicotine products.      Review of systems: ROS is negative other than that noted in the HPI.  Constitutional: Negative for fatigue and fever.      Physical Exam:    Height 5' 11\" (1.803 m), weight 110 kg (242 lb).    General/Constitutional: NAD, well developed, well nourished  HENT: Normocephalic, atraumatic  CV: Intact distal pulses, regular rate  Resp: No respiratory distress or labored breathing  GI: Soft and non-tender   Lymphatic: No lymphadenopathy palpated  Neuro: Alert and Oriented x 3, no focal deficits  Psych: Normal mood, normal affect, normal judgement, normal behavior  Skin: Warm, dry, no rashes, no erythema      Left Shoulder focused exam:  Incisions healed well without evidence of breakdown or erythema   Nontender to palpation over the anterior shoulder and biceps tendon, proximal humerus, and scapular spine, nontender over the AC joint  Active range of motion,  (170 " contralateral), ER0 35 (45 contralateral), Irb L3 (T12 contralateral)  PROM , ER 40, abduction 90, ABER 80, no apprehension, ABIR 50    UE NV Exam: +2 Radial pulses bilaterally  Sensation intact to light touch C5-T1 bilaterally  5/5 deltoid, biceps, triceps, EPL, FPL, IO muscles  5/5 supraspinatus / infraspinatus / subscapularis      Shoulder Imaging    I personally reviewed and interpreted 3 views of the left shoulder including AP, Grashey, axillary lateral, and Scapular Y obtained in the office today which demonstrate s/p Latarjet coracoid transfer with intact hardware and bone block. No evidence of hardware failure and bone block appears to be in same position compared to prior x-rays obtained on 2/10/25 with no evidence of migration. Bone block appears well apposed to the glenoid on scapular Y view and axillary lateral.  There does appear to be a little bit of graft resorption at the superior screw as evidenced by a little bit of bone loss near the screw head, however no obvious migration of the graft or loosening or migration or breakage of the screw. I reviewed this in detail with the patient.  Radiology report pending but I will review it once it is available.

## 2025-03-28 ENCOUNTER — OFFICE VISIT (OUTPATIENT)
Dept: PHYSICAL THERAPY | Facility: CLINIC | Age: 40
End: 2025-03-28
Payer: COMMERCIAL

## 2025-03-28 DIAGNOSIS — M24.412 RECURRENT ANTERIOR DISLOCATION OF LEFT SHOULDER: ICD-10-CM

## 2025-03-28 DIAGNOSIS — S42.292A HILL SACHS DEFORMITY, LEFT: Primary | ICD-10-CM

## 2025-03-28 PROCEDURE — 97530 THERAPEUTIC ACTIVITIES: CPT

## 2025-03-28 PROCEDURE — 97112 NEUROMUSCULAR REEDUCATION: CPT

## 2025-03-28 PROCEDURE — 97110 THERAPEUTIC EXERCISES: CPT

## 2025-03-28 NOTE — PROGRESS NOTES
"Daily Note     Today's date: 3/28/2025  Patient name: Juanpablo Amaya Jr.  : 1985  MRN: [id number]  Referring provider: Reji ePrez MD  Dx:   Encounter Diagnosis     ICD-10-CM    1. 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 Recurrent anterior dislocation of left shoulder  M24.412           Start Time: 1345  Stop Time: 1430  Total time in clinic (min): 45 minutes    Subjective: Patient reports meeting with ortho going well, he is pleased with mobility,  he will be cleared form work for another month.  Patient maintains that he'd like to continue therapy aswell.        Objective: See treatment diary below      Assessment: Tolerated treatment well. Tolerating increased shoulder loading well without pain, endorses exertional muscle pump with higher RPE, denies pain. Patient demonstrated fatigue post treatment, exhibited good technique with therapeutic exercises, and would benefit from continued PT      Plan: Continue per plan of care.         POC Expires Auth Status Start Date Expiration Date PT Visit Limit           Date   2025    Used        Remaining           Diagnosis: Left Laterjet (op 25)   Precautions: per protocol, NO ER in phase 1   Primary Goals: pain movement   *asterisks by exercise = given for HEP   Manuals 3/20 3/24   3/28   3/14 3/17   <Wk6 , ER 45    JW Ffe 150 deg ER 45  X8 min  DO FOTO   Wk 8   Full range of motion PROM with mobs post grades 2-3 Full range of motion PROM with mobs post grades 2-3    ER/IR isotonics / manual resistance Full range of motion PROM with mobs post grades 2-3   PNF - isometrics    X20 at 60.90,145 8 reps at multiple points of flexion   Mobs    Anterior, POS,   LAD, JW             There Ex        Wrist AROM*        C/s stretch*        Supine ER/ABD CAne     X12 x5\"   Prone YTI  X10 all direction \"5\"  X15 each  Row, lats 3#    Bent over upper cut and abd x10  x20     ER/IR isotonics   Blue to failure ea     Scap stab  Tb rear delt OH " "    40# row reps to failure    20# SAPD X20 all scap sereis Rows BTB x30   SL FLXION    x20    SL ABD        SL ER   5#x50 BW 2x30   5# 3x20   Scaption   Ant delt 2x20  Mid delt 2x20    [place] Tb     PEC FLY     BW with Band assisting hor adduction return to midline  2x10   Ceiling punch     10# 3x10 (full press)     No money   x10 2x20 2x20   Tb punches    x20 x20   ABD/FX stretch  With weight (OP)*  x10    5# wt (OP) 10x5\"     Neuro Re-Ed        Supine flexion Tb AAROM with pull aparts   3x20 pruple x20    Wall slides X10 flx/abd ea  Pendulums inbtween 3X10 FF lowering  3x10 ABD lowering  weighted ecc 5#   x20    Table slides     20# KB on Slide  X10 frwd/backwead/x10CC/CW ea   Bent over db row 10 lbs x10 15lb x10 25# 3x10     Functional Rot Stetching   Tb assist (P!)      Chest press   15# 3x10                                             Patient education 11 weeks post op and associated precautions      9 weeks 6 days in protocol  Educated that he is at he his at 10 weeks and 4 days in protocoll with goal of advancing towards full rom in the next 2 weeks.    Re-evaluation  JW          Ther Act            Pulleys  2'/2' 3'/3' 3'/3'  3'/3  Pulley's 3'3'    UBE     Level `0 2'  Level 4 3\"       Modalities                                                       "

## 2025-03-31 ENCOUNTER — OFFICE VISIT (OUTPATIENT)
Dept: PHYSICAL THERAPY | Facility: CLINIC | Age: 40
End: 2025-03-31
Payer: COMMERCIAL

## 2025-03-31 DIAGNOSIS — M24.412 RECURRENT ANTERIOR DISLOCATION OF LEFT SHOULDER: ICD-10-CM

## 2025-03-31 DIAGNOSIS — S42.292A HILL SACHS DEFORMITY, LEFT: Primary | ICD-10-CM

## 2025-03-31 PROCEDURE — 97112 NEUROMUSCULAR REEDUCATION: CPT

## 2025-03-31 PROCEDURE — 97110 THERAPEUTIC EXERCISES: CPT

## 2025-03-31 PROCEDURE — 97530 THERAPEUTIC ACTIVITIES: CPT

## 2025-03-31 NOTE — PROGRESS NOTES
"Daily Note     Today's date: 3/31/2025  Patient name: Juanpablo Amaya Jr.  : 1985  MRN: [id number]  Referring provider: Reji Perez MD  Dx:   Encounter Diagnosis     ICD-10-CM    1. 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 Recurrent anterior dislocation of left shoulder  M24.412           Start Time: 1600  Stop Time: 1645  Total time in clinic (min): 45 minutes    Subjective: Patient maintains shoulder stability and has new new complaints.  Still has some soarness with sleeping on on for too long.        Objective: See treatment diary below      Assessment: Tolerated treatment well. Doing well, becoming more tolerable to aggressive rotary GH PROM,   inc manual theraband resisted isotonics.  Patient demonstrated fatigue post treatment, exhibited good technique with therapeutic exercises, and would benefit from continued PT      Plan: Continue per plan of care.         POC Expires Auth Status Start Date Expiration Date PT Visit Limit           Date   2025    Used        Remaining           Diagnosis: Left Laterjet (op 25)   Precautions: per protocol, NO ER in phase 1   Primary Goals: pain movement   *asterisks by exercise = given for HEP   Manuals 3/20 3/24   3/28   3/31 3/17   <Wk6 , ER 45     DO FOTO   Wk 8   Full range of motion PROM with mobs post grades 2-3  Full range of motion PROM with mobs post grades 2-3   PNF - isometrics     8 reps at multiple points of flexion   Mobs    PROM and grade 1-2 shoulder mob  JW        Manual tb resisted internal roaiton at 90 2x15    There Ex        Wrist AROM*        C/s stretch*        Supine ER/ABD CAne     X12 x5\"   Prone YTI  X10 all direction \"5\"      Bent over upper cut and abd x10  x20     ER/IR isotonics   Blue to failure ea 4# [place] 2x10 ea    Scap stab  Tb rear delt OH     40# row reps to failure    20# SAPD 50# 2x15 Rows BTB x30   SL FLXION        SL ABD        SL ER   5#x50  5# 3x20   Scaption   Ant delt 2x20  Mid delt " "2x20    [place] Tb     PEC FLY     BW with Band assisting hor adduction return to midline  2x10   Ceiling punch     10# 3x10 (full press)     No money   x10  2x20   Tb punches     x20   ABD/FX stretch  With weight (OP)*  x10    5# wt (OP) 10x5\"     Neuro Re-Ed        Supine flexion Tb AAROM with pull aparts   3x20 pruple     Wall slides X10 flx/abd ea  Pendulums inbtween 3X10 FF lowering  3x10 ABD lowering  weighted ecc 5#   X20 with revolutions    Table slides     20# KB on Slide  X10 frwd/backwead/x10CC/CW ea   Bent over db row 10 lbs x10 15lb x10 25# 3x10     Functional Rot Stetching   Tb assist (P!)  Prayer holds x20 4\" holds    Chest press   15# 3x10 2x10                                            Patient education 11 weeks post op and associated precautions      9 weeks 6 days in protocol  Educated that he is at he his at 10 weeks and 4 days in protocoll with goal of advancing towards full rom in the next 2 weeks.    Re-evaluation  JW          Ther Act            Pulleys  2'/2' 3'/3' 3'/3'    Pulley's 3'3'    UBE     Level `0 2'  Level 4 3\"  2/2 level 7.5     Modalities                                                         "

## 2025-04-04 ENCOUNTER — OFFICE VISIT (OUTPATIENT)
Dept: PHYSICAL THERAPY | Facility: CLINIC | Age: 40
End: 2025-04-04
Payer: COMMERCIAL

## 2025-04-04 DIAGNOSIS — M24.412 RECURRENT ANTERIOR DISLOCATION OF LEFT SHOULDER: ICD-10-CM

## 2025-04-04 DIAGNOSIS — S42.292A HILL SACHS DEFORMITY, LEFT: Primary | ICD-10-CM

## 2025-04-04 PROCEDURE — 97140 MANUAL THERAPY 1/> REGIONS: CPT

## 2025-04-04 PROCEDURE — 97530 THERAPEUTIC ACTIVITIES: CPT

## 2025-04-04 PROCEDURE — 97110 THERAPEUTIC EXERCISES: CPT

## 2025-04-04 NOTE — PROGRESS NOTES
"Daily Note     Today's date: 2025  Patient name: Juanpablo Amaya Jr.  : 1985  MRN: [id number]  Referring provider: Reji Perez MD  Dx: No diagnosis found.    Start Time: 1345  Stop Time: 1430  Total time in clinic (min): 45 minutes    Subjective: \"Reaching behind my back is still tough.\"      Objective: See treatment diary below      Assessment: Tolerated treatment well. Demo'd mild shrug with delt raises today, performed scapular assistance which helped mitigate UT involvment, denies pain with this movement.  Patient demonstrated fatigue post treatment, exhibited good technique with therapeutic exercises, and would benefit from continued PT      Plan: Continue per plan of care.         POC Expires Auth Status Start Date Expiration Date PT Visit Limit           Date   2025    Used        Remaining           Diagnosis: Left Laterjet (op 25)   Precautions: per protocol, NO ER in phase 1   Primary Goals: pain movement   *asterisks by exercise = given for HEP   Manuals 3/20 3/24   3/28   3/31 4/4   <Wk6 , ER 45     DO FOTO done   Wk 8   Full range of motion PROM with mobs post grades 2-3  Full range of motion PROM with mobs post grades 2-3   PNF - isometrics        Mobs    PROM and grade 1-2 shoulder mob  JW        Manual tb resisted internal roaiton at 90 2x15    There Ex        Wrist AROM*        C/s stretch*        Supine ER/ABD CAne        Prone YTI  X10 all direction \"5\"      Bent over upper cut and abd x10  x20     ER/IR isotonics   Blue to failure ea 4# [place] 2x10 ea 4# x 20  6# x 12   Scap stab  Tb rear delt OH     40# row reps to failure    20# SAPD 50# 2x15 Prone 30# DB 3x12    Trx Rows 2x10    Usman 45#    SL FLXION        SL ABD        SL ER   5#x50     Scaption   Ant delt 2x20  Mid delt 2x20    [place] Tb     PEC FLY        Ceiling punch        No money   x10     Tb punches        ABD/FX stretch  With weight (OP)*  x10    6->3# DB drop set     Neuro Re-Ed      " "  Supine flexion Tb AAROM with pull aparts   3x20 pruple     Wall slides X10 flx/abd ea  Pendulums inbtween 3X10 FF lowering  3x10 ABD lowering  weighted ecc 5#   X20 with revolutions    Table slides        Bent over db row 10 lbs x10 15lb x10 25# 3x10     Functional Rot Stetching   Tb assist (P!)  Prayer holds x20 4\" holds Strap ER/IR ea 5x10\"   Chest press   15# 3x10 2x10    KB Pull overs QPED     15# 3x10   Trx Crossbody  S     10x10\"                           Patient education 11 weeks post op and associated precautions      9 weeks 6 days in protocol      Re-evaluation  JW          Ther Act            Pulleys  2'/2' 3'/3' 3'/3'    Pulley's 3'3'    UBE     Level `0 2'  Level 4 3\"       Modalities                                                           "

## 2025-04-07 ENCOUNTER — APPOINTMENT (OUTPATIENT)
Dept: PHYSICAL THERAPY | Facility: CLINIC | Age: 40
End: 2025-04-07
Payer: COMMERCIAL

## 2025-04-11 ENCOUNTER — OFFICE VISIT (OUTPATIENT)
Dept: PHYSICAL THERAPY | Facility: CLINIC | Age: 40
End: 2025-04-11
Payer: COMMERCIAL

## 2025-04-11 DIAGNOSIS — S42.292A HILL SACHS DEFORMITY, LEFT: Primary | ICD-10-CM

## 2025-04-11 DIAGNOSIS — M24.412 RECURRENT ANTERIOR DISLOCATION OF LEFT SHOULDER: ICD-10-CM

## 2025-04-11 PROCEDURE — 97530 THERAPEUTIC ACTIVITIES: CPT

## 2025-04-11 PROCEDURE — 97140 MANUAL THERAPY 1/> REGIONS: CPT

## 2025-04-11 PROCEDURE — 97110 THERAPEUTIC EXERCISES: CPT

## 2025-04-11 NOTE — PROGRESS NOTES
"Discharge Summary      Today's date: 2025  Patient name: Juanpablo Amaya Jr.  : 1985  MRN: [id number]  Referring provider: Reji Perez MD  Dx:   Encounter Diagnosis     ICD-10-CM    1. 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 Recurrent anterior dislocation of left shoulder  M24.412           Start Time: 1345  Stop Time: 1430  Total time in clinic (min): 45 minutes    Subjective: Patient reports that he is doing well with exercises      Objective: See treatment diary below      Assessment: Juanpablo has been compliant with attending physical therapy and completing home exercise program since initial evaluation.  He has made improvements in objective data since initial evaluation and has achieved all goals.  Patient reports having returned to their prior level of function. Patient provided with updated Home Exercise Program, all questions answered, and verbalized understanding, agreeing to plan of care. Thus it was mutually decided to discontinue this episode of care and transition to Home Exercise Program.         Plan: Continue per plan of care.         POC Expires Auth Status Start Date Expiration Date PT Visit Limit           Date   2025    Used        Remaining           Diagnosis: Left Laterjet (op 25)   Precautions: per protocol, NO ER in phase 1   Primary Goals: pain movement   *asterisks by exercise = given for HEP   Manuals 4/11 3/24   3/28   3/31 4/4   <Wk6 , ER 45     DO FOTO done   Wk 8   Full range of motion PROM with mobs post grades 2-3  Full range of motion PROM with mobs post grades 2-3   PNF - isometrics        Mobs    PROM and grade 1-2 shoulder mob  JW        Manual tb resisted internal roaiton at 90 2x15    There Ex        Wrist AROM*        C/s stretch*        Supine ER/ABD CAne        Prone YTI  X10 all direction \"5\"      Bent over upper cut and abd x10  x20     ER/IR isotonics   Blue to failure ea 4# [place] 2x10 ea 4# x 20  6# x 12   Scap stab  Tb " "rear delt [place]     40# row reps to failure    20# SAPD 50# 2x15 Prone 30# DB 3x12    Trx Rows 2x10    Usman 45#    SL FLXION        SL ABD        SL ER   5#x50     Scaption Tb   Ant delt 2x20  Mid delt 2x20    [place] Tb     PEC FLY        Ceiling punch        No money   x10     Tb punches        ABD/FX stretch  With weight (OP)*  x10    6->3# DB drop set     Neuro Re-Ed        Supine flexion Tb AAROM with pull aparts   3x20 pruple     Wall slides X10 flx/abd ea  Pendulums inbtween 3X10 FF lowering  3x10 ABD lowering  weighted ecc 5#   X20 with revolutions    Table slides        Bent over db row 10 lbs x10 15lb x10 25# 3x10     Functional Rot Stetching   Tb assist (P!)  Prayer holds x20 4\" holds Strap ER/IR ea 5x10\"   Chest press   15# 3x10 2x10    KB Pull overs QPED     15# 3x10   Trx Crossbody  S     10x10\"                           Patient education 11 weeks post op and associated precautions      9 weeks 6 days in protocol      Re-evaluation  JW          Ther Act            Pulleys  2'/2' 3'/3' 3'/3'    Pulley's 3'3'    UBE     Level `0 2'  Level 4 3\"       Modalities                                                             "

## 2025-04-22 ENCOUNTER — OFFICE VISIT (OUTPATIENT)
Dept: OBGYN CLINIC | Facility: CLINIC | Age: 40
End: 2025-04-22
Payer: COMMERCIAL

## 2025-04-22 VITALS — BODY MASS INDEX: 33.88 KG/M2 | WEIGHT: 242 LBS | HEIGHT: 71 IN

## 2025-04-22 DIAGNOSIS — M24.412 RECURRENT ANTERIOR DISLOCATION OF LEFT SHOULDER: Primary | ICD-10-CM

## 2025-04-22 PROCEDURE — 99213 OFFICE O/P EST LOW 20 MIN: CPT | Performed by: STUDENT IN AN ORGANIZED HEALTH CARE EDUCATION/TRAINING PROGRAM

## 2025-04-22 NOTE — PROGRESS NOTES
[facility] Sports Medicine Shoulder Follow Up Visit     Assesment:   40 y.o. male left shoulder 3 months and 20 days status post Latarjet / coracoid transfer of the left shoulder, continuing to do well post-operatively    Plan:  Rudolph continues to progress very well. He started strengthening as tolerated 1 month ago and has been advancing his strengthening with no issues identified. He has been lifting up to 30-40 lbs and has had no recurrent instability or apprehension. He is pleased with his outcome and his range of motion. He also denies any pain or biceps related issues.  We discussed that he may return to work at this time. I discussed the importance of continuing to advance his strengthening as tolerated and not doing any lifting of heavy weights without slowly working up to that weight. I reviewed the importance of avoiding at risk shoulder positions where his arm was behind him when possible and maintaining his arm in front of him when doing any sort of lifting. He did inquire about a tattoo around his incision, and I did recommend waiting at least 12 months from his date of surgery. All of his questions were answered in the office today. He will follow up in 2 months with repeat left shoulder x-rays. He was provided with a note for work.,    Follow up:    Return in about 2 months (around 6/22/2025). Left shoulder x-rays (AP, Grashey, Scapular Y, axillary)      Chief Complaint   Patient presents with    Left Shoulder - Follow-up       History of Present Illness:    Rudolph returns for follow-up of his left shoulder.  He is 3 months and 20 days status post left shoulder arthroscopy and latarjet coracoid transfer procedure.  He continues to do very well. He denies any pain or recurrent instability, and he feels his range of motion is mostly back to normal. 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 He was  "recently transitioned from formal PT to a home program with his physical therapist. He has no apprehension or sensation of instability, and he is very pleased with his outcome. He is thinking of getting a tattoo around his surgical incision. He has been working on strengthening and has been progressing his lifting as tolerated. He is up to lifting 30-40 lbs with his left arm. He has not yet gone back to work. 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 He denies numbness / tingling. No new injuries.  He continues to be totally off any tobacco or nicotine products.      Review of systems: ROS is negative other than that noted in the HPI.  Constitutional: Negative for fatigue and fever.      Physical Exam:    Height 5' 11\" (1.803 m), weight 110 kg (242 lb).    General/Constitutional: NAD, well developed, well nourished  HENT: Normocephalic, atraumatic  CV: Intact distal pulses, regular rate  Resp: No respiratory distress or labored breathing  GI: Soft and non-tender   Lymphatic: No lymphadenopathy palpated  Neuro: Alert and Oriented x 3, no focal deficits  Psych: Normal mood, normal affect, normal judgement, normal behavior  Skin: Warm, dry, no rashes, no erythema    Shoulder focused exam:       RIGHT LEFT    Scapula Atrophy Negative Negative     Winging Negative Negative     Protraction Negative Negative    Rotator cuff SS 5/5 5/5     IS 5/5 5/5     SubS 5/5 5/5    AROM     165     ER0 60 50     IRb T12    L3                  TTP: AC Negative Negative     Biceps Negative Negative     SC Joint Negative Negative     Scapular Spine Negative Negative     Proximal Humerus Negative Negative    Special Tests: O'Briens Negative Negative     Cross body Adduction Negative Negative     Speeds  Negative Negative     Vibha's Negative Negative     Neer Negative Negative     Wilder Negative Negative     Bear Hug Negative Negative    Instability: Apprehension & " relocation not tested negative     Load & shift not tested not tested    Other: Crank Negative Negative               UE NV Exam:    +2 Radial pulses bilaterally  Sensation intact to light touch C5-T1 bilaterally  5/5 deltoid, biceps, triceps, EPL, FPL, IO muscles                    Shoulder Imaging    No new imaging today

## 2025-05-10 DIAGNOSIS — K21.9 GASTROESOPHAGEAL REFLUX DISEASE, UNSPECIFIED WHETHER ESOPHAGITIS PRESENT: ICD-10-CM

## 2025-05-10 RX ORDER — OMEPRAZOLE 40 MG/1
40 CAPSULE, DELAYED RELEASE ORAL
Qty: 30 CAPSULE | Refills: 5 | Status: SHIPPED | OUTPATIENT
Start: 2025-05-10

## 2025-05-28 ENCOUNTER — OFFICE VISIT (OUTPATIENT)
Dept: FAMILY MEDICINE CLINIC | Facility: CLINIC | Age: 40
End: 2025-05-28
Payer: COMMERCIAL

## 2025-05-28 VITALS
BODY MASS INDEX: 35.14 KG/M2 | OXYGEN SATURATION: 96 % | HEART RATE: 90 BPM | DIASTOLIC BLOOD PRESSURE: 86 MMHG | HEIGHT: 71 IN | WEIGHT: 251 LBS | SYSTOLIC BLOOD PRESSURE: 120 MMHG | RESPIRATION RATE: 16 BRPM

## 2025-05-28 DIAGNOSIS — I10 PRIMARY HYPERTENSION: Primary | ICD-10-CM

## 2025-05-28 DIAGNOSIS — K21.9 GASTROESOPHAGEAL REFLUX DISEASE, UNSPECIFIED WHETHER ESOPHAGITIS PRESENT: ICD-10-CM

## 2025-05-28 DIAGNOSIS — F41.8 MIXED ANXIETY AND DEPRESSIVE DISORDER: ICD-10-CM

## 2025-05-28 PROCEDURE — 99214 OFFICE O/P EST MOD 30 MIN: CPT | Performed by: FAMILY MEDICINE

## 2025-05-28 NOTE — ASSESSMENT & PLAN NOTE
Blood pressure ok. Continue HCTZ 12.5 mg daily and amlodipine 5 mg daily. Pt advised to continue low Na diet and to exercise on a regular basis.

## 2025-05-28 NOTE — PROGRESS NOTES
"Name: Juanpablo Amaya Jr.      : 1985      MRN: [id number]  Encounter Provider: Chris Martinez MD  Encounter Date: 2025   Encounter department: [facility] MEDICINE  :  Assessment & Plan  Primary hypertension  Blood pressure ok. Continue HCTZ 12.5 mg daily and amlodipine 5 mg daily. Pt advised to continue low Na diet and to exercise on a regular basis.        Gastroesophageal reflux disease, unspecified whether esophagitis present  Stable. Continue omeprazole 40 mg every other day and GERD diet.        Mixed anxiety and depressive disorder  Stress level and mood ok. Continue escitalopram 20 mg daily. Patient sees therapist weekly.              Depression Screening and Follow-up Plan: Patient was screened for depression during today's encounter. They screened negative with a PHQ-9 score of 0.        History of Present Illness   Patient here for follow-up Hypertension, GERD, Anxiety, Depression. Patient doing ok. No chest pain or shortness of breath. No headaches. No GERD. Blood pressure up and down. No regular exercise. Follows low Na diet. Mood ok. Sees therapist every week.       Review of Systems   Constitutional:  Negative for fatigue and unexpected weight change.   Respiratory:  Negative for cough and shortness of breath.    Cardiovascular:  Negative for chest pain.   Gastrointestinal:  Negative for abdominal pain, constipation, diarrhea and vomiting.   Musculoskeletal:  Negative for arthralgias.   Neurological:  Negative for dizziness and headaches.   Psychiatric/Behavioral:  Negative for dysphoric mood. The patient is not nervous/anxious.        Objective   /86 (BP Location: Left arm, Patient Position: Sitting, Cuff Size: Large)   Pulse 90   Resp 16   Ht 5' 11\" (1.803 m)   Wt 114 kg (251 lb)   SpO2 96%   BMI 35.01 kg/m²      Physical Exam  Vitals and nursing note reviewed.   Constitutional:       Appearance: Normal appearance. He is obese.   Neck:      Vascular: No " carotid bruit.     Cardiovascular:      Rate and Rhythm: Normal rate and regular rhythm.      Heart sounds: Normal heart sounds. No murmur heard.  Pulmonary:      Effort: Pulmonary effort is normal.      Breath sounds: Normal breath sounds. No wheezing.     Musculoskeletal:      Cervical back: Normal range of motion and neck supple. No muscular tenderness.      Right lower leg: No edema.      Left lower leg: No edema.   Lymphadenopathy:      Cervical: No cervical adenopathy.     Neurological:      Mental Status: He is alert.     Psychiatric:         Mood and Affect: Mood normal.         Behavior: Behavior normal.         Thought Content: Thought content normal.         Judgment: Judgment normal.

## 2025-05-29 DIAGNOSIS — I10 PRIMARY HYPERTENSION: ICD-10-CM

## 2025-05-29 RX ORDER — ESCITALOPRAM OXALATE 20 MG/1
20 TABLET ORAL DAILY
Qty: 90 TABLET | Refills: 1 | Status: SHIPPED | OUTPATIENT
Start: 2025-05-29

## 2025-05-29 RX ORDER — AMLODIPINE BESYLATE 5 MG/1
5 TABLET ORAL DAILY
Qty: 90 TABLET | Refills: 1 | Status: SHIPPED | OUTPATIENT
Start: 2025-05-29

## 2025-06-16 ENCOUNTER — HOSPITAL ENCOUNTER (OUTPATIENT)
Dept: RADIOLOGY | Facility: HOSPITAL | Age: 40
Discharge: HOME | End: 2025-06-16
Attending: STUDENT IN AN ORGANIZED HEALTH CARE EDUCATION/TRAINING PROGRAM
Payer: COMMERCIAL

## 2025-06-16 VITALS — HEIGHT: 71 IN | WEIGHT: 251 LBS | BODY MASS INDEX: 35.14 KG/M2

## 2025-06-16 DIAGNOSIS — Z98.890 STATUS POST SURGERY: ICD-10-CM

## 2025-06-16 DIAGNOSIS — Z98.890 STATUS POST SURGERY: Primary | ICD-10-CM

## 2025-06-16 PROCEDURE — 73030 X-RAY EXAM OF SHOULDER: CPT

## 2025-06-16 PROCEDURE — 99213 OFFICE O/P EST LOW 20 MIN: CPT | Performed by: STUDENT IN AN ORGANIZED HEALTH CARE EDUCATION/TRAINING PROGRAM

## 2025-06-16 NOTE — PROGRESS NOTES
Ortho Sports Medicine Shoulder Follow Up Visit     Assesment:   40 y.o. male 5 months and 14 days status post Latarjet / coracoid transfer of the left shoulder, continuing to do well post-operatively.     Plan:    uRdolph is overall doing excellently clinically. He has returned to work on full duty with no instability, and he states he is able to complete all of his overhead activities and duties that previously used to cause him to have subjective instability. Moreover, prior to surgery he was having dislocation events in his sleep before, and now he has been able to sleep well without any recurrent instability events or sensation of instability, and he is quite pleased with his outcome. His range of motion is overall good and he is content with his current range of motion, and his strengthening has progressed well. I did review with him that while his scapular Y / Grashey radiographs today did not appear to show any significant change in the hardware or graft position, the axillary view showed a different graft and screw position, however this was likely due to rotation of the x-ray given the different screw angles and other differences in the x-ray positioning.  I did review his x-rays obtained today as well as all of his prior x-rays with 2 of my senior colleagues, Dr. Nguyen and Dr. Theodore, who agree that there is no obvious evidence of graft migration or hardware loosening / failure. I did discuss possible options with him including close monitoring with serial x-rays vs obtaining a CT scan. I did discuss that, as we discussed pre-operatively, there was the possibility of non-union or incomplete or fibrous union of the coracoid graft. After lengthy discussion, given that clinically he is doing really well with no complaints and no instability, and given that the screws appear to be intact with no obvious evidence of breakage / bending and the AP / Scap Y x-rays appear unchanged, we mutually agreed to proceed  with close monitoring and hold off on a CT scan given that it would be unlikely to  at this time. I did emphasize to Rudolph that if he has any new injury or worsening or develops mechanical symptoms or recurrent instability or has any concerns, he should reach out to the office immediately to discuss next steps and likely come in for another appointment to minimize risks if there were to be catastrophic hardware failure including screw breakage or any contact between the screws and his articular cartilage of the humeral head.  I did discuss that I would recommend a CT scan if he did develop any symptoms or new issues to suggest an issue with graft healing or migration.  I also did review this case and all of his radiographs and imaging with three of my senior partners / colleagues, Dr. Nguyen / Dr. Theodore / Dr. Jean Baptiste who agreed that, given that clinically he was doing well and in the absence of any findings to suggest mechanical symptoms from prominent hardware, it was reasonable to continue to observe and not pursue further advanced imaging / CT at this time. I discussed with Rudolph that while he could continue to work, he should avoid positions of risk including reaching far behind him or the max ABER position with heavy loads to minimize his risk of recurrent instability. I did inform him that I would give him a call in 1 month to check on him.    Follow up:    Follow-up in 2 months      Chief Complaint   Patient presents with    Left Shoulder - Follow-up       History of Present Illness:    Rudolph returns for follow up of his left shoulder.  Since the prior visit, He reports significant improvement.  He is now 5 months and 14 days status post left shoulder arthroscopy and coracoid transfer, Latarjet procedure on 1/2/2025.  He reports that he is overall doing very well.  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  His new job involves a lot of overhead activity and reaching, and he  reports that he is doing very well at work and has no complaints at all.  No recurrent sensation of instability or issues with the shoulder.  No mechanical symptoms or pain with range of motion. No numbness or tingling or new injuries.  No weakness.  He is overall very pleased with his outcome.  He continues to abstain from any nicotine products.      Past Medical, Social and Family History:  Past Medical History[1]  Past Surgical History[2]  Allergies[3]  Medications Ordered Prior to Encounter[4]  Social History     Socioeconomic History    Marital status: Single     Spouse name: Not on file    Number of children: Not on file    Years of education: Not on file    Highest education level: Not on file   Occupational History    Not on file   Tobacco Use    Smoking status: Former     Current packs/day: 0.00     Average packs/day: 0.5 packs/day for 29.9 years (15.0 ttl pk-yrs)     Types: Cigarettes     Start date:      Quit date: 2024     Years since quittin.5    Smokeless tobacco: Never    Tobacco comments:     On chantix   Vaping Use    Vaping status: Former    Substances: Nicotine, Flavoring   Substance and Sexual Activity    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    Review of systems: ROS is negative other than that noted in the HPI.  Constitutional: Negative for fatigue and fever.      Physical  "Exam:    Height 5' 11\" (1.803 m), weight 114 kg (251 lb).    General/Constitutional: NAD, well developed, well nourished       Shoulder focused exam:       RIGHT LEFT    Scapula Atrophy Negative Negative     Winging Negative Negative     Protraction Negative Negative    Rotator cuff SS 5/5 5/5     IS 5/5 5/5     SubS 5/5 5/5    AROM     165     ER0 60 55     IRb T12 L-4    PROM     165     ER0 60    50    TTP: AC Negative Negative     Biceps Negative Negative     SC Joint Negative Negative     Scapular Spine Negative Negative     Proximal Humerus Negative Negative    Special Tests: Speeds  Negative Negative     Vibha's Negative Negative     Bear Hug Negative Negative    Instability: Apprehension & relocation not tested negative     Load & shift not tested not tested        UE NV Exam: +2 Radial pulses bilaterally  Sensation intact to light touch C5-T1 bilaterally, Radial/median/ulnar nerve motor intact    Cervical ROM is full without pain, numbness or tingling                      Shoulder Imaging    I personally reviewed and interpreted radiographs of the left shoulder obtained in the office today including AP, scapular Y, Grashey, and axillary which demonstrate s/p Latarjet coracoid transfer. The position of the graft and screws appears unchanged from prior x-rays on 3/25/25 on the AP and Scapular Y views. No evidence of graft breakage or fracture. There is no obvious evidence of screw bending. On the axillary view, the graft does appear to have possibly rotated, however the axillary view is different from prior x-rays.  Consistent with prior x-rays, the inferior aspect of the graft is slightly more lateral than the superior aspect of the graft.         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; also age 3 unknown cause    Small bowel obstruction (HCC) " 02/17/2022   [2]   Past Surgical History:  Procedure Laterality Date    MULTIPLE TOOTH EXTRACTIONS      AR CAPSULORRHAPHY ANTERIOR W/CORACOID PROCESS TR Left 1/2/2025    Procedure: LATARJET SHOULDER RECONSTRUCTION;  Surgeon: Reji Perez MD;  Location:  MAIN OR;  Service: Orthopedics    SHOULDER ARTHROSCOPY Left 1/2/2025    Procedure: ARTHROSCOPY SHOULDER;  Surgeon: Reji Perez MD;  Location:  MAIN OR;  Service: Orthopedics    SHOULDER SURGERY Left    [3] No Known Allergies  [4]   Current Outpatient Medications on File Prior to Visit   Medication Sig Dispense Refill    amLODIPine (NORVASC) 5 mg tablet TAKE 1 TABLET (5 MG TOTAL) BY MOUTH DAILY. 90 tablet 1    escitalopram (LEXAPRO) 20 mg tablet TAKE 1 TABLET BY MOUTH EVERY DAY 90 tablet 1    hydroCHLOROthiazide 12.5 mg tablet Take 1 tablet (12.5 mg total) by mouth daily 30 tablet 5    omeprazole (PriLOSEC) 40 MG capsule TAKE 1 CAPSULE BY MOUTH EVERY DAY BEFORE BREAKFAST 30 capsule 5     No current facility-administered medications on file prior to visit.

## 2025-07-07 DIAGNOSIS — I10 PRIMARY HYPERTENSION: ICD-10-CM

## 2025-07-08 RX ORDER — HYDROCHLOROTHIAZIDE 12.5 MG/1
12.5 TABLET ORAL DAILY
Qty: 90 TABLET | Refills: 1 | Status: SHIPPED | OUTPATIENT
Start: 2025-07-08

## (undated) DEVICE — GLOVE INDICATOR PI UNDERGLOVE SZ 7.5 BLUE

## (undated) DEVICE — DRESSING MEPILEX AG BORDER 4 X 4 IN

## (undated) DEVICE — SKIN MARKER DUAL TIP WITH RULER CAP, FLEXIBLE RULER AND LABELS: Brand: DEVON

## (undated) DEVICE — 3M™ STERI-DRAPE™ U-DRAPE 1015: Brand: STERI-DRAPE™

## (undated) DEVICE — ELECTRODE NEEDLE MEGAFINE 2IN E-Z CLEAN MEGADYNE -0118

## (undated) DEVICE — 10K ARTHROSCOPY INFLOW/OUTFLOW TUBE SET: Brand: 10K

## (undated) DEVICE — THIN OFFSET (9.0 X 0.38 X 25.0MM)

## (undated) DEVICE — SUT STRATAFIX SPIRAL MONOCRYL PLUS 3-0 PS-2 45CM SXMP1B107

## (undated) DEVICE — CURITY NON-ADHERENT STRIPS: Brand: CURITY

## (undated) DEVICE — EXOFIN PRECISION PEN HIGH VISCOSITY TOPICAL SKIN ADHESIVE: Brand: EXOFIN PRECISION PEN, 1G

## (undated) DEVICE — MAYO STAND COVER: Brand: CONVERTORS

## (undated) DEVICE — 3.5MM CORTEX SCREW SELF-TAPPING 40MM: Type: IMPLANTABLE DEVICE | Site: SHOULDER | Status: NON-FUNCTIONAL

## (undated) DEVICE — SYRINGE 10ML LL

## (undated) DEVICE — SUT FIBERWIRE #2 1/2 CIRCLE T-5 38IN AR-7200

## (undated) DEVICE — IMPERVIOUS STOCKINETTE: Brand: DEROYAL

## (undated) DEVICE — 3.5MM DRILL BIT/QC/110MM

## (undated) DEVICE — INTENDED FOR TISSUE SEPARATION, AND OTHER PROCEDURES THAT REQUIRE A SHARP SURGICAL BLADE TO PUNCTURE OR CUT.: Brand: BARD-PARKER ® CARBON RIB-BACK BLADES

## (undated) DEVICE — STRETCH BANDAGE: Brand: CURITY

## (undated) DEVICE — DRESSING MEPILEX AG BORDER POST-OP 4 X 6 IN

## (undated) DEVICE — INTENDED FOR TISSUE SEPARATION, AND OTHER PROCEDURES THAT REQUIRE A SHARP SURGICAL BLADE TO PUNCTURE OR CUT.: Brand: BARD-PARKER SAFETY BLADES SIZE 10, STERILE

## (undated) DEVICE — 3.2MM DRILL BIT/QC/195MM

## (undated) DEVICE — SUT MONOCRYL 2-0 SH 27 IN Y417H

## (undated) DEVICE — STEINMANN PIN DIAMOND POINT ROUND                                    END 7/64 X 9
Type: IMPLANTABLE DEVICE | Site: SHOULDER | Status: NON-FUNCTIONAL
Removed: 2025-01-02

## (undated) DEVICE — T-MAX DISPOSABLE FACE MASK 8 PER BOX

## (undated) DEVICE — SUT ETHILON 3-0 PS-1 18 IN 1663H

## (undated) DEVICE — GROUNDING PAD UNIVERSAL SLW

## (undated) DEVICE — PACK PBDS SHOULDER ARTHROSCOPY RF

## (undated) DEVICE — NEPTUNE E-SEP SMOKE EVACUATION PENCIL, COATED, 70MM BLADE, PUSH BUTTON SWITCH: Brand: NEPTUNE E-SEP

## (undated) DEVICE — BULB SYRINGE,IRRIGATION WITH PROTECTIVE CAP: Brand: DOVER

## (undated) DEVICE — 2.5MM DRILL BIT/QC/GOLD/110MM

## (undated) DEVICE — NEEDLE 25G X 1 1/2

## (undated) DEVICE — DISPOSABLE EQUIPMENT COVER: Brand: SMALL TOWEL DRAPE

## (undated) DEVICE — MICRO SAGITTAL BLADE 90 DEGREE ANGLE OFFSET  (14.3 X 0.38 X 15.9MM)

## (undated) DEVICE — SUT MONOCRYL 3-0 PS-2 27 IN Y427H

## (undated) DEVICE — GLOVE SRG BIOGEL 7.5

## (undated) DEVICE — BONE WAX WHITE: Brand: BONE WAX WHITE

## (undated) DEVICE — CANNULA 7 X70MM THRD SEAL SIDE PORT

## (undated) DEVICE — MEDI-VAC YANKAUER SUCTION HANDLE W/STRAIGHT TIP & CONTROL VENT: Brand: CARDINAL HEALTH

## (undated) DEVICE — OCCLUSIVE GAUZE STRIP,3% BISMUTH TRIBROMOPHENATE IN PETROLATUM BLEND: Brand: XEROFORM

## (undated) DEVICE — Device

## (undated) DEVICE — GLOVE SRG BIOGEL 7

## (undated) DEVICE — 3.5MM CORTEX SCREW SELF-TAPPING 36MM: Type: IMPLANTABLE DEVICE | Site: SHOULDER | Status: NON-FUNCTIONAL

## (undated) DEVICE — HEAVY DUTY TABLE COVER: Brand: CONVERTORS

## (undated) DEVICE — DRAPE SHEET THREE QUARTER

## (undated) DEVICE — GLOVE INDICATOR PI UNDERGLOVE SZ 8 BLUE

## (undated) DEVICE — SPONGE 4 X 4 XRAY 16 PLY STRL LF RFD

## (undated) DEVICE — COBAN 4 IN STERILE

## (undated) DEVICE — MAT ABSORBANT ARTHROSCOPY FLOOR 46 X 40 IN

## (undated) DEVICE — CHLORAPREP HI-LITE 26ML ORANGE

## (undated) DEVICE — SUT VICRYL 0 CT-1 27 IN J260H

## (undated) DEVICE — SUT VICRYL 2-0 CT-1 27 IN J259H

## (undated) DEVICE — TUBING SUCTION 5MM X 12 FT

## (undated) DEVICE — POSITIONER TRIMANO LIMB BEACH CHAIR

## (undated) DEVICE — SUT ETHIBOND 2 OS-4 R/C 30 IN X519H